# Patient Record
Sex: FEMALE | Race: WHITE | NOT HISPANIC OR LATINO
[De-identification: names, ages, dates, MRNs, and addresses within clinical notes are randomized per-mention and may not be internally consistent; named-entity substitution may affect disease eponyms.]

---

## 2017-01-26 ENCOUNTER — RX RENEWAL (OUTPATIENT)
Age: 82
End: 2017-01-26

## 2017-02-27 ENCOUNTER — RX RENEWAL (OUTPATIENT)
Age: 82
End: 2017-02-27

## 2017-02-28 ENCOUNTER — RX RENEWAL (OUTPATIENT)
Age: 82
End: 2017-02-28

## 2017-03-13 ENCOUNTER — RX RENEWAL (OUTPATIENT)
Age: 82
End: 2017-03-13

## 2017-03-20 ENCOUNTER — APPOINTMENT (OUTPATIENT)
Dept: GERIATRICS | Facility: CLINIC | Age: 82
End: 2017-03-20

## 2017-03-20 VITALS
TEMPERATURE: 97.6 F | OXYGEN SATURATION: 96 % | WEIGHT: 154.25 LBS | HEIGHT: 63 IN | RESPIRATION RATE: 16 BRPM | SYSTOLIC BLOOD PRESSURE: 110 MMHG | HEART RATE: 91 BPM | BODY MASS INDEX: 27.33 KG/M2 | DIASTOLIC BLOOD PRESSURE: 60 MMHG

## 2017-03-20 VITALS
WEIGHT: 154 LBS | TEMPERATURE: 97.6 F | SYSTOLIC BLOOD PRESSURE: 110 MMHG | BODY MASS INDEX: 27.29 KG/M2 | DIASTOLIC BLOOD PRESSURE: 60 MMHG | HEIGHT: 63 IN

## 2017-03-21 ENCOUNTER — RX RENEWAL (OUTPATIENT)
Age: 82
End: 2017-03-21

## 2017-03-21 LAB
ALBUMIN SERPL ELPH-MCNC: 4 G/DL
ALP BLD-CCNC: 68 U/L
ALT SERPL-CCNC: 6 U/L
ANION GAP SERPL CALC-SCNC: 19 MMOL/L
AST SERPL-CCNC: 17 U/L
BASOPHILS # BLD AUTO: 0.04 K/UL
BASOPHILS NFR BLD AUTO: 0.5 %
BILIRUB SERPL-MCNC: 0.5 MG/DL
BUN SERPL-MCNC: 18 MG/DL
CALCIUM SERPL-MCNC: 9.5 MG/DL
CHLORIDE SERPL-SCNC: 100 MMOL/L
CHOLEST SERPL-MCNC: 139 MG/DL
CHOLEST/HDLC SERPL: 3.1 RATIO
CO2 SERPL-SCNC: 24 MMOL/L
CREAT SERPL-MCNC: 0.87 MG/DL
EOSINOPHIL # BLD AUTO: 0.11 K/UL
EOSINOPHIL NFR BLD AUTO: 1.5 %
GLUCOSE SERPL-MCNC: 92 MG/DL
HBA1C MFR BLD HPLC: 5.6 %
HCT VFR BLD CALC: 42.4 %
HDLC SERPL-MCNC: 45 MG/DL
HGB BLD-MCNC: 14.1 G/DL
IMM GRANULOCYTES NFR BLD AUTO: 0.1 %
LDLC SERPL CALC-MCNC: 76 MG/DL
LYMPHOCYTES # BLD AUTO: 2.23 K/UL
LYMPHOCYTES NFR BLD AUTO: 30.4 %
MAN DIFF?: NORMAL
MCHC RBC-ENTMCNC: 30.6 PG
MCHC RBC-ENTMCNC: 33.3 GM/DL
MCV RBC AUTO: 92 FL
MONOCYTES # BLD AUTO: 0.51 K/UL
MONOCYTES NFR BLD AUTO: 7 %
NEUTROPHILS # BLD AUTO: 4.43 K/UL
NEUTROPHILS NFR BLD AUTO: 60.5 %
PLATELET # BLD AUTO: 268 K/UL
POTASSIUM SERPL-SCNC: 4 MMOL/L
PROT SERPL-MCNC: 6.9 G/DL
RBC # BLD: 4.61 M/UL
RBC # FLD: 14.5 %
SODIUM SERPL-SCNC: 143 MMOL/L
TRIGL SERPL-MCNC: 89 MG/DL
TSH SERPL-ACNC: 1.41 UIU/ML
WBC # FLD AUTO: 7.33 K/UL

## 2017-04-18 ENCOUNTER — RX RENEWAL (OUTPATIENT)
Age: 82
End: 2017-04-18

## 2017-05-08 ENCOUNTER — RX RENEWAL (OUTPATIENT)
Age: 82
End: 2017-05-08

## 2017-05-17 ENCOUNTER — RX RENEWAL (OUTPATIENT)
Age: 82
End: 2017-05-17

## 2017-05-30 ENCOUNTER — RX RENEWAL (OUTPATIENT)
Age: 82
End: 2017-05-30

## 2017-06-08 ENCOUNTER — RX RENEWAL (OUTPATIENT)
Age: 82
End: 2017-06-08

## 2017-06-13 ENCOUNTER — APPOINTMENT (OUTPATIENT)
Dept: GERIATRICS | Facility: CLINIC | Age: 82
End: 2017-06-13

## 2017-06-13 VITALS
OXYGEN SATURATION: 95 % | RESPIRATION RATE: 15 BRPM | HEIGHT: 63 IN | BODY MASS INDEX: 26.95 KG/M2 | TEMPERATURE: 97.5 F | HEART RATE: 85 BPM | DIASTOLIC BLOOD PRESSURE: 60 MMHG | SYSTOLIC BLOOD PRESSURE: 110 MMHG | WEIGHT: 152.13 LBS

## 2017-06-13 DIAGNOSIS — J06.9 ACUTE UPPER RESPIRATORY INFECTION, UNSPECIFIED: ICD-10-CM

## 2017-06-13 DIAGNOSIS — B97.89 ACUTE UPPER RESPIRATORY INFECTION, UNSPECIFIED: ICD-10-CM

## 2017-07-10 ENCOUNTER — RX RENEWAL (OUTPATIENT)
Age: 82
End: 2017-07-10

## 2017-07-14 ENCOUNTER — MEDICATION RENEWAL (OUTPATIENT)
Age: 82
End: 2017-07-14

## 2017-07-31 ENCOUNTER — RX RENEWAL (OUTPATIENT)
Age: 82
End: 2017-07-31

## 2017-08-10 ENCOUNTER — RX RENEWAL (OUTPATIENT)
Age: 82
End: 2017-08-10

## 2017-08-10 DIAGNOSIS — M25.511 PAIN IN RIGHT SHOULDER: ICD-10-CM

## 2017-08-15 ENCOUNTER — RX RENEWAL (OUTPATIENT)
Age: 82
End: 2017-08-15

## 2017-09-14 ENCOUNTER — RX RENEWAL (OUTPATIENT)
Age: 82
End: 2017-09-14

## 2017-09-18 ENCOUNTER — RX RENEWAL (OUTPATIENT)
Age: 82
End: 2017-09-18

## 2017-10-03 ENCOUNTER — APPOINTMENT (OUTPATIENT)
Dept: GERIATRICS | Facility: CLINIC | Age: 82
End: 2017-10-03

## 2017-10-04 ENCOUNTER — OTHER (OUTPATIENT)
Age: 82
End: 2017-10-04

## 2017-10-16 ENCOUNTER — MEDICATION RENEWAL (OUTPATIENT)
Age: 82
End: 2017-10-16

## 2017-10-18 ENCOUNTER — RX RENEWAL (OUTPATIENT)
Age: 82
End: 2017-10-18

## 2017-10-19 ENCOUNTER — APPOINTMENT (OUTPATIENT)
Dept: GERIATRICS | Facility: CLINIC | Age: 82
End: 2017-10-19

## 2017-11-13 ENCOUNTER — RX RENEWAL (OUTPATIENT)
Age: 82
End: 2017-11-13

## 2017-11-15 ENCOUNTER — RX RENEWAL (OUTPATIENT)
Age: 82
End: 2017-11-15

## 2017-12-03 ENCOUNTER — RX RENEWAL (OUTPATIENT)
Age: 82
End: 2017-12-03

## 2017-12-08 ENCOUNTER — APPOINTMENT (OUTPATIENT)
Dept: OTOLARYNGOLOGY | Facility: CLINIC | Age: 82
End: 2017-12-08
Payer: MEDICARE

## 2017-12-08 VITALS
HEART RATE: 69 BPM | DIASTOLIC BLOOD PRESSURE: 99 MMHG | SYSTOLIC BLOOD PRESSURE: 153 MMHG | HEIGHT: 63 IN | WEIGHT: 152 LBS | BODY MASS INDEX: 26.93 KG/M2

## 2017-12-08 DIAGNOSIS — J30.9 ALLERGIC RHINITIS, UNSPECIFIED: ICD-10-CM

## 2017-12-08 PROCEDURE — 99214 OFFICE O/P EST MOD 30 MIN: CPT | Mod: 25

## 2017-12-08 PROCEDURE — 69210 REMOVE IMPACTED EAR WAX UNI: CPT

## 2017-12-08 RX ORDER — QUETIAPINE 150 MG/1
150 TABLET, FILM COATED, EXTENDED RELEASE ORAL
Qty: 30 | Refills: 0 | Status: ACTIVE | COMMUNITY
Start: 2017-06-01

## 2017-12-14 ENCOUNTER — APPOINTMENT (OUTPATIENT)
Dept: GERIATRICS | Facility: CLINIC | Age: 82
End: 2017-12-14
Payer: MEDICARE

## 2017-12-14 ENCOUNTER — RX RENEWAL (OUTPATIENT)
Age: 82
End: 2017-12-14

## 2017-12-14 VITALS
HEIGHT: 63 IN | HEART RATE: 96 BPM | DIASTOLIC BLOOD PRESSURE: 90 MMHG | SYSTOLIC BLOOD PRESSURE: 170 MMHG | BODY MASS INDEX: 27.11 KG/M2 | TEMPERATURE: 98.3 F | WEIGHT: 153 LBS | OXYGEN SATURATION: 96 % | RESPIRATION RATE: 15 BRPM

## 2017-12-14 DIAGNOSIS — Z00.00 ENCOUNTER FOR GENERAL ADULT MEDICAL EXAMINATION W/OUT ABNORMAL FINDINGS: ICD-10-CM

## 2017-12-14 PROCEDURE — 99214 OFFICE O/P EST MOD 30 MIN: CPT | Mod: GC

## 2017-12-14 RX ORDER — CIPROFLOXACIN HYDROCHLORIDE 500 MG/1
500 TABLET, FILM COATED ORAL
Qty: 28 | Refills: 0 | Status: DISCONTINUED | COMMUNITY
Start: 2016-09-23 | End: 2017-12-14

## 2017-12-14 RX ORDER — METRONIDAZOLE 500 MG/1
500 TABLET ORAL EVERY 8 HOURS
Qty: 30 | Refills: 0 | Status: DISCONTINUED | COMMUNITY
Start: 2016-09-23 | End: 2017-12-14

## 2017-12-14 RX ORDER — CETIRIZINE HYDROCHLORIDE 10 MG/1
10 TABLET, FILM COATED ORAL
Qty: 1 | Refills: 0 | Status: DISCONTINUED | COMMUNITY
Start: 2017-06-13 | End: 2017-12-14

## 2017-12-14 RX ORDER — DULOXETINE HYDROCHLORIDE 60 MG/1
60 CAPSULE, DELAYED RELEASE PELLETS ORAL
Qty: 30 | Refills: 0 | Status: DISCONTINUED | COMMUNITY
Start: 2017-06-01 | End: 2017-12-14

## 2017-12-18 ENCOUNTER — APPOINTMENT (OUTPATIENT)
Dept: OTOLARYNGOLOGY | Facility: CLINIC | Age: 82
End: 2017-12-18

## 2018-01-09 ENCOUNTER — RX RENEWAL (OUTPATIENT)
Age: 83
End: 2018-01-09

## 2018-01-12 ENCOUNTER — MEDICATION RENEWAL (OUTPATIENT)
Age: 83
End: 2018-01-12

## 2018-01-30 ENCOUNTER — APPOINTMENT (OUTPATIENT)
Dept: UROGYNECOLOGY | Facility: CLINIC | Age: 83
End: 2018-01-30

## 2018-02-13 ENCOUNTER — MEDICATION RENEWAL (OUTPATIENT)
Age: 83
End: 2018-02-13

## 2018-03-14 ENCOUNTER — MEDICATION RENEWAL (OUTPATIENT)
Age: 83
End: 2018-03-14

## 2018-03-28 ENCOUNTER — RX RENEWAL (OUTPATIENT)
Age: 83
End: 2018-03-28

## 2018-03-29 ENCOUNTER — APPOINTMENT (OUTPATIENT)
Dept: GERIATRICS | Facility: CLINIC | Age: 83
End: 2018-03-29
Payer: MEDICARE

## 2018-03-29 VITALS
WEIGHT: 161.38 LBS | SYSTOLIC BLOOD PRESSURE: 110 MMHG | HEART RATE: 90 BPM | RESPIRATION RATE: 16 BRPM | DIASTOLIC BLOOD PRESSURE: 70 MMHG | OXYGEN SATURATION: 74 % | HEIGHT: 63 IN | BODY MASS INDEX: 28.59 KG/M2 | TEMPERATURE: 97.6 F

## 2018-03-29 DIAGNOSIS — S70.10XA CONTUSION OF UNSPECIFIED THIGH, INITIAL ENCOUNTER: ICD-10-CM

## 2018-03-29 LAB
ALBUMIN SERPL ELPH-MCNC: 3.9 G/DL
ALP BLD-CCNC: 69 U/L
ALT SERPL-CCNC: 9 U/L
ANION GAP SERPL CALC-SCNC: 14 MMOL/L
AST SERPL-CCNC: 16 U/L
BASOPHILS # BLD AUTO: 0.03 K/UL
BASOPHILS NFR BLD AUTO: 0.4 %
BILIRUB SERPL-MCNC: 0.3 MG/DL
BUN SERPL-MCNC: 14 MG/DL
CALCIUM SERPL-MCNC: 9.5 MG/DL
CHLORIDE SERPL-SCNC: 105 MMOL/L
CHOLEST SERPL-MCNC: 148 MG/DL
CHOLEST/HDLC SERPL: 2.7 RATIO
CO2 SERPL-SCNC: 25 MMOL/L
CREAT SERPL-MCNC: 0.95 MG/DL
EOSINOPHIL # BLD AUTO: 0.11 K/UL
EOSINOPHIL NFR BLD AUTO: 1.6 %
GLUCOSE SERPL-MCNC: 72 MG/DL
HCT VFR BLD CALC: 36.1 %
HDLC SERPL-MCNC: 54 MG/DL
HGB BLD-MCNC: 11.6 G/DL
IMM GRANULOCYTES NFR BLD AUTO: 0 %
LDLC SERPL CALC-MCNC: 74 MG/DL
LYMPHOCYTES # BLD AUTO: 2.16 K/UL
LYMPHOCYTES NFR BLD AUTO: 32 %
MAN DIFF?: NORMAL
MCHC RBC-ENTMCNC: 29.7 PG
MCHC RBC-ENTMCNC: 32.1 GM/DL
MCV RBC AUTO: 92.6 FL
MONOCYTES # BLD AUTO: 0.59 K/UL
MONOCYTES NFR BLD AUTO: 8.7 %
NEUTROPHILS # BLD AUTO: 3.87 K/UL
NEUTROPHILS NFR BLD AUTO: 57.3 %
PLATELET # BLD AUTO: 271 K/UL
POTASSIUM SERPL-SCNC: 4.6 MMOL/L
PROT SERPL-MCNC: 6.7 G/DL
RBC # BLD: 3.9 M/UL
RBC # FLD: 15.3 %
SODIUM SERPL-SCNC: 144 MMOL/L
TRIGL SERPL-MCNC: 101 MG/DL
WBC # FLD AUTO: 6.76 K/UL

## 2018-03-29 PROCEDURE — 99214 OFFICE O/P EST MOD 30 MIN: CPT

## 2018-04-06 ENCOUNTER — RX RENEWAL (OUTPATIENT)
Age: 83
End: 2018-04-06

## 2018-04-09 ENCOUNTER — OUTPATIENT (OUTPATIENT)
Dept: OUTPATIENT SERVICES | Facility: HOSPITAL | Age: 83
LOS: 1 days | End: 2018-04-09
Payer: MEDICARE

## 2018-04-09 ENCOUNTER — APPOINTMENT (OUTPATIENT)
Dept: RADIOLOGY | Facility: CLINIC | Age: 83
End: 2018-04-09
Payer: MEDICARE

## 2018-04-09 DIAGNOSIS — Z00.8 ENCOUNTER FOR OTHER GENERAL EXAMINATION: ICD-10-CM

## 2018-04-09 PROCEDURE — 73552 X-RAY EXAM OF FEMUR 2/>: CPT

## 2018-04-09 PROCEDURE — 73552 X-RAY EXAM OF FEMUR 2/>: CPT | Mod: 26,RT

## 2018-04-24 ENCOUNTER — RX RENEWAL (OUTPATIENT)
Age: 83
End: 2018-04-24

## 2018-05-31 ENCOUNTER — RX RENEWAL (OUTPATIENT)
Age: 83
End: 2018-05-31

## 2018-06-08 ENCOUNTER — RX RENEWAL (OUTPATIENT)
Age: 83
End: 2018-06-08

## 2018-07-12 ENCOUNTER — APPOINTMENT (OUTPATIENT)
Dept: GERIATRICS | Facility: CLINIC | Age: 83
End: 2018-07-12
Payer: MEDICARE

## 2018-07-12 VITALS
DIASTOLIC BLOOD PRESSURE: 70 MMHG | OXYGEN SATURATION: 99 % | TEMPERATURE: 97.3 F | SYSTOLIC BLOOD PRESSURE: 120 MMHG | RESPIRATION RATE: 16 BRPM | HEART RATE: 78 BPM | BODY MASS INDEX: 26.75 KG/M2 | HEIGHT: 63 IN | WEIGHT: 151 LBS

## 2018-07-12 DIAGNOSIS — Z23 ENCOUNTER FOR IMMUNIZATION: ICD-10-CM

## 2018-07-12 PROCEDURE — 99214 OFFICE O/P EST MOD 30 MIN: CPT

## 2018-07-12 RX ORDER — FLUOXETINE HYDROCHLORIDE 20 MG/1
20 CAPSULE ORAL
Qty: 30 | Refills: 0 | Status: DISCONTINUED | COMMUNITY
Start: 2018-05-11

## 2018-07-12 RX ORDER — FLUOXETINE HYDROCHLORIDE 10 MG/1
10 CAPSULE ORAL
Qty: 30 | Refills: 0 | Status: DISCONTINUED | COMMUNITY
Start: 2018-04-27

## 2018-07-12 RX ORDER — CLONAZEPAM 0.5 MG/1
0.5 TABLET ORAL
Qty: 60 | Refills: 0 | Status: DISCONTINUED | COMMUNITY
Start: 2018-02-23

## 2018-08-01 ENCOUNTER — RX RENEWAL (OUTPATIENT)
Age: 83
End: 2018-08-01

## 2018-08-13 ENCOUNTER — APPOINTMENT (OUTPATIENT)
Dept: OTOLARYNGOLOGY | Facility: CLINIC | Age: 83
End: 2018-08-13

## 2018-08-14 ENCOUNTER — APPOINTMENT (OUTPATIENT)
Dept: OTOLARYNGOLOGY | Facility: CLINIC | Age: 83
End: 2018-08-14
Payer: MEDICARE

## 2018-08-14 VITALS
HEART RATE: 88 BPM | SYSTOLIC BLOOD PRESSURE: 111 MMHG | DIASTOLIC BLOOD PRESSURE: 81 MMHG | WEIGHT: 151 LBS | BODY MASS INDEX: 26.75 KG/M2 | HEIGHT: 63 IN

## 2018-08-14 PROCEDURE — 99214 OFFICE O/P EST MOD 30 MIN: CPT | Mod: 25

## 2018-08-14 PROCEDURE — 69210 REMOVE IMPACTED EAR WAX UNI: CPT

## 2018-09-19 ENCOUNTER — RX RENEWAL (OUTPATIENT)
Age: 83
End: 2018-09-19

## 2018-09-27 ENCOUNTER — RX RENEWAL (OUTPATIENT)
Age: 83
End: 2018-09-27

## 2018-10-02 ENCOUNTER — RX RENEWAL (OUTPATIENT)
Age: 83
End: 2018-10-02

## 2018-10-11 ENCOUNTER — APPOINTMENT (OUTPATIENT)
Dept: GERIATRICS | Facility: CLINIC | Age: 83
End: 2018-10-11
Payer: MEDICARE

## 2018-10-11 VITALS
OXYGEN SATURATION: 98 % | DIASTOLIC BLOOD PRESSURE: 80 MMHG | TEMPERATURE: 97.5 F | SYSTOLIC BLOOD PRESSURE: 140 MMHG | HEART RATE: 94 BPM | BODY MASS INDEX: 26.08 KG/M2 | WEIGHT: 147.2 LBS

## 2018-10-11 PROCEDURE — 90662 IIV NO PRSV INCREASED AG IM: CPT

## 2018-10-11 PROCEDURE — G0008: CPT

## 2018-10-11 PROCEDURE — 99214 OFFICE O/P EST MOD 30 MIN: CPT | Mod: 25

## 2018-10-11 RX ORDER — QUETIAPINE FUMARATE 25 MG/1
25 TABLET ORAL DAILY
Refills: 0 | Status: ACTIVE | COMMUNITY
Start: 2018-10-11

## 2018-11-07 ENCOUNTER — APPOINTMENT (OUTPATIENT)
Dept: OTOLARYNGOLOGY | Facility: CLINIC | Age: 83
End: 2018-11-07

## 2018-11-07 ENCOUNTER — RX RENEWAL (OUTPATIENT)
Age: 83
End: 2018-11-07

## 2018-11-26 ENCOUNTER — RX RENEWAL (OUTPATIENT)
Age: 83
End: 2018-11-26

## 2018-12-13 ENCOUNTER — RX RENEWAL (OUTPATIENT)
Age: 83
End: 2018-12-13

## 2019-01-17 ENCOUNTER — APPOINTMENT (OUTPATIENT)
Dept: UROGYNECOLOGY | Facility: CLINIC | Age: 84
End: 2019-01-17
Payer: MEDICARE

## 2019-01-17 DIAGNOSIS — Z86.59 PERSONAL HISTORY OF OTHER MENTAL AND BEHAVIORAL DISORDERS: ICD-10-CM

## 2019-01-17 DIAGNOSIS — Z82.49 FAMILY HISTORY OF ISCHEMIC HEART DISEASE AND OTHER DISEASES OF THE CIRCULATORY SYSTEM: ICD-10-CM

## 2019-01-17 DIAGNOSIS — N30.00 ACUTE CYSTITIS W/OUT HEMATURIA: ICD-10-CM

## 2019-01-17 LAB
BILIRUB UR QL STRIP: NORMAL
CLARITY UR: NORMAL
COLLECTION METHOD: NORMAL
GLUCOSE UR-MCNC: NORMAL
HCG UR QL: 0.2 EU/DL
HGB UR QL STRIP.AUTO: NORMAL
KETONES UR-MCNC: NORMAL
LEUKOCYTE ESTERASE UR QL STRIP: NORMAL
NITRITE UR QL STRIP: NORMAL
PH UR STRIP: 5.5
PROT UR STRIP-MCNC: NORMAL
SP GR UR STRIP: 1.02

## 2019-01-17 PROCEDURE — 99204 OFFICE O/P NEW MOD 45 MIN: CPT | Mod: 25

## 2019-01-17 PROCEDURE — 51701 INSERT BLADDER CATHETER: CPT

## 2019-01-17 PROCEDURE — 81003 URINALYSIS AUTO W/O SCOPE: CPT | Mod: QW

## 2019-01-17 NOTE — PROCEDURE
[Straight Catheterization] : insertion of a straight catheter [Urinary Tract Infection] : a urinary tract infection [Urinary Frequency] : urinary frequency

## 2019-01-17 NOTE — ASSESSMENT
[FreeTextEntry1] : this is an 83-year-old woman with your severe urinary incontinence and severe atrophy of the [employer]. There is i I have recommended lotrisone, estrogen cream and zinc oxide. The catheter urine is  positive for cystitis I've given her Bactrim twice a day for three days she will using on her own at home and return for self administration teaching  and for her caretaker to also learn on how to apply the lectures on an etching cream . we will work up the incontinence after the above interventions

## 2019-01-17 NOTE — HISTORY OF PRESENT ILLNESS
[Cystocele (Obstetric)] : none [Uterine Prolapse] : none [Vaginal Wall Prolapse] : none [Rectal Prolapse] : none [Stress Incontinence] : none [Urge Incontinence Of Urine] : none [Unable To Restrain Bowel Movement] : frequent [x1] : once nightly [Urinary Stream Starts And Stops] : none [Incomplete Emptying Of Bladder] : none [Urinary Frequency] : none [Feelings Of Urinary Urgency] : none [Pain During Urination (Dysuria)] : none [Urinary Tract Infection] : frequent [Hematuria] : none [Constipation Obstructed Defecation] : none [Incomplete Emptying Of Stool] : none [Stool Visible Blood] : none [Diarrhea] : none [Pelvic Pain] : none [Vaginal Pain] : none [Vulvar Pain] : none [Bladder Pain] : none [Rectal Pain] : none [Back Pain] : frequent [Sexual Dysfunction, NOS] : none [] : none

## 2019-01-17 NOTE — PHYSICAL EXAM
[No Acute Distress] : in no acute distress [Well developed] : well developed [Well Nourished] : ~L well nourished [Good Hygeine] : demonstrates good hygeine [Oriented x3] : oriented to person, place, and time [Normal Memory] : ~T memory was ~L unimpaired [Normal Mood/Affect] : mood and affect are normal [Normal Appearance] : ~T the appearance of the neck was normal [Supple] : ~T the neck demonstrated no ~M decrease in suppleness [Symmetrical] : the neck was ~L symmetrical [Mass] : no breast mass [Tender] : no tenderness [Nipple Discharge] : no nipple discharge [Axillary LAD] : no axillary lymphadenopathy [Mass (___ Cm)] : no ~M [unfilled] abdominal mass was palpated [Tenderness] : ~T no ~M abdominal tenderness observed [H/Smegaly] : no hepatosplenomegaly [Warm and Dry] : was warm and dry to touch [Turgor Normal] : skin turgor ~T was normal [Rash/Lesion] : no rash or lesion was noted [No Joint Swelling] : there was no joint swelling [No Clubbing, Cyanosis] : no clubbing or cyanosis of the fingernails [Vulvitis] : vulvitis [Vulvar Atrophy] : vulvar atrophy [Vulvar Stricture] : a vulvar stricture [Vulvar Scarring] : vulvar scarring [Labia Majora Erythema] : erythema [Labia Majora Ecchymosis] : ecchymosis [Labia Majora Swelling] : swelling [Atrophy] : atrophy [Dry Mucosa] : dry mucosa [Aa ____] : Aa [unfilled] [Ba ____] : Ba [unfilled] [C ____] : C [unfilled] [TVL ____] : TVL  [unfilled] [Ap ____] : Ap [unfilled] [Bp ____] : Bp [unfilled] [D ____] : D [unfilled] [] : 0 [Uterine Adnexae] : were not tender and not enlarged [Exam Deferred] : was deferred [Normal] : was normal [None] : no

## 2019-01-18 ENCOUNTER — RESULT REVIEW (OUTPATIENT)
Age: 84
End: 2019-01-18

## 2019-01-22 ENCOUNTER — APPOINTMENT (OUTPATIENT)
Dept: UROGYNECOLOGY | Facility: CLINIC | Age: 84
End: 2019-01-22
Payer: MEDICARE

## 2019-01-22 ENCOUNTER — RESULT REVIEW (OUTPATIENT)
Age: 84
End: 2019-01-22

## 2019-01-22 DIAGNOSIS — N39.46 MIXED INCONTINENCE: ICD-10-CM

## 2019-01-22 DIAGNOSIS — N39.0 URINARY TRACT INFECTION, SITE NOT SPECIFIED: ICD-10-CM

## 2019-01-22 PROCEDURE — 99214 OFFICE O/P EST MOD 30 MIN: CPT

## 2019-01-23 NOTE — PHYSICAL EXAM
[No Acute Distress] : in no acute distress [Well developed] : well developed [Well Nourished] : ~L well nourished [Good Hygeine] : demonstrates good hygeine [Anxiety] : patient is anxious [Warm and Dry] : was warm and dry to touch [Normal Gait] : gait was normal [Labia Majora Erythema] : erythema [Labia Minora Erythema] : erythema [Atrophy] : atrophy [No Bleeding] : there was no active vaginal bleeding [Oriented x3] : disoriented to person, place, or time [Normal Memory] : ~T memory was ~L impaired [Tenderness] : ~T no ~M abdominal tenderness observed [Distended] : not distended [No Invol. Movements] : there was involuntary movement seen [de-identified] : marked vulvar atrophy [FreeTextEntry4] : very narrow introitus/vaginal vault allowing insertion of only 1 digit with manual exam; Estrace cream applicator with topical lubricant inserted to posterior vault with slight discomfort

## 2019-01-23 NOTE — DISCUSSION/SUMMARY
[FreeTextEntry1] : Severe Atrophic Vaginitis - Instructed, demonstrated and observed pt's HHA, Mary, with insertion of Estrace cream 2g with applicator per vagina and topical application of Lotrisone cream around introitus, Vulvar region without difficulties.\par Reiterated instructions - Estrace cream 2g PV QOD HS and topical Lotrisone cream QHS and frequent daily applications of Zinc oxide ointment as barrier protection during the day with adult diaper use.\par Advised pt to start Macrobid Rx today due to e. coli Resistance to Bactrim on UCS results.\par 2/26/19 f/u appt with Dr. Acevedo or [facility]\par Instructed pt and pt's HHA to call the office if any problems or concerns and she verbalized understanding.\par \par

## 2019-02-07 ENCOUNTER — APPOINTMENT (OUTPATIENT)
Dept: GERIATRICS | Facility: CLINIC | Age: 84
End: 2019-02-07
Payer: MEDICARE

## 2019-02-07 VITALS
BODY MASS INDEX: 1.71 KG/M2 | HEART RATE: 75 BPM | WEIGHT: 9.63 LBS | SYSTOLIC BLOOD PRESSURE: 120 MMHG | RESPIRATION RATE: 16 BRPM | DIASTOLIC BLOOD PRESSURE: 70 MMHG | TEMPERATURE: 98.1 F | OXYGEN SATURATION: 99 % | HEIGHT: 63 IN

## 2019-02-07 PROCEDURE — 99214 OFFICE O/P EST MOD 30 MIN: CPT | Mod: GC

## 2019-02-07 RX ORDER — NITROFURANTOIN (MONOHYDRATE/MACROCRYSTALS) 25; 75 MG/1; MG/1
100 CAPSULE ORAL
Qty: 10 | Refills: 0 | Status: DISCONTINUED | COMMUNITY
Start: 2019-01-22 | End: 2019-02-07

## 2019-02-07 RX ORDER — FLUTICASONE PROPIONATE 50 UG/1
50 SPRAY, METERED NASAL DAILY
Qty: 16 | Refills: 1 | Status: DISCONTINUED | COMMUNITY
Start: 2017-06-13 | End: 2019-02-07

## 2019-02-07 RX ORDER — SULFAMETHOXAZOLE AND TRIMETHOPRIM 800; 160 MG/1; MG/1
800-160 TABLET ORAL
Qty: 6 | Refills: 0 | Status: DISCONTINUED | COMMUNITY
Start: 2019-01-17 | End: 2019-02-07

## 2019-02-07 RX ORDER — TRAMADOL HYDROCHLORIDE 50 MG/1
50 TABLET, COATED ORAL
Refills: 0 | Status: DISCONTINUED | COMMUNITY
Start: 2017-12-14 | End: 2019-02-07

## 2019-02-07 RX ORDER — SULFAMETHOXAZOLE AND TRIMETHOPRIM 800; 160 MG/1; MG/1
800-160 TABLET ORAL TWICE DAILY
Qty: 6 | Refills: 0 | Status: DISCONTINUED | COMMUNITY
Start: 2019-01-17 | End: 2019-02-07

## 2019-02-07 NOTE — HISTORY OF PRESENT ILLNESS
[FreeTextEntry1] : Mrs. Brisa Garner is and 82 yo F patient that comes today for follow up.  She accompanied by her aide Kiersten who assists with history.  \par \par Patient has been visiting  Uro[facility]yn due to atrophic vaginitis,  she says that she feels a lot better and the burning and pain has improve.\par She is also complaining of  being very anxious, she usually sees Dr. Lilly ([facility]) and she is on Fluoxetine 40 mgs. [Moderate] : Stage: Moderate [Stable] : Status: Stable [None] : ~He/She~ has no significant interval events

## 2019-02-07 NOTE — PHYSICAL EXAM
[General Appearance - Alert] : alert [General Appearance - In No Acute Distress] : in no acute distress [Neck Appearance] : the appearance of the neck was normal [Respiration, Rhythm And Depth] : normal respiratory rhythm and effort [Exaggerated Use Of Accessory Muscles For Inspiration] : no accessory muscle use [Auscultation Breath Sounds / Voice Sounds] : lungs were clear to auscultation bilaterally [Apical Impulse] : the apical impulse was normal [Heart Rate And Rhythm] : heart rate was normal and rhythm regular [Heart Sounds] : normal S1 and S2 [Murmurs] : no murmurs [No Focal Deficits] : no focal deficits [Sclera] : the sclera and conjunctiva were normal [PERRL With Normal Accommodation] : pupils were equal in size, round, and reactive to light [Extraocular Movements] : extraocular movements were intact [Outer Ear] : the ears and nose were normal in appearance [Edema] : there was no peripheral edema [Bowel Sounds] : normal bowel sounds [Abdomen Soft] : soft [Abdomen Tenderness] : non-tender [Cervical Lymph Nodes Enlarged Anterior Bilaterally] : anterior cervical [Supraclavicular Lymph Nodes Enlarged Bilaterally] : supraclavicular [Axillary Lymph Nodes Enlarged Bilaterally] : axillary [No CVA Tenderness] : no ~M costovertebral angle tenderness [No Spinal Tenderness] : no spinal tenderness [Involuntary Movements] : no involuntary movements were seen [Musculoskeletal - Swelling] : no joint swelling seen [] : no rash [FreeTextEntry1] : Tremors

## 2019-02-07 NOTE — ADDENDUM
[FreeTextEntry1] : Mrs. Garner was seen with Dr. Abrams, geriatric fellow. Detailed history, exam and plan as per fellow's note. Overall Mrs. Garner is doing quite well. Her condition appears stable. I advised her to continue her current medications and she will follow up with me again in 4 months.

## 2019-02-07 NOTE — REASON FOR VISIT
[Follow-Up] : a follow-up visit [Pre-Visit Preparation] : pre-visit preparation was not done [Intercurrent Specialty/Sub-specialty Visits] : the patient has no intercurrent specialty/sub-specialty visits [FreeTextEntry1] : Followup hypertension

## 2019-02-08 LAB
ALBUMIN SERPL ELPH-MCNC: 4.2 G/DL
ALP BLD-CCNC: 66 U/L
ALT SERPL-CCNC: 9 U/L
ANION GAP SERPL CALC-SCNC: 13 MMOL/L
AST SERPL-CCNC: 17 U/L
BILIRUB SERPL-MCNC: 0.2 MG/DL
BUN SERPL-MCNC: 13 MG/DL
CALCIUM SERPL-MCNC: 9.3 MG/DL
CHLORIDE SERPL-SCNC: 107 MMOL/L
CHOLEST SERPL-MCNC: 137 MG/DL
CHOLEST/HDLC SERPL: 2.2 RATIO
CO2 SERPL-SCNC: 25 MMOL/L
CREAT SERPL-MCNC: 0.85 MG/DL
GLUCOSE SERPL-MCNC: 84 MG/DL
HDLC SERPL-MCNC: 63 MG/DL
LDLC SERPL CALC-MCNC: 56 MG/DL
POTASSIUM SERPL-SCNC: 4.1 MMOL/L
PROT SERPL-MCNC: 6.8 G/DL
SODIUM SERPL-SCNC: 145 MMOL/L
TRIGL SERPL-MCNC: 91 MG/DL

## 2019-03-05 ENCOUNTER — RX RENEWAL (OUTPATIENT)
Age: 84
End: 2019-03-05

## 2019-03-07 ENCOUNTER — APPOINTMENT (OUTPATIENT)
Dept: UROGYNECOLOGY | Facility: CLINIC | Age: 84
End: 2019-03-07
Payer: MEDICARE

## 2019-03-07 DIAGNOSIS — M54.9 DORSALGIA, UNSPECIFIED: ICD-10-CM

## 2019-03-07 DIAGNOSIS — G89.29 DORSALGIA, UNSPECIFIED: ICD-10-CM

## 2019-03-07 DIAGNOSIS — Z87.42 PERSONAL HISTORY OF OTHER DISEASES OF THE FEMALE GENITAL TRACT: ICD-10-CM

## 2019-03-07 LAB
BILIRUB UR QL STRIP: NORMAL
CLARITY UR: CLEAR
COLLECTION METHOD: NORMAL
GLUCOSE UR-MCNC: NORMAL
HCG UR QL: 0.2 EU/DL
HGB UR QL STRIP.AUTO: NORMAL
KETONES UR-MCNC: NORMAL
LEUKOCYTE ESTERASE UR QL STRIP: NORMAL
NITRITE UR QL STRIP: NORMAL
PH UR STRIP: 5.5
PROT UR STRIP-MCNC: NORMAL
SP GR UR STRIP: 1.02

## 2019-03-07 PROCEDURE — 51701 INSERT BLADDER CATHETER: CPT

## 2019-03-07 PROCEDURE — 99214 OFFICE O/P EST MOD 30 MIN: CPT | Mod: 25

## 2019-03-07 PROCEDURE — 81003 URINALYSIS AUTO W/O SCOPE: CPT | Mod: QW

## 2019-03-07 NOTE — DISCUSSION/SUMMARY
[FreeTextEntry1] : Severe Atrophic Vaginitis - Instructed, demonstrated and observed pt's HHA, Mary, with insertion of Estrace cream 2g with applicator per vagina and topical application of Lotrisone cream around introitus, Vulvar region without difficulties.\par Reiterated instructions - Estrace cream 2g PV QOD HS and topical Lotrisone cream QHS and frequent daily applications of Zinc oxide ointment as barrier protection during the day with adult diaper use.\par \par \par Recommend expanding treatment to perirectal region and urethra.  No UTI today - likely atrophy symptoms. \par \par Return 3-5 months\par

## 2019-03-07 NOTE — HISTORY OF PRESENT ILLNESS
[FreeTextEntry1] : Pt with severe vaginal atrophy and urinary incontinence s/p Bactrim DS for UTI presents today accompanied by HHA, Mary, for Estrace and Lotrisone cream teachings.  Pt has completed  Macrobid prescribed  for January positive culture.  Pt's aide reports pt continues to have urinary incontinence and wearing adult diapers.  Denies any abd/pelvic pain, hematuria, F/C/N/V/D/C.  Pt c/o pain in lower back/rectum, but has not scheduled appt with PCP or GI for eval as recommended by Dr. Acevedo.  Pt has been applying topical Zinc oxide/Desitin ointment daily and brings new Rx creams to start today.\par \par The anterior vaginal symptoms are improvedperirectal symptoms remain more of a problem.\par \par Feels has UTI again

## 2019-05-25 ENCOUNTER — RX RENEWAL (OUTPATIENT)
Age: 84
End: 2019-05-25

## 2019-06-03 ENCOUNTER — RX RENEWAL (OUTPATIENT)
Age: 84
End: 2019-06-03

## 2019-06-07 ENCOUNTER — APPOINTMENT (OUTPATIENT)
Dept: UROGYNECOLOGY | Facility: CLINIC | Age: 84
End: 2019-06-07

## 2019-06-13 ENCOUNTER — APPOINTMENT (OUTPATIENT)
Dept: UROGYNECOLOGY | Facility: CLINIC | Age: 84
End: 2019-06-13
Payer: MEDICARE

## 2019-06-13 DIAGNOSIS — R39.13 SPLITTING OF URINARY STREAM: ICD-10-CM

## 2019-06-13 DIAGNOSIS — N95.2 POSTMENOPAUSAL ATROPHIC VAGINITIS: ICD-10-CM

## 2019-06-13 PROCEDURE — 99214 OFFICE O/P EST MOD 30 MIN: CPT

## 2019-06-13 NOTE — HISTORY OF PRESENT ILLNESS
[FreeTextEntry1] : 84-year-old with severe vulvovaginal atrophy did very well in January with her caretaker using the regimen including Lotrisone estrogen and zinc oxide. After one month she was doing well and stopped using the creams and there is subsequent recurrence of symptoms.  she has some dysuria but on examination , I cannot approach the THE URETHRA DUE TO THE SEVERE VULVAR INFLAMMATION. THERE Are NO  SPECIFIC LESIONS - JUST SEVERE ATROPHY\par \par This was a fairly similar exam to that which I found of my initial visit I think if they are compliant to the regimen and continue wit children of this severe of recurrence. Every minute Lotrisone estrogen cream they know how to get over the counter zinc oxide.\par \par I reviewed risks and adverse reactions and instructions for use of all creams

## 2019-08-02 ENCOUNTER — APPOINTMENT (OUTPATIENT)
Dept: UROGYNECOLOGY | Facility: CLINIC | Age: 84
End: 2019-08-02

## 2019-08-09 ENCOUNTER — APPOINTMENT (OUTPATIENT)
Dept: GERIATRICS | Facility: CLINIC | Age: 84
End: 2019-08-09
Payer: MEDICARE

## 2019-08-09 VITALS
WEIGHT: 149.8 LBS | OXYGEN SATURATION: 95 % | HEART RATE: 85 BPM | BODY MASS INDEX: 20.29 KG/M2 | TEMPERATURE: 98.1 F | RESPIRATION RATE: 17 BRPM | HEIGHT: 72 IN | SYSTOLIC BLOOD PRESSURE: 100 MMHG | DIASTOLIC BLOOD PRESSURE: 70 MMHG

## 2019-08-09 PROCEDURE — 69210 REMOVE IMPACTED EAR WAX UNI: CPT

## 2019-08-09 PROCEDURE — 99214 OFFICE O/P EST MOD 30 MIN: CPT | Mod: 25

## 2019-08-09 NOTE — REVIEW OF SYSTEMS
[Fever] : no fever [Chills] : no chills [Feeling Tired] : not feeling tired [Feeling Poorly] : not feeling poorly [Loss Of Hearing] : hearing loss [Nosebleeds] : no nosebleeds [Sore Throat] : no sore throat [Nasal Discharge] : nasal discharge [Hoarseness] : no hoarseness [Heart Rate Is Slow] : the heart rate was not slow [Chest Pain] : no chest pain [Heart Rate Is Fast] : the heart rate was not fast [Palpitations] : no palpitations [Lower Ext Edema] : no lower extremity edema [Wheezing] : no wheezing [Cough] : no cough [SOB on Exertion] : shortness of breath during exertion [Orthopnea] : no orthopnea [PND] : no PND [Constipation] : constipation [Arthralgias] : arthralgias [Limb Swelling] : no limb swelling [Difficulty Walking] : difficulty walking [Suicidal] : not suicidal [Sleep Disturbances] : no sleep disturbances [Depression] : depression [Anxiety] : anxiety [Negative] : Heme/Lymph

## 2019-08-09 NOTE — PHYSICAL EXAM
[General Appearance - Alert] : alert [General Appearance - In No Acute Distress] : in no acute distress [General Appearance - Well Nourished] : well nourished [Sclera] : the sclera and conjunctiva were normal [General Appearance - Well-Appearing] : healthy appearing [PERRL With Normal Accommodation] : pupils were equal in size, round, and reactive to light [Normal Oral Mucosa] : normal oral mucosa [Extraocular Movements] : extraocular movements were intact [No Oral Cyanosis] : no oral cyanosis [No Oral Pallor] : no oral pallor [Outer Ear] : the ears and nose were normal in appearance [Examination Of The Oral Cavity] : the lips and gums were normal [Both Tympanic Membranes Were Examined] : both tympanic membranes were normal [Nasal Cavity] : the nasal mucosa and septum were normal [Jugular Venous Distention Increased] : there was no jugular-venous distention [Neck Cervical Mass (___cm)] : no neck mass was observed [Auscultation Breath Sounds / Voice Sounds] : lungs were clear to auscultation bilaterally [Chest Palpation] : palpation of the chest revealed no abnormalities [FreeTextEntry1] : Ambulated patient over 100 feet. O2 sat 98 with a heart rate 92. Patient did not appear short of breath [Heart Sounds] : normal S1 and S2 [Edema] : there was no peripheral edema [Bowel Sounds] : normal bowel sounds [Abdomen Soft] : soft [] : no hepato-splenomegaly [Abdomen Tenderness] : non-tender [Cervical Lymph Nodes Enlarged Anterior Bilaterally] : anterior cervical [Cervical Lymph Nodes Enlarged Posterior Bilaterally] : posterior cervical [Axillary Lymph Nodes Enlarged Bilaterally] : axillary [Supraclavicular Lymph Nodes Enlarged Bilaterally] : supraclavicular [No CVA Tenderness] : no ~M costovertebral angle tenderness [No Spinal Tenderness] : no spinal tenderness [Involuntary Movements] : no involuntary movements were seen [No Focal Deficits] : no focal deficits

## 2019-08-09 NOTE — HISTORY OF PRESENT ILLNESS
[Moderate] : Stage: Moderate [FreeTextEntry1] : Mrs. Garner presents for followup accompanied by her aide. The patient is extremely anxious. She last saw her psychiatrist Dr. Martinez earlier this week and her medications were renewed. Patient has multiple complaints, including runny nose, tremor, anxiety, shortness of breath when walking, unstable gait. She ambulates with a walker and. She has not experienced any falls. She denies chest pain, palpitations, diaphoresis. She experiences on and off low back pain. [Stable] : Status: Stable

## 2019-08-09 NOTE — REASON FOR VISIT
[Follow-Up] : a follow-up visit [FreeTextEntry1] : Followup hypertension, depression, memory impairment

## 2019-11-21 LAB
APPEARANCE: CLEAR
BACTERIA UR CULT: ABNORMAL
BACTERIA: NEGATIVE
BILIRUBIN URINE: NEGATIVE
BLOOD URINE: NEGATIVE
COLOR: YELLOW
GLUCOSE QUALITATIVE U: NEGATIVE
HYALINE CASTS: 0 /LPF
KETONES URINE: NEGATIVE
LEUKOCYTE ESTERASE URINE: ABNORMAL
MICROSCOPIC-UA: NORMAL
NITRITE URINE: NEGATIVE
PH URINE: 6
PROTEIN URINE: ABNORMAL
RED BLOOD CELLS URINE: 0 /HPF
SPECIFIC GRAVITY URINE: 1.02
SQUAMOUS EPITHELIAL CELLS: 3 /HPF
UROBILINOGEN URINE: NEGATIVE
WHITE BLOOD CELLS URINE: 13 /HPF

## 2019-12-10 ENCOUNTER — RX RENEWAL (OUTPATIENT)
Age: 84
End: 2019-12-10

## 2020-01-13 ENCOUNTER — FORM ENCOUNTER (OUTPATIENT)
Age: 85
End: 2020-01-13

## 2020-01-14 ENCOUNTER — APPOINTMENT (OUTPATIENT)
Dept: RADIOLOGY | Facility: IMAGING CENTER | Age: 85
End: 2020-01-14
Payer: MEDICARE

## 2020-01-14 ENCOUNTER — OUTPATIENT (OUTPATIENT)
Dept: OUTPATIENT SERVICES | Facility: HOSPITAL | Age: 85
LOS: 1 days | End: 2020-01-14
Payer: MEDICARE

## 2020-01-14 ENCOUNTER — APPOINTMENT (OUTPATIENT)
Dept: GERIATRICS | Facility: CLINIC | Age: 85
End: 2020-01-14
Payer: MEDICARE

## 2020-01-14 VITALS
DIASTOLIC BLOOD PRESSURE: 60 MMHG | BODY MASS INDEX: 28.47 KG/M2 | RESPIRATION RATE: 14 BRPM | TEMPERATURE: 97.7 F | WEIGHT: 145 LBS | HEIGHT: 60 IN | SYSTOLIC BLOOD PRESSURE: 118 MMHG | OXYGEN SATURATION: 95 % | HEART RATE: 65 BPM

## 2020-01-14 DIAGNOSIS — M79.18 MYALGIA, OTHER SITE: ICD-10-CM

## 2020-01-14 DIAGNOSIS — D64.9 ANEMIA, UNSPECIFIED: ICD-10-CM

## 2020-01-14 PROCEDURE — 99214 OFFICE O/P EST MOD 30 MIN: CPT

## 2020-01-14 PROCEDURE — 73502 X-RAY EXAM HIP UNI 2-3 VIEWS: CPT

## 2020-01-14 PROCEDURE — 73502 X-RAY EXAM HIP UNI 2-3 VIEWS: CPT | Mod: 26,RT

## 2020-01-14 NOTE — HISTORY OF PRESENT ILLNESS
[FreeTextEntry1] : Mrs. Brisa Garner is an 84-year-old woman with a history of dementia and depression presenting for followup. She is accompanied by her home health aide who provides history. The patient's aide states that in November Mrs. Garner was experiencing cold intolerance and fatigue. She was found to have iron deficiency anemia and underwent upper and lower endoscopy by Dr. Mario Cochran who discovered a bleeding ulcer which required cautery. She was placed on pantoprazole and treated with IV iron and is following with Dr. Lawson, hematology. She last saw Dr. Valdes earlier today and labs were drawn. Office called and records requested.\par The patient had a flow trying to get up yesterday and landed on her buttocks and right side. Today she is complaining of pain in the right buttocks, worse with weightbearing.

## 2020-01-14 NOTE — REVIEW OF SYSTEMS
[Incontinence] : incontinence [As Noted in HPI] : as noted in HPI [Confused] : confusion [Anxiety] : anxiety [Negative] : Integumentary [Abdominal Pain] : no abdominal pain [Vomiting] : no vomiting [Constipation] : no constipation [Heartburn] : no heartburn [Melena] : no melena [Convulsions] : no convulsions [FreeTextEntry8] : urinary frequency, unchanged

## 2020-01-14 NOTE — PHYSICAL EXAM
[General Appearance - Alert] : alert [General Appearance - In No Acute Distress] : in no acute distress [General Appearance - Well-Appearing] : healthy appearing [PERRL With Normal Accommodation] : pupils were equal in size, round, and reactive to light [Sclera] : the sclera and conjunctiva were normal [Extraocular Movements] : extraocular movements were intact [Normal Oral Mucosa] : normal oral mucosa [Neck Appearance] : the appearance of the neck was normal [Outer Ear] : the ears and nose were normal in appearance [Heart Sounds] : normal S1 and S2 [Jugular Venous Distention Increased] : there was no jugular-venous distention [Auscultation Breath Sounds / Voice Sounds] : lungs were clear to auscultation bilaterally [Bowel Sounds] : normal bowel sounds [Edema] : there was no peripheral edema [Abdomen Soft] : soft [Cervical Lymph Nodes Enlarged Posterior Bilaterally] : posterior cervical [Abdomen Tenderness] : non-tender [Cervical Lymph Nodes Enlarged Anterior Bilaterally] : anterior cervical [Supraclavicular Lymph Nodes Enlarged Bilaterally] : supraclavicular [Axillary Lymph Nodes Enlarged Bilaterally] : axillary [No CVA Tenderness] : no ~M costovertebral angle tenderness [No Spinal Tenderness] : no spinal tenderness [Nail Clubbing] : no clubbing  or cyanosis of the fingernails [Motor Tone] : muscle strength and tone were normal [Musculoskeletal - Swelling] : no joint swelling seen [No Focal Deficits] : no focal deficits [] : no rash [FreeTextEntry1] : palpable tenderness mostly right posterior buttocks and mild pain over right greater trochanter. Able to straight leg raise right leg. Able to actively abduct, adduct and rotate her right lower extremity

## 2020-01-20 ENCOUNTER — RX RENEWAL (OUTPATIENT)
Age: 85
End: 2020-01-20

## 2020-02-20 ENCOUNTER — APPOINTMENT (OUTPATIENT)
Dept: UROGYNECOLOGY | Facility: CLINIC | Age: 85
End: 2020-02-20
Payer: MEDICARE

## 2020-02-20 LAB
BILIRUB UR QL STRIP: NORMAL
CLARITY UR: CLEAR
COLLECTION METHOD: NORMAL
GLUCOSE UR-MCNC: NORMAL
HCG UR QL: 0.2 EU/DL
HGB UR QL STRIP.AUTO: NORMAL
KETONES UR-MCNC: NORMAL
LEUKOCYTE ESTERASE UR QL STRIP: NORMAL
NITRITE UR QL STRIP: POSITIVE
PH UR STRIP: 5
PROT UR STRIP-MCNC: NORMAL
SP GR UR STRIP: 1.02

## 2020-02-20 PROCEDURE — 51701 INSERT BLADDER CATHETER: CPT

## 2020-02-20 PROCEDURE — 99213 OFFICE O/P EST LOW 20 MIN: CPT | Mod: 25

## 2020-02-20 RX ORDER — ESTRADIOL 0.1 MG/G
0.1 CREAM VAGINAL
Qty: 1 | Refills: 3 | Status: ACTIVE | COMMUNITY
Start: 2019-01-17 | End: 1900-01-01

## 2020-02-24 LAB
APPEARANCE: ABNORMAL
BACTERIA UR CULT: ABNORMAL
BACTERIA: ABNORMAL
BILIRUBIN URINE: NEGATIVE
BLOOD URINE: NORMAL
COLOR: YELLOW
GLUCOSE QUALITATIVE U: NEGATIVE
HYALINE CASTS: 1 /LPF
KETONES URINE: NEGATIVE
LEUKOCYTE ESTERASE URINE: ABNORMAL
MICROSCOPIC-UA: NORMAL
NITRITE URINE: POSITIVE
PH URINE: 6
PROTEIN URINE: NEGATIVE
RED BLOOD CELLS URINE: 2 /HPF
SPECIFIC GRAVITY URINE: 1.02
SQUAMOUS EPITHELIAL CELLS: 5 /HPF
UROBILINOGEN URINE: NORMAL
WHITE BLOOD CELLS URINE: 11 /HPF

## 2020-02-27 NOTE — PROCEDURE
[Urinary Frequency] : urinary frequency [Other ___] : [unfilled] [Straight Catheterization] : insertion of a straight catheter [Patient] : the patient [___ Fr Straight Tip] : a [unfilled] in [place] straight tip catheter [None] : there were no complications with the catheter insertion [Cloudy] : cloudy [Culture] : culture [Urinalysis] : urinalysis [No Complications] : no complications [Tolerated Well] : the patient tolerated the procedure well [Post procedure instructions and information given] : Post procedure instructions and information were given and reviewed with patient. [0] : 0 [de-identified] : PVR 30cc's Cloudy urine; +Small Blood; +Nitrites; +Trace Leukocytes

## 2020-02-27 NOTE — HISTORY OF PRESENT ILLNESS
[FreeTextEntry1] : Pt w/ hx severe VVA on Estrace cream presents to office accompanied by JOSEAMary, for c/o past week w/ onset of vaginal burning, dysuria, frequency and urgency.  Denies abd/pelvic pains, hematuria, oliguria, F/C/N/V/D/C.  Pt states she had stopped Estrace cream months ago because she did not understand she had to continue long term use w/ cream.

## 2020-02-27 NOTE — PHYSICAL EXAM
[No Acute Distress] : in no acute distress [Well Nourished] : ~L well nourished [Good Hygeine] : demonstrates good hygeine [Well developed] : well developed [Normal Memory] : ~T memory was ~L unimpaired [Oriented x3] : oriented to person, place, and time [Normal Mood/Affect] : mood and affect are normal [Warm and Dry] : was warm and dry to touch [Normal Gait] : gait was normal [Stooped] : stooped [No Invol. Movements] : no involuntary movements were seen [Vulvar Atrophy] : vulvar atrophy [Labia Majora Erythema] : erythema [Labia Minora Erythema] : erythema [Anxiety] : patient is not anxious [Distended] : not distended [Tenderness] : ~T no ~M abdominal tenderness observed [de-identified] : marked atrophic vaginitis

## 2020-02-27 NOTE — DISCUSSION/SUMMARY
[FreeTextEntry1] : Office Cath urine dip: PVR 30cc's Cloudy urine; +Small Blood; +Nitrites; +Trace Leukocytes\par UA/CS SENT; Pt given Empiric Macrobid Rx and Pyridium Rx to start; will call pt when results available if need to change Rx\par Reinforced for VVA and UTI PPx, con't Estrace cream TIW QHS and Rx w/ refills escribed to pharmacy\par Increase daily PO fluids\par Instructed pt to call the office if any problems or concerns and she verbalized understanding.\par \par

## 2020-04-05 ENCOUNTER — RX RENEWAL (OUTPATIENT)
Age: 85
End: 2020-04-05

## 2020-07-14 ENCOUNTER — RX RENEWAL (OUTPATIENT)
Age: 85
End: 2020-07-14

## 2020-09-24 ENCOUNTER — APPOINTMENT (OUTPATIENT)
Dept: GERIATRICS | Facility: CLINIC | Age: 85
End: 2020-09-24
Payer: MEDICARE

## 2020-09-24 VITALS
RESPIRATION RATE: 15 BRPM | HEIGHT: 60 IN | OXYGEN SATURATION: 95 % | SYSTOLIC BLOOD PRESSURE: 132 MMHG | HEART RATE: 68 BPM | BODY MASS INDEX: 30.04 KG/M2 | DIASTOLIC BLOOD PRESSURE: 80 MMHG | WEIGHT: 153 LBS | TEMPERATURE: 98.1 F

## 2020-09-24 DIAGNOSIS — R10.13 EPIGASTRIC PAIN: ICD-10-CM

## 2020-09-24 DIAGNOSIS — H57.89 OTHER SPECIFIED DISORDERS OF EYE AND ADNEXA: ICD-10-CM

## 2020-09-24 DIAGNOSIS — Z23 ENCOUNTER FOR IMMUNIZATION: ICD-10-CM

## 2020-09-24 DIAGNOSIS — R79.89 OTHER SPECIFIED ABNORMAL FINDINGS OF BLOOD CHEMISTRY: ICD-10-CM

## 2020-09-24 DIAGNOSIS — K59.00 CONSTIPATION, UNSPECIFIED: ICD-10-CM

## 2020-09-24 DIAGNOSIS — R41.3 OTHER AMNESIA: ICD-10-CM

## 2020-09-24 PROCEDURE — G0008: CPT

## 2020-09-24 PROCEDURE — 90662 IIV NO PRSV INCREASED AG IM: CPT

## 2020-09-24 PROCEDURE — 99214 OFFICE O/P EST MOD 30 MIN: CPT | Mod: GC,25

## 2020-09-24 RX ORDER — PANTOPRAZOLE 40 MG/1
40 TABLET, DELAYED RELEASE ORAL TWICE DAILY
Refills: 0 | Status: ACTIVE | COMMUNITY
Start: 2020-09-24

## 2020-09-24 RX ORDER — CLONAZEPAM 0.5 MG/1
0.5 TABLET ORAL DAILY
Refills: 0 | Status: ACTIVE | COMMUNITY
Start: 2020-09-24

## 2020-09-24 NOTE — REVIEW OF SYSTEMS
[Discharge From Eyes] : purulent discharge from the eyes [Constipation] : constipation [Negative] : Psychiatric [FreeTextEntry3] : Left eye

## 2020-09-24 NOTE — PHYSICAL EXAM
[General Appearance - Alert] : alert [General Appearance - In No Acute Distress] : in no acute distress [General Appearance - Well Nourished] : well nourished [General Appearance - Well-Appearing] : healthy appearing [Extraocular Movements] : extraocular movements were intact [Neck Appearance] : the appearance of the neck was normal [Respiration, Rhythm And Depth] : normal respiratory rhythm and effort [Exaggerated Use Of Accessory Muscles For Inspiration] : no accessory muscle use [Auscultation Breath Sounds / Voice Sounds] : lungs were clear to auscultation bilaterally [Heart Rate And Rhythm] : heart rate was normal and rhythm regular [Murmurs] : no murmurs [Bowel Sounds] : normal bowel sounds [Abdomen Soft] : soft [Abdomen Tenderness] : non-tender [No CVA Tenderness] : no ~M costovertebral angle tenderness [No Spinal Tenderness] : no spinal tenderness [Affect] : the affect was normal [Mood] : the mood was normal [Normal Oral Mucosa] : normal oral mucosa [Outer Ear] : the ears and nose were normal in appearance [Both Tympanic Membranes Were Examined] : both tympanic membranes were normal [Edema] : there was no peripheral edema [Cervical Lymph Nodes Enlarged Posterior Bilaterally] : posterior cervical [Cervical Lymph Nodes Enlarged Anterior Bilaterally] : anterior cervical [Supraclavicular Lymph Nodes Enlarged Bilaterally] : supraclavicular [Axillary Lymph Nodes Enlarged Bilaterally] : axillary [Nail Clubbing] : no clubbing  or cyanosis of the fingernails [Involuntary Movements] : no involuntary movements were seen [] : no rash [No Focal Deficits] : no focal deficits

## 2020-09-24 NOTE — END OF VISIT
[] : Fellow [FreeTextEntry3] : Mrs. Garner was seen with Dr. Clinton, geriatric fellow. I obtained a detailed interval history and personally examined the patient. I discussed my findings and plan with the patient, her home health aide and Dr Clinton. I reviewed the fellow's note and agree with assessment and plan.\par Overall the patient is doing quite well. Main issues continue to be chronic anxiety and patient follows with psychiatry Dr. Lilly. Exam appears to be at baseline. Advised patient to continue her current medications and will update labs. Flu shot administered. [Time Spent: ___ minutes] : I have spent [unfilled] minutes of time on the encounter.

## 2020-09-24 NOTE — HISTORY OF PRESENT ILLNESS
[Stable] : Status: Stable [FreeTextEntry1] : A 85 yo woman w/ pmhx of Dementia, HTN, HLD, Osteoporosis presents to the Clinic w/ her care giver for follow up and influenza immunization.\par \par Pt states she recently saw her Gastroenterologist Dr Cochran for complaints of dyspepsia and constipation. The Gastroenterologist recommended for the patient to take PPI as well as Benefiber.\par   [Moderate] : Stage: Moderate

## 2020-10-20 ENCOUNTER — RX RENEWAL (OUTPATIENT)
Age: 85
End: 2020-10-20

## 2020-10-20 RX ORDER — CLOTRIMAZOLE AND BETAMETHASONE DIPROPIONATE 10; .5 MG/G; MG/G
1-0.05 CREAM TOPICAL
Qty: 45 | Refills: 3 | Status: ACTIVE | COMMUNITY
Start: 2019-01-17 | End: 1900-01-01

## 2020-10-29 ENCOUNTER — APPOINTMENT (OUTPATIENT)
Dept: OTOLARYNGOLOGY | Facility: CLINIC | Age: 85
End: 2020-10-29
Payer: MEDICARE

## 2020-10-29 VITALS — TEMPERATURE: 98 F | BODY MASS INDEX: 30.04 KG/M2 | WEIGHT: 153 LBS | HEIGHT: 60 IN

## 2020-10-29 PROCEDURE — 69210 REMOVE IMPACTED EAR WAX UNI: CPT

## 2020-10-29 PROCEDURE — 92567 TYMPANOMETRY: CPT

## 2020-10-29 PROCEDURE — 92557 COMPREHENSIVE HEARING TEST: CPT

## 2020-10-29 PROCEDURE — 99213 OFFICE O/P EST LOW 20 MIN: CPT | Mod: 25

## 2020-10-29 NOTE — HISTORY OF PRESENT ILLNESS
[Hearing Loss] : hearing loss [Nasal Congestion] : nasal congestion [No] : patient does not have a  history of radiation therapy [de-identified] : 84 yo female\par Patient with hx of mild cerumen impaction complains of she had  runny nose, does not use nasal spray. Today she is feeling well. Her aids presents with her today states that she has a hearing loss but patient states she hears well. \par Pt has no ear pain, ear drainage, tinnitus, vertigo, nasal congestion, nasal discharge, epistaxis, sinus infections, facial pain, facial pressure, throat pain, dysphagia or fevers\par \par  [Anxiety] : no anxiety [Dizziness] : no dizziness [Headache] : no headache [Recurrent Otitis Media] : no recurrent otitis media [Otitis Media with Effusion] : no otitis media with effusion [Presbycusis] : no presbycusis [Congenital Ear Malformation] : no congenital ear malformation [Meniere Disease] : no Meniere disease [Otosclerosis] : no otosclerosis [Perilymphatic Fistula] : no perilymphatic fistula [Hypertension] : no hypertension [Loud Noise Exposure] : no history of loud noise exposure [Smoking] : no smoking [Early Onset Hearing Loss] : no early onset hearing loss [Stroke] : no stroke [Facial Pain] : no facial pain [Facial Pressure] : no facial pressure [Diplopia] : no diplopia [Ear Fullness] : no ear fullness [Allergic Rhinitis] : no allergic rhinitis [Asthma] : no asthma [Maxillary Tooth Infection] : no maxillary tooth infection [Septal Deviation] : no septal deviation [Environmental Allergies] : no environmental allergies [Seasonal Allergies] : no seasonal allergies [Adenoidectomy] : no adenoidectomy [Nasal FB Removal] : no nasal foreign body removal [Neck Mass] : no neck mass [Neck Pain] : no neck pain [Chills] : no chills [Cold Intolerance] : no cold intolerance [Cough] : no cough [Fatigue] : no fatigue [Heat Intolerance] : no heat intolerance [Hyperthyroidism] : no hyperthyroidism [Sialadenitis] : no sialadenitis [Hodgkin Disease] : no hodgkin disease [Non-Hodgkin Lymphoma] : no non-hodgkin lymphoma [Tobacco Use] : no tobacco use [Alcohol Use] : no alcohol use [Graves Disease] : no graves disease [Thyroid Cancer] : no thyroid cancer

## 2020-10-29 NOTE — REASON FOR VISIT
[Subsequent Evaluation] : a subsequent evaluation for [Hearing Loss] : hearing loss [Formal Caregiver] : formal caregiver

## 2020-10-29 NOTE — PHYSICAL EXAM
[Midline] : trachea located in midline position [de-identified] : b/l wax  [Normal] : salivary glands are normal

## 2020-10-29 NOTE — ASSESSMENT
[FreeTextEntry1] : Wax:\par -Cerumen is removed from the right and left  ear canal with a curette and suction.\par -Rx:Debrox be placed in both ears on a routine basis to keep them free of wax.\par -Routine debridement suggested to keep the ears free of wax.\par \par Bilateral SNHL:\par -cleared for hearing aids\par -Hearing Test performed to evaluate the extent of hearing loss and to explain pt's symptoms\par

## 2020-10-29 NOTE — DATA REVIEWED
[de-identified] : bilateral mild to moderate sensorineural hearing loss\par Hearing Test performed to evaluate the extent of hearing loss and  to explain pt's symptoms\par

## 2020-12-17 ENCOUNTER — RX RENEWAL (OUTPATIENT)
Age: 85
End: 2020-12-17

## 2020-12-21 PROBLEM — N39.0 ACUTE UTI: Status: RESOLVED | Noted: 2019-01-22 | Resolved: 2020-12-21

## 2020-12-21 PROBLEM — Z87.42 HISTORY OF VULVOVAGINITIS: Status: RESOLVED | Noted: 2019-03-07 | Resolved: 2020-12-21

## 2020-12-23 ENCOUNTER — APPOINTMENT (OUTPATIENT)
Dept: UROGYNECOLOGY | Facility: CLINIC | Age: 85
End: 2020-12-23
Payer: MEDICARE

## 2020-12-23 VITALS — SYSTOLIC BLOOD PRESSURE: 140 MMHG | TEMPERATURE: 95 F | DIASTOLIC BLOOD PRESSURE: 80 MMHG

## 2020-12-23 LAB
BILIRUB UR QL STRIP: NORMAL
CLARITY UR: CLEAR
COLLECTION METHOD: NORMAL
GLUCOSE UR-MCNC: NORMAL
HCG UR QL: 0.2 EU/DL
HGB UR QL STRIP.AUTO: NORMAL
KETONES UR-MCNC: NORMAL
LEUKOCYTE ESTERASE UR QL STRIP: NORMAL
NITRITE UR QL STRIP: NORMAL
PH UR STRIP: 5
PROT UR STRIP-MCNC: NORMAL
SP GR UR STRIP: 1.02

## 2020-12-23 PROCEDURE — 99214 OFFICE O/P EST MOD 30 MIN: CPT | Mod: GC

## 2020-12-23 NOTE — PHYSICAL EXAM
[Chaperone Present] : A chaperone was present in the examining room during all aspects of the physical examination [No Acute Distress] : in no acute distress [Well developed] : well developed [Well Nourished] : ~L well nourished [Normal Mood/Affect] : mood and affect are normal [Warm and Dry] : was warm and dry to touch [Vulvar Atrophy] : vulvar atrophy [Labia Majora] : were normal [Labia Minora] : were normal [Mucosal Prolapse] : had a mucosal prolapse [Normal] : was normal [Atrophy] : atrophy [No Bleeding] : there was no active vaginal bleeding [Tenderness] : ~T no ~M abdominal tenderness observed

## 2020-12-23 NOTE — DISCUSSION/SUMMARY
[FreeTextEntry1] : \johnnie Ledezma is an 84yo with vulvovaginal atrophy who presents today with dysuria, frequency, urgency. Udip showed small blood and trace leukocytes. Will send for UA, UC. Will start empiric treatment with nitrofurantoin. She will continue using the estrace and lotrisone.

## 2020-12-23 NOTE — END OF VISIT
[FreeTextEntry3] : pt seen, I have reviewed the above and agree with all pertinent clinical information including history and physical examination and agree with treatment plan.\par  Dupixent Counseling: I discussed with the patient the risks of dupilumab including but not limited to eye infection and irritation, cold sores, injection site reactions, worsening of asthma, allergic reactions and increased risk of parasitic infection.  Live vaccines should be avoided while taking dupilumab. Dupilumab will also interact with certain medications such as warfarin and cyclosporine. The patient understands that monitoring is required and they must alert us or the primary physician if symptoms of infection or other concerning signs are noted.

## 2020-12-28 LAB
APPEARANCE: CLEAR
BACTERIA: NEGATIVE
BILIRUBIN URINE: NEGATIVE
BLOOD URINE: NEGATIVE
COLOR: NORMAL
GLUCOSE QUALITATIVE U: NEGATIVE
HYALINE CASTS: 2 /LPF
KETONES URINE: NEGATIVE
LEUKOCYTE ESTERASE URINE: ABNORMAL
MICROSCOPIC-UA: NORMAL
NITRITE URINE: NEGATIVE
PH URINE: 5.5
PROTEIN URINE: NORMAL
RED BLOOD CELLS URINE: 1 /HPF
SPECIFIC GRAVITY URINE: 1.02
SQUAMOUS EPITHELIAL CELLS: 2 /HPF
UROBILINOGEN URINE: NORMAL
WHITE BLOOD CELLS URINE: 11 /HPF

## 2021-01-09 ENCOUNTER — RX RENEWAL (OUTPATIENT)
Age: 86
End: 2021-01-09

## 2021-02-01 NOTE — HISTORY OF PRESENT ILLNESS
Conjuntivae and eyelids appear normal, Sclerae : White without injection [FreeTextEntry1] : Pt with severe vaginal atrophy and urinary incontinence s/p Bactrim DS for UTI presents today accompanied by HHA, Mary, for Estrace and Lotrisone cream teachings.  Pt has completed Bactrim DS, but UCS report today shows Resistance to Bactrim and Rx Macrobid escribed to [facility]'s [facility] to start now.  Pt's aide reports pt continues to have urinary incontinence and wearing adult diapers.  Denies any abd/pelvic pain, hematuria, F/C/N/V/D/C.  Pt c/o pain in lower back/rectum, but has not scheduled appt with PCP or GI for eval as recommended by Dr. Acevedo.  Pt has been applying topical Zinc oxide/Desitin ointment daily and brings new Rx creams to start today.

## 2021-03-04 ENCOUNTER — RX RENEWAL (OUTPATIENT)
Age: 86
End: 2021-03-04

## 2021-03-17 ENCOUNTER — APPOINTMENT (OUTPATIENT)
Dept: UROGYNECOLOGY | Facility: CLINIC | Age: 86
End: 2021-03-17

## 2021-04-07 ENCOUNTER — INPATIENT (INPATIENT)
Facility: HOSPITAL | Age: 86
LOS: 5 days | Discharge: HOME | DRG: 552 | End: 2021-04-13
Attending: STUDENT IN AN ORGANIZED HEALTH CARE EDUCATION/TRAINING PROGRAM | Admitting: HOSPITALIST
Payer: MEDICARE

## 2021-04-07 VITALS
DIASTOLIC BLOOD PRESSURE: 68 MMHG | TEMPERATURE: 98 F | HEART RATE: 54 BPM | SYSTOLIC BLOOD PRESSURE: 99 MMHG | RESPIRATION RATE: 17 BRPM | WEIGHT: 147.93 LBS | HEIGHT: 64 IN

## 2021-04-07 DIAGNOSIS — Z96.659 PRESENCE OF UNSPECIFIED ARTIFICIAL KNEE JOINT: Chronic | ICD-10-CM

## 2021-04-07 DIAGNOSIS — I10 ESSENTIAL (PRIMARY) HYPERTENSION: ICD-10-CM

## 2021-04-07 DIAGNOSIS — Z29.9 ENCOUNTER FOR PROPHYLACTIC MEASURES, UNSPECIFIED: ICD-10-CM

## 2021-04-07 DIAGNOSIS — K59.00 CONSTIPATION, UNSPECIFIED: ICD-10-CM

## 2021-04-07 DIAGNOSIS — M43.10 SPONDYLOLISTHESIS, SITE UNSPECIFIED: ICD-10-CM

## 2021-04-07 DIAGNOSIS — M54.5 LOW BACK PAIN: ICD-10-CM

## 2021-04-07 DIAGNOSIS — Z02.9 ENCOUNTER FOR ADMINISTRATIVE EXAMINATIONS, UNSPECIFIED: ICD-10-CM

## 2021-04-07 DIAGNOSIS — F03.90 UNSPECIFIED DEMENTIA WITHOUT BEHAVIORAL DISTURBANCE: ICD-10-CM

## 2021-04-07 DIAGNOSIS — J98.4 OTHER DISORDERS OF LUNG: ICD-10-CM

## 2021-04-07 LAB
ALBUMIN SERPL ELPH-MCNC: 3.7 G/DL — SIGNIFICANT CHANGE UP (ref 3.3–5)
ALP SERPL-CCNC: 55 U/L — SIGNIFICANT CHANGE UP (ref 40–120)
ALT FLD-CCNC: 16 U/L — SIGNIFICANT CHANGE UP (ref 10–45)
ANION GAP SERPL CALC-SCNC: 10 MMOL/L — SIGNIFICANT CHANGE UP (ref 5–17)
APPEARANCE UR: CLEAR — SIGNIFICANT CHANGE UP
APTT BLD: 32.1 SEC — SIGNIFICANT CHANGE UP (ref 27.5–35.5)
AST SERPL-CCNC: 26 U/L — SIGNIFICANT CHANGE UP (ref 10–40)
BACTERIA # UR AUTO: NEGATIVE — SIGNIFICANT CHANGE UP
BASOPHILS # BLD AUTO: 0.05 K/UL — SIGNIFICANT CHANGE UP (ref 0–0.2)
BASOPHILS NFR BLD AUTO: 1 % — SIGNIFICANT CHANGE UP (ref 0–2)
BILIRUB SERPL-MCNC: 0.4 MG/DL — SIGNIFICANT CHANGE UP (ref 0.2–1.2)
BILIRUB UR-MCNC: NEGATIVE — SIGNIFICANT CHANGE UP
BUN SERPL-MCNC: 11 MG/DL — SIGNIFICANT CHANGE UP (ref 7–23)
CALCIUM SERPL-MCNC: 9.3 MG/DL — SIGNIFICANT CHANGE UP (ref 8.4–10.5)
CHLORIDE SERPL-SCNC: 106 MMOL/L — SIGNIFICANT CHANGE UP (ref 96–108)
CO2 SERPL-SCNC: 25 MMOL/L — SIGNIFICANT CHANGE UP (ref 22–31)
COLOR SPEC: SIGNIFICANT CHANGE UP
CREAT SERPL-MCNC: 0.93 MG/DL — SIGNIFICANT CHANGE UP (ref 0.5–1.3)
DIFF PNL FLD: NEGATIVE — SIGNIFICANT CHANGE UP
EOSINOPHIL # BLD AUTO: 0.08 K/UL — SIGNIFICANT CHANGE UP (ref 0–0.5)
EOSINOPHIL NFR BLD AUTO: 1.5 % — SIGNIFICANT CHANGE UP (ref 0–6)
EPI CELLS # UR: 1 /HPF — SIGNIFICANT CHANGE UP
GLUCOSE SERPL-MCNC: 82 MG/DL — SIGNIFICANT CHANGE UP (ref 70–99)
GLUCOSE UR QL: NEGATIVE — SIGNIFICANT CHANGE UP
HCT VFR BLD CALC: 38.7 % — SIGNIFICANT CHANGE UP (ref 34.5–45)
HGB BLD-MCNC: 12.5 G/DL — SIGNIFICANT CHANGE UP (ref 11.5–15.5)
HYALINE CASTS # UR AUTO: 0 /LPF — SIGNIFICANT CHANGE UP (ref 0–2)
IMM GRANULOCYTES NFR BLD AUTO: 0.4 % — SIGNIFICANT CHANGE UP (ref 0–1.5)
INR BLD: 1.04 RATIO — SIGNIFICANT CHANGE UP (ref 0.88–1.16)
KETONES UR-MCNC: NEGATIVE — SIGNIFICANT CHANGE UP
LEUKOCYTE ESTERASE UR-ACNC: NEGATIVE — SIGNIFICANT CHANGE UP
LYMPHOCYTES # BLD AUTO: 1.73 K/UL — SIGNIFICANT CHANGE UP (ref 1–3.3)
LYMPHOCYTES # BLD AUTO: 33.3 % — SIGNIFICANT CHANGE UP (ref 13–44)
MCHC RBC-ENTMCNC: 32.3 GM/DL — SIGNIFICANT CHANGE UP (ref 32–36)
MCHC RBC-ENTMCNC: 32.9 PG — SIGNIFICANT CHANGE UP (ref 27–34)
MCV RBC AUTO: 101.8 FL — HIGH (ref 80–100)
MONOCYTES # BLD AUTO: 0.49 K/UL — SIGNIFICANT CHANGE UP (ref 0–0.9)
MONOCYTES NFR BLD AUTO: 9.4 % — SIGNIFICANT CHANGE UP (ref 2–14)
NEUTROPHILS # BLD AUTO: 2.82 K/UL — SIGNIFICANT CHANGE UP (ref 1.8–7.4)
NEUTROPHILS NFR BLD AUTO: 54.4 % — SIGNIFICANT CHANGE UP (ref 43–77)
NITRITE UR-MCNC: NEGATIVE — SIGNIFICANT CHANGE UP
NRBC # BLD: 0 /100 WBCS — SIGNIFICANT CHANGE UP (ref 0–0)
PH UR: 6.5 — SIGNIFICANT CHANGE UP (ref 5–8)
PLATELET # BLD AUTO: 199 K/UL — SIGNIFICANT CHANGE UP (ref 150–400)
POTASSIUM SERPL-MCNC: 4.2 MMOL/L — SIGNIFICANT CHANGE UP (ref 3.5–5.3)
POTASSIUM SERPL-SCNC: 4.2 MMOL/L — SIGNIFICANT CHANGE UP (ref 3.5–5.3)
PROT SERPL-MCNC: 6.8 G/DL — SIGNIFICANT CHANGE UP (ref 6–8.3)
PROT UR-MCNC: NEGATIVE — SIGNIFICANT CHANGE UP
PROTHROM AB SERPL-ACNC: 12.5 SEC — SIGNIFICANT CHANGE UP (ref 10.6–13.6)
RBC # BLD: 3.8 M/UL — SIGNIFICANT CHANGE UP (ref 3.8–5.2)
RBC # FLD: 14.6 % — HIGH (ref 10.3–14.5)
RBC CASTS # UR COMP ASSIST: 6 /HPF — HIGH (ref 0–4)
SARS-COV-2 RNA SPEC QL NAA+PROBE: SIGNIFICANT CHANGE UP
SODIUM SERPL-SCNC: 141 MMOL/L — SIGNIFICANT CHANGE UP (ref 135–145)
SP GR SPEC: 1.01 — SIGNIFICANT CHANGE UP (ref 1.01–1.02)
TROPONIN T, HIGH SENSITIVITY RESULT: 10 NG/L — SIGNIFICANT CHANGE UP (ref 0–51)
UROBILINOGEN FLD QL: NEGATIVE — SIGNIFICANT CHANGE UP
WBC # BLD: 5.19 K/UL — SIGNIFICANT CHANGE UP (ref 3.8–10.5)
WBC # FLD AUTO: 5.19 K/UL — SIGNIFICANT CHANGE UP (ref 3.8–10.5)
WBC UR QL: 1 /HPF — SIGNIFICANT CHANGE UP (ref 0–5)

## 2021-04-07 PROCEDURE — 93010 ELECTROCARDIOGRAM REPORT: CPT | Mod: GC

## 2021-04-07 PROCEDURE — 72131 CT LUMBAR SPINE W/O DYE: CPT | Mod: 26,MA

## 2021-04-07 PROCEDURE — 99285 EMERGENCY DEPT VISIT HI MDM: CPT | Mod: CS,GC

## 2021-04-07 PROCEDURE — 99223 1ST HOSP IP/OBS HIGH 75: CPT | Mod: AI

## 2021-04-07 PROCEDURE — 71045 X-RAY EXAM CHEST 1 VIEW: CPT | Mod: 26

## 2021-04-07 PROCEDURE — 71250 CT THORAX DX C-: CPT | Mod: 26

## 2021-04-07 RX ORDER — CELECOXIB 200 MG/1
100 CAPSULE ORAL
Refills: 0 | Status: COMPLETED | OUTPATIENT
Start: 2021-04-07 | End: 2021-04-12

## 2021-04-07 RX ORDER — LIDOCAINE 4 G/100G
1 CREAM TOPICAL DAILY
Refills: 0 | Status: DISCONTINUED | OUTPATIENT
Start: 2021-04-07 | End: 2021-04-13

## 2021-04-07 RX ORDER — POLYETHYLENE GLYCOL 3350 17 G/17G
17 POWDER, FOR SOLUTION ORAL DAILY
Refills: 0 | Status: DISCONTINUED | OUTPATIENT
Start: 2021-04-07 | End: 2021-04-13

## 2021-04-07 RX ORDER — DONEPEZIL HYDROCHLORIDE 10 MG/1
1 TABLET, FILM COATED ORAL
Qty: 0 | Refills: 0 | DISCHARGE

## 2021-04-07 RX ORDER — ENOXAPARIN SODIUM 100 MG/ML
40 INJECTION SUBCUTANEOUS DAILY
Refills: 0 | Status: DISCONTINUED | OUTPATIENT
Start: 2021-04-07 | End: 2021-04-13

## 2021-04-07 RX ORDER — TRAMADOL HYDROCHLORIDE 50 MG/1
50 TABLET ORAL EVERY 6 HOURS
Refills: 0 | Status: DISCONTINUED | OUTPATIENT
Start: 2021-04-07 | End: 2021-04-13

## 2021-04-07 RX ORDER — FLUOXETINE HCL 10 MG
60 CAPSULE ORAL DAILY
Refills: 0 | Status: DISCONTINUED | OUTPATIENT
Start: 2021-04-07 | End: 2021-04-13

## 2021-04-07 RX ORDER — DONEPEZIL HYDROCHLORIDE 10 MG/1
10 TABLET, FILM COATED ORAL AT BEDTIME
Refills: 0 | Status: DISCONTINUED | OUTPATIENT
Start: 2021-04-07 | End: 2021-04-13

## 2021-04-07 RX ORDER — SENNA PLUS 8.6 MG/1
2 TABLET ORAL AT BEDTIME
Refills: 0 | Status: DISCONTINUED | OUTPATIENT
Start: 2021-04-07 | End: 2021-04-13

## 2021-04-07 RX ORDER — PANTOPRAZOLE SODIUM 20 MG/1
40 TABLET, DELAYED RELEASE ORAL
Refills: 0 | Status: DISCONTINUED | OUTPATIENT
Start: 2021-04-07 | End: 2021-04-13

## 2021-04-07 RX ORDER — FLUOXETINE HCL 10 MG
1 CAPSULE ORAL
Qty: 0 | Refills: 0 | DISCHARGE

## 2021-04-07 RX ORDER — LOSARTAN POTASSIUM 100 MG/1
1 TABLET, FILM COATED ORAL
Qty: 0 | Refills: 0 | DISCHARGE

## 2021-04-07 RX ORDER — ACETAMINOPHEN 500 MG
650 TABLET ORAL EVERY 6 HOURS
Refills: 0 | Status: DISCONTINUED | OUTPATIENT
Start: 2021-04-07 | End: 2021-04-13

## 2021-04-07 RX ORDER — QUETIAPINE FUMARATE 200 MG/1
1 TABLET, FILM COATED ORAL
Qty: 0 | Refills: 0 | DISCHARGE

## 2021-04-07 RX ORDER — QUETIAPINE FUMARATE 200 MG/1
25 TABLET, FILM COATED ORAL THREE TIMES A DAY
Refills: 0 | Status: DISCONTINUED | OUTPATIENT
Start: 2021-04-07 | End: 2021-04-13

## 2021-04-07 RX ORDER — OXYCODONE HYDROCHLORIDE 5 MG/1
5 TABLET ORAL EVERY 6 HOURS
Refills: 0 | Status: DISCONTINUED | OUTPATIENT
Start: 2021-04-07 | End: 2021-04-07

## 2021-04-07 RX ORDER — LOSARTAN POTASSIUM 100 MG/1
50 TABLET, FILM COATED ORAL DAILY
Refills: 0 | Status: DISCONTINUED | OUTPATIENT
Start: 2021-04-07 | End: 2021-04-07

## 2021-04-07 RX ORDER — LOSARTAN POTASSIUM 100 MG/1
50 TABLET, FILM COATED ORAL DAILY
Refills: 0 | Status: DISCONTINUED | OUTPATIENT
Start: 2021-04-07 | End: 2021-04-13

## 2021-04-07 RX ORDER — PANTOPRAZOLE SODIUM 20 MG/1
40 TABLET, DELAYED RELEASE ORAL
Refills: 0 | Status: DISCONTINUED | OUTPATIENT
Start: 2021-04-07 | End: 2021-04-07

## 2021-04-07 RX ADMIN — LOSARTAN POTASSIUM 50 MILLIGRAM(S): 100 TABLET, FILM COATED ORAL at 16:04

## 2021-04-07 RX ADMIN — TRAMADOL HYDROCHLORIDE 50 MILLIGRAM(S): 50 TABLET ORAL at 23:32

## 2021-04-07 RX ADMIN — SENNA PLUS 2 TABLET(S): 8.6 TABLET ORAL at 21:29

## 2021-04-07 RX ADMIN — CELECOXIB 100 MILLIGRAM(S): 200 CAPSULE ORAL at 20:48

## 2021-04-07 RX ADMIN — POLYETHYLENE GLYCOL 3350 17 GRAM(S): 17 POWDER, FOR SOLUTION ORAL at 19:56

## 2021-04-07 RX ADMIN — Medication 60 MILLIGRAM(S): at 16:47

## 2021-04-07 RX ADMIN — DONEPEZIL HYDROCHLORIDE 10 MILLIGRAM(S): 10 TABLET, FILM COATED ORAL at 21:29

## 2021-04-07 RX ADMIN — QUETIAPINE FUMARATE 25 MILLIGRAM(S): 200 TABLET, FILM COATED ORAL at 16:06

## 2021-04-07 RX ADMIN — CELECOXIB 100 MILLIGRAM(S): 200 CAPSULE ORAL at 19:56

## 2021-04-07 RX ADMIN — ENOXAPARIN SODIUM 40 MILLIGRAM(S): 100 INJECTION SUBCUTANEOUS at 18:38

## 2021-04-07 NOTE — H&P ADULT - PROBLEM SELECTOR PLAN 1
-L3-4 and L4-5 suspected impingement of both exiting nerve roots.  -L1-L2 suspected impingement of the exiting right L1 nerve root.  -will give lidocaine patch PRN for now, can increase to more pain medication   -MR lumbrosacral spine to further evaluate, consult spine service   -PT/OT consult

## 2021-04-07 NOTE — H&P ADULT - NSICDXPASTMEDICALHX_GEN_ALL_CORE_FT
PAST MEDICAL HISTORY:  Alcohol abuse     Depression, unspecified depression type     Essential hypertension     Iron deficiency anemia, unspecified iron deficiency anemia type

## 2021-04-07 NOTE — H&P ADULT - NSHPSOCIALHISTORY_GEN_ALL_CORE
lives at home with a home health aid that helps with ADLs   non smoker, drinks in the past   does PT 1-2x per week

## 2021-04-07 NOTE — ED PROVIDER NOTE - CARE PLAN
Principal Discharge DX:	Acute low back pain, unspecified back pain laterality, unspecified whether sciatica present  Secondary Diagnosis:	Gait instability  Secondary Diagnosis:	Dementia  Secondary Diagnosis:	Sinus bradycardia

## 2021-04-07 NOTE — H&P ADULT - NSHPPHYSICALEXAM_GEN_ALL_CORE
T(C): 36.6 (04-07-21 @ 10:35), Max: 36.6 (04-07-21 @ 10:35)  HR: 50 (04-07-21 @ 14:57) (50 - 54)  BP: 130/86 (04-07-21 @ 14:57) (99/68 - 130/86)  RR: 16 (04-07-21 @ 14:57) (16 - 17)  SpO2: 97% (04-07-21 @ 14:57) (95% - 97%)  PHYSICAL EXAM:  GENERAL: NAD, well-developed  HEAD:  Atraumatic, Normocephalic  EYES: EOMI, PERRLA, conjunctiva and sclera clear  NECK: Supple, No JVD  CHEST/LUNG: Clear to auscultation bilaterally; No wheeze  HEART: Regular rate and rhythm; No murmurs, rubs, or gallops  ABDOMEN: Soft, Nontender, Nondistended; Bowel sounds present  EXTREMITIES:, No clubbing, cyanosis, or edema  PSYCH: AAOx3  NEUROLOGY: non-focal  SKIN: No rashes or lesions T(C): 36.6 (04-07-21 @ 10:35), Max: 36.6 (04-07-21 @ 10:35)  HR: 50 (04-07-21 @ 14:57) (50 - 54)  BP: 130/86 (04-07-21 @ 14:57) (99/68 - 130/86)  RR: 16 (04-07-21 @ 14:57) (16 - 17)  SpO2: 97% (04-07-21 @ 14:57) (95% - 97%)  PHYSICAL EXAM:  GENERAL: NAD, well-developed  HEAD:  Atraumatic, Normocephalic  EYES: EOMI, PERRLA, conjunctiva and sclera clear  NECK: Supple, No JVD  CHEST/LUNG: Clear to auscultation bilaterally; No wheeze  HEART: bradycardic, no murmurs   ABDOMEN: soft and non distended but no tenderness to palpation   EXTREMITIES:, No clubbing, cyanosis, or edema  PSYCH: AAOx3  NEUROLOGY: non-focal  MSK: moving all 4 extremities spontaneously, straight leg raise test on right side with reproducible pain in the hamstring   SKIN: No rashes or lesions

## 2021-04-07 NOTE — H&P ADULT - ASSESSMENT
85 year old female here for acute lower back pain found to have possible nerve impingment on CT scan.

## 2021-04-07 NOTE — ED PROVIDER NOTE - OBJECTIVE STATEMENT
84 y/o female with a h/o dementia, 86 y/o female with a h/o dementia, HTH, depression, alcohol abuse, and iron deficiency anemia, presenting with lower back pain, which started 4-5 days ago. Per daughter, patient was at physical therapy when she started having lower back pain and difficulty ambulating. Gradual onset, moderate severity, no exacerbating or alleviating factors. Patient denies any urinary/bowel incontinence, saddle anesthesia, LE numbness/tingling, fever, chills, shortness of breath, or chest pain. Pt states she feels unsteady on her feet but denies any lightheadedness or dizziness.

## 2021-04-07 NOTE — H&P ADULT - PROBLEM SELECTOR PLAN 3
-possible cavitary lesion of lung   -likely hiatal hernia, but will get CT chest to further evaluate

## 2021-04-07 NOTE — H&P ADULT - NSHPLABSRESULTS_GEN_ALL_CORE
Personally reviewed imaging, labs, EKG  LABS:                        12.5   5.19  )-----------( 199      ( 2021 11:33 )             38.7         141  |  106  |  11  ----------------------------<  82  4.2   |  25  |  0.93    Ca    9.3      2021 11:33    TPro  6.8  /  Alb  3.7  /  TBili  0.4  /  DBili  x   /  AST  26  /  ALT  16  /  AlkPhos  55  -07    PT/INR - ( 2021 11:33 )   PT: 12.5 sec;   INR: 1.04 ratio         PTT - ( 2021 11:33 )  PTT:32.1 sec      Urinalysis Basic - ( 2021 14:00 )    Color: Light Yellow / Appearance: Clear / S.013 / pH: x  Gluc: x / Ketone: Negative  / Bili: Negative / Urobili: Negative   Blood: x / Protein: Negative / Nitrite: Negative   Leuk Esterase: Negative / RBC: 6 /hpf / WBC 1 /HPF   Sq Epi: x / Non Sq Epi: 1 /hpf / Bacteria: Negative        RADIOLOGY & ADDITIONAL TESTS:  NTERPRETATION:  CT LUMBAR SPINE    INDICATIONS: lbp eval for fx, No additional history was provided.    TECHNIQUE:  Serial axial images were obtained using multislice helical technique. Sagittal and coronal reformatted images were performed.    COMPARISON EXAMINATION: None available at this time.    FINDINGS:    Osteopenia.    Old appearing anterior wedge compression fracture defect at T12.    Bilateral pars defect at L5-S1 with grade 1 to grade 2 anterolisthesis.    Multilevel degenerative osteoarthritis is present. Findings include marginal osteophytes anteriorly, facet joint hypertrophy and  osteophytes. Multilevel degenerative disc disease is present. Findings include loss of disc space height and endplate sclerosis.    ALIGNMENT: Levoscoliosis. Exaggerated lordotic curvature.    L1-L2:  Severe degenerative changes with severe loss of disc height worse in the posterior and right disc space, vacuum disc phenomenon, sclerosis. There are right-sided nearly bridging osteophytes. No significant canal stenosis. Moderate to severe right-sided neural foraminal stenosis is suspected with small bone spurring in the right neural foramen. Moderate left-sided neural foraminal stenosis.    L2-L3:  Severe degenerative changes with severe loss of disc height worse in the posterior and right disc space with vacuum disc phenomenon. Mild sclerosis. No significant canal stenosis. Moderate bilateral neural foraminal stenosis is suspected.    L3-L4:  Moderate to severe disc space narrowing worse in the posterior and left-sided disc space with vacuum disc phenomenon, sclerosis and mild osteophytosis. Mild canal stenosis. Suspected mild broad-based disc bulge with mild facet hypertrophy. Suspected moderate to severe bilateral neural foraminal stenosis.    L4-L5:  Moderate loss of mostly posterior disc height with grade 1 anterolisthesis and vacuum disc phenomenon. Mild sclerosis. Mild broad-based disc bulge. Mild AP and moderate transverse canal stenosis. Moderate bilateral facet hypertrophy. Moderate to severe bilateral neural foraminal stenosis suspected.    L5-S1:  Bilateral pars defect with grade 1 to grade 2 anterolisthesis of L5 on S1. Vacuum disc phenomenon. Sclerosis and mild osteophytosis. No significant AP canal stenosis. Moderate bilateral facet hypertrophy. Moderate to severe left-sided neural foraminal stenosis with tiny bone spur in the neural foramen. Moderate to severe right-sided neural foraminal stenosis.    MISCELLANEOUS:  Diverticulosis in the sigmoid colon.    IMPRESSION:    Bilateral pars defect L5-S1 with grade 1 to grade 2 anterolisthesis and suspected impingement of both exiting L5 nerve roots.    L3-4 and L4-5 suspected impingement of both exiting nerve roots.    L1-L2 suspected impingement of the exiting right L1 nerve root.    Old appearing anterior wedge compression fracture defect at T12.    Levoscoliosis with exaggerated lordotic curvature and marked degenerative changes throughout the lumbar spine.      XRAY:     IMPRESSION:  No focal infiltrate. Hiatal hernia versus cavitating mass. Consider two views of the chest or CT chest for further evaluation.

## 2021-04-07 NOTE — ED PROVIDER NOTE - ATTENDING CONTRIBUTION TO CARE
lbp  pt somewhat confused  dw daughter  No numbness / tingling / urinary incont or retention / bowel probs / ivdu / fevers.   Cannot walk due to pain  L paraspinal ttp wo midline ttp  5/5 ehl bl, sensory intact bl le  ct for poss fx. Symptomatic treatment. no red flags, will admit for further eval.

## 2021-04-07 NOTE — H&P ADULT - ATTENDING COMMENTS
85F w/ PMHx of HTN, anxiety p/w acute low back pain over the last two weeks w/ radiation to RLE. She also reports increasing constipation over the last month. No bladder incontinence or bowel incontinence. At baseline she is able to ambulate with walker, now wheelchair dependent due to pain.     Exam is notable for severe point tenderness over midline lumbar spine and +straight leg raise. Strength 4/5 bilaterally, symmetric, pain limited.    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 No obv cord compression or cauda equina on CT. D/w spine and will obtain MRI. Formal consult pending MRI.     Standing NSAID for pain  Tylenol PRN  Oxycodone if severe  She is already on SSRI. Defer gabapentin or pregabalin due to limited evidence of effectiveness and high risk of side effect in the elderly.   Dw spine--will obtain MRI before formal consult.  PT  Dispo pending MRI and adequate pain control 85F w/ PMHx of HTN, anxiety p/w acute low back pain over the last two weeks w/ radiation to RLE. She also reports increasing constipation over the last month. No bladder incontinence or bowel incontinence. At baseline she is able to ambulate with walker, now wheelchair dependent due to pain.     Exam is notable for severe point tenderness over midline lumbar spine and +straight leg raise. Strength 4/5 bilaterally, symmetric, pain limited.    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 No obv cord compression or cauda equina on CT. D/w spine and will obtain MRI. Formal consult pending MRI.     Standing NSAID for pain  Tylenol PRN  Tramadol if severe pain  She is already on SSRI. Defer gabapentin or pregabalin due to limited evidence of effectiveness and high risk of side effect in the elderly.   Per spine will obtain MRI before formal consult.  PT  Dispo pending MRI and adequate pain control

## 2021-04-07 NOTE — ED ADULT NURSE NOTE - NSIMPLEMENTINTERV_GEN_ALL_ED
Implemented All Fall Risk Interventions:  [place]rad to call system. Call bell, personal items and telephone within reach. Instruct patient to call for assistance. Room bathroom lighting operational. Non-slip footwear when patient is off stretcher. Physically safe environment: no spills, clutter or unnecessary equipment. Stretcher in lowest position, wheels locked, appropriate side rails in place. Provide visual cue, wrist band, yellow gown, etc. Monitor gait and stability. Monitor for mental status changes and reorient to person, place, and time. Review medications for side effects contributing to fall risk. Reinforce activity limits and safety measures with patient and family.

## 2021-04-07 NOTE — ED PROVIDER NOTE - CLINICAL SUMMARY MEDICAL DECISION MAKING FREE TEXT BOX
Presenting with back pain and difficulty ambulating. Pt in NAD, VSS.   Pt with sinus bradycardia which goes into the 40s with occasional PVCs. Pt hemodynamically stable.   CXR shows possible hiatal hernia vs cavitating lesion, will obtain CT chest.   CT lumbar shows L5 nerve impingement, anterolithesis of L5 on S1.     Admitted to IM.

## 2021-04-07 NOTE — ED ADULT NURSE NOTE - OBJECTIVE STATEMENT
85 year old female pt presented to the ED stating she feels unstable on her feet, pt able to stand but unable to ambulate, pt denies any recent trauma, denies any incontinence , denies any chest pain, no nausea no vomiting no shortness of breath, abd soft non tender non distended, lung field cta, states left upper arm still feel sore s/p covid vaccine x 1 month ago, no redness no swelling to site, pt states she feels she needs to urinate but cant ,bladder scanner shows 137 cc 85 year old female pt presented to the ED stating she feels unstable on her feet, pt able to stand but unable to ambulate, pt denies any recent trauma, denies any incontinence , denies any chest pain, no nausea no vomiting no shortness of breath, abd soft non tender non distended, lung field cta, states left upper arm still feel sore s/p covid vaccine x 1 month ago, no redness no swelling to site, pt states she feels she needs to urinate but cant ,bladder scanner shows 137 cc, pt is being repetitive after being reeducated

## 2021-04-07 NOTE — ED ADULT NURSE REASSESSMENT NOTE - NS ED NURSE REASSESS COMMENT FT1
Primafit applied and hooked up to suction, pt educated on how primafit works, pt reports understanding that she can urinate freely.

## 2021-04-07 NOTE — H&P ADULT - NSHPREVIEWOFSYSTEMS_GEN_ALL_CORE
REVIEW OF SYSTEMS:  CONSTITUTIONAL: No weakness, fevers or chills  EYES/ENT: No visual changes;  No vertigo or throat pain   NECK: No pain or stiffness  RESPIRATORY: No cough, wheezing, hemoptysis; No shortness of breath  CARDIOVASCULAR: No chest pain or palpitations  GASTROINTESTINAL: No abdominal or epigastric pain. No nausea, vomiting, or hematemesis; No diarrhea or constipation. No melena or hematochezia.  GENITOURINARY: No dysuria, frequency or hematuria  NEUROLOGICAL: No numbness or weakness  SKIN: No itching, burning, rashes, or lesions   All other review of systems is negative unless indicated above. REVIEW OF SYSTEMS:  CONSTITUTIONAL: No weakness, fevers or chills  EYES/ENT: No visual changes;  No vertigo or throat pain   NECK: No pain or stiffness  RESPIRATORY: No cough, wheezing, hemoptysis; No shortness of breath  CARDIOVASCULAR: No chest pain or palpitations  GASTROINTESTINAL: No abdominal or epigastric pain. No nausea, vomiting, or hematemesis; No diarrhea or constipation. No melena or hematochezia.  GENITOURINARY: No dysuria, frequency or hematuria  NEUROLOGICAL: No numbness or weakness  SKIN: No itching, burning, rashes, or lesions   MSK: pain in the lower back with gait instability   All other review of systems is negative unless indicated above. REVIEW OF SYSTEMS:  CONSTITUTIONAL: No weakness, fevers or chills  EYES/ENT: No visual changes;  No vertigo or throat pain   NECK: No pain or stiffness  RESPIRATORY: No cough, wheezing, hemoptysis; No shortness of breath  CARDIOVASCULAR: No chest pain or palpitations  GASTROINTESTINAL: No abdominal or epigastric pain. No nausea, vomiting, or hematemesis; No diarrhea or constipation. No melena or hematochezia.  GENITOURINARY: No dysuria, frequency or hematuria, + hesitancy   NEUROLOGICAL: No numbness or weakness  SKIN: No itching, burning, rashes, or lesions   MSK: pain in the lower back with gait instability   All other review of systems is negative unless indicated above.

## 2021-04-07 NOTE — H&P ADULT - HISTORY OF PRESENT ILLNESS
85 year old female with pmhx of hypertension, anxiety/depression, lower back pain complicated by a diagnosis of sciatica in the past who presents to the hospital today after being dropped off by her daughter for acute lower back pain and gait instability. Onset of patient's gait instability and back pain started 10 days ago per patient. The pain is located in the middle of her lower back (lumbar region) and radiates to her rectum. The pain is described as dull and gnawing per patient and is present pretty much throughout the day. The patient has taken tylenol at home with minimal relief. There have not been any traumatic incidents but patient has been unstable and sometimes slips when she is getting out of bed or getting off of the toilet. She gets physical therapy 1-2 times a week but this week she has been unable to participate because of pain in the back. Denies numbness and tingling in the groin area or in the bilateral lower extremities, denies fevers or chills, no associated bladder or bowel incontinence but the patient does express that she is constipated and feels like she needs to move her bowels but nothing is coming out. She is able to move her lower extremities spontaneously.     Her blood pressure remained stable in the ED but there were episodes of sinus bradycardia with heart rates to the 40s-50s.

## 2021-04-07 NOTE — ED PROVIDER NOTE - PMH
Alcohol abuse    Depression, unspecified depression type    Essential hypertension    Iron deficiency anemia, unspecified iron deficiency anemia type

## 2021-04-08 DIAGNOSIS — K44.9 DIAPHRAGMATIC HERNIA WITHOUT OBSTRUCTION OR GANGRENE: ICD-10-CM

## 2021-04-08 LAB
ANION GAP SERPL CALC-SCNC: 12 MMOL/L — SIGNIFICANT CHANGE UP (ref 5–17)
BUN SERPL-MCNC: 11 MG/DL — SIGNIFICANT CHANGE UP (ref 7–23)
CALCIUM SERPL-MCNC: 9.7 MG/DL — SIGNIFICANT CHANGE UP (ref 8.4–10.5)
CHLORIDE SERPL-SCNC: 105 MMOL/L — SIGNIFICANT CHANGE UP (ref 96–108)
CO2 SERPL-SCNC: 25 MMOL/L — SIGNIFICANT CHANGE UP (ref 22–31)
COVID-19 SPIKE DOMAIN AB INTERP: POSITIVE
COVID-19 SPIKE DOMAIN ANTIBODY RESULT: >250 U/ML — HIGH
CREAT SERPL-MCNC: 0.88 MG/DL — SIGNIFICANT CHANGE UP (ref 0.5–1.3)
CULTURE RESULTS: SIGNIFICANT CHANGE UP
GLUCOSE SERPL-MCNC: 75 MG/DL — SIGNIFICANT CHANGE UP (ref 70–99)
HCT VFR BLD CALC: 40.6 % — SIGNIFICANT CHANGE UP (ref 34.5–45)
HGB BLD-MCNC: 13.1 G/DL — SIGNIFICANT CHANGE UP (ref 11.5–15.5)
MAGNESIUM SERPL-MCNC: 1.9 MG/DL — SIGNIFICANT CHANGE UP (ref 1.6–2.6)
MCHC RBC-ENTMCNC: 32.3 GM/DL — SIGNIFICANT CHANGE UP (ref 32–36)
MCHC RBC-ENTMCNC: 32.8 PG — SIGNIFICANT CHANGE UP (ref 27–34)
MCV RBC AUTO: 101.5 FL — HIGH (ref 80–100)
NRBC # BLD: 0 /100 WBCS — SIGNIFICANT CHANGE UP (ref 0–0)
PHOSPHATE SERPL-MCNC: 3.6 MG/DL — SIGNIFICANT CHANGE UP (ref 2.5–4.5)
PLATELET # BLD AUTO: 182 K/UL — SIGNIFICANT CHANGE UP (ref 150–400)
POTASSIUM SERPL-MCNC: 3.9 MMOL/L — SIGNIFICANT CHANGE UP (ref 3.5–5.3)
POTASSIUM SERPL-SCNC: 3.9 MMOL/L — SIGNIFICANT CHANGE UP (ref 3.5–5.3)
RBC # BLD: 4 M/UL — SIGNIFICANT CHANGE UP (ref 3.8–5.2)
RBC # FLD: 14.4 % — SIGNIFICANT CHANGE UP (ref 10.3–14.5)
SARS-COV-2 IGG+IGM SERPL QL IA: >250 U/ML — HIGH
SARS-COV-2 IGG+IGM SERPL QL IA: POSITIVE
SODIUM SERPL-SCNC: 142 MMOL/L — SIGNIFICANT CHANGE UP (ref 135–145)
SPECIMEN SOURCE: SIGNIFICANT CHANGE UP
WBC # BLD: 5.22 K/UL — SIGNIFICANT CHANGE UP (ref 3.8–10.5)
WBC # FLD AUTO: 5.22 K/UL — SIGNIFICANT CHANGE UP (ref 3.8–10.5)

## 2021-04-08 PROCEDURE — 72148 MRI LUMBAR SPINE W/O DYE: CPT | Mod: 26

## 2021-04-08 PROCEDURE — 99232 SBSQ HOSP IP/OBS MODERATE 35: CPT

## 2021-04-08 RX ADMIN — CELECOXIB 100 MILLIGRAM(S): 200 CAPSULE ORAL at 22:00

## 2021-04-08 RX ADMIN — TRAMADOL HYDROCHLORIDE 50 MILLIGRAM(S): 50 TABLET ORAL at 00:29

## 2021-04-08 RX ADMIN — CELECOXIB 100 MILLIGRAM(S): 200 CAPSULE ORAL at 05:04

## 2021-04-08 RX ADMIN — TRAMADOL HYDROCHLORIDE 50 MILLIGRAM(S): 50 TABLET ORAL at 12:12

## 2021-04-08 RX ADMIN — QUETIAPINE FUMARATE 25 MILLIGRAM(S): 200 TABLET, FILM COATED ORAL at 13:53

## 2021-04-08 RX ADMIN — Medication 10 MILLIGRAM(S): at 21:32

## 2021-04-08 RX ADMIN — CELECOXIB 100 MILLIGRAM(S): 200 CAPSULE ORAL at 05:45

## 2021-04-08 RX ADMIN — SENNA PLUS 2 TABLET(S): 8.6 TABLET ORAL at 21:30

## 2021-04-08 RX ADMIN — QUETIAPINE FUMARATE 25 MILLIGRAM(S): 200 TABLET, FILM COATED ORAL at 05:03

## 2021-04-08 RX ADMIN — PANTOPRAZOLE SODIUM 40 MILLIGRAM(S): 20 TABLET, DELAYED RELEASE ORAL at 06:07

## 2021-04-08 RX ADMIN — DONEPEZIL HYDROCHLORIDE 10 MILLIGRAM(S): 10 TABLET, FILM COATED ORAL at 21:30

## 2021-04-08 RX ADMIN — CELECOXIB 100 MILLIGRAM(S): 200 CAPSULE ORAL at 21:31

## 2021-04-08 RX ADMIN — ENOXAPARIN SODIUM 40 MILLIGRAM(S): 100 INJECTION SUBCUTANEOUS at 12:09

## 2021-04-08 RX ADMIN — POLYETHYLENE GLYCOL 3350 17 GRAM(S): 17 POWDER, FOR SOLUTION ORAL at 12:09

## 2021-04-08 RX ADMIN — LIDOCAINE 1 PATCH: 4 CREAM TOPICAL at 12:09

## 2021-04-08 RX ADMIN — TRAMADOL HYDROCHLORIDE 50 MILLIGRAM(S): 50 TABLET ORAL at 21:30

## 2021-04-08 RX ADMIN — QUETIAPINE FUMARATE 25 MILLIGRAM(S): 200 TABLET, FILM COATED ORAL at 21:31

## 2021-04-08 RX ADMIN — TRAMADOL HYDROCHLORIDE 50 MILLIGRAM(S): 50 TABLET ORAL at 12:45

## 2021-04-08 RX ADMIN — TRAMADOL HYDROCHLORIDE 50 MILLIGRAM(S): 50 TABLET ORAL at 22:00

## 2021-04-08 RX ADMIN — LOSARTAN POTASSIUM 50 MILLIGRAM(S): 100 TABLET, FILM COATED ORAL at 05:03

## 2021-04-08 RX ADMIN — Medication 60 MILLIGRAM(S): at 12:09

## 2021-04-08 NOTE — PHYSICAL THERAPY INITIAL EVALUATION ADULT - ADDITIONAL COMMENTS
Pt lives with 24/7 HHA in an apartment, +elevator access. Previously req assist with ADLs and ambulated with rollator. Pt lives alone in an apartment, +elevator access. Previously req assist with ADLs and ambulated with rollator. Has HHA from 9-5PM / 4x a week. DME: rollator

## 2021-04-08 NOTE — PROGRESS NOTE ADULT - PROBLEM SELECTOR PLAN 1
-L3-4 and L4-5 suspected impingement of both exiting nerve roots.  -L1-L2 suspected impingement of the exiting right L1 nerve root.  -lidocaine patch, celebrex, tramadol for 7-10 pain  -MR lumbrosacral spine to further evaluate, consult spine service on schedule for 4/9 at 5pm

## 2021-04-08 NOTE — OCCUPATIONAL THERAPY INITIAL EVALUATION ADULT - PERTINENT HX OF CURRENT PROBLEM, REHAB EVAL
85 year old female here for acute lower back pain found to have possible nerve impingment on CT scan causing radiculopathy. Problem: Acute low back pain, unspecified back pain laterality, unspecified whether sciatica present.  -L3-4 and L4-5 suspected impingement of both exiting nerve roots. -L1-L2 suspected impingement of the exiting right L1 nerve root.

## 2021-04-08 NOTE — PHYSICAL THERAPY INITIAL EVALUATION ADULT - PRECAUTIONS/LIMITATIONS, REHAB EVAL
There have not been any traumatic incidents but patient has been unstable and sometimes slips when she is getting out of bed or getting off of the toilet. She gets physical therapy 1-2 times a week but this week she has been unable to participate because of pain in the back. Denies numbness and tingling in the groin area or in the bilateral lower extremities, denies fevers or chills, no associated bladder or bowel incontinence but the patient does express that she is constipated and feels like she needs to move her bowels but nothing is coming out. She is able to move her lower extremities spontaneously. CXR 4/7: No focal infiltrate. Hiatal hernia versus cavitating mass. Consider two views of the chest or CT chest for further evaluation. CT Lumbar Spine 4/7: Bilateral pars defect L5-S1 with grade 1 to grade 2 anterolisthesis and suspected impingement of both exiting L5 nerve roots. L3-4 and L4-5 suspected impingement of both exiting nerve roots. L1-L2 suspected impingement of the exiting right L1 nerve root. Old appearing anterior wedge compression fracture defect at T12. Levoscoliosis with exaggerated lordotic curvature and marked degenerative changes throughout the lumbar spine. CT Chest 4/7: Large hiatal hernia. There have not been any traumatic incidents but patient has been unstable and sometimes slips when she is getting out of bed or getting off of the toilet. She gets physical therapy 1-2 times a week but this week she has been unable to participate because of pain in the back. Denies numbness and tingling in the groin area or in the bilateral lower extremities, denies fevers or chills, no associated bladder or bowel incontinence but the patient does express that she is constipated and feels like she needs to move her bowels but nothing is coming out. She is able to move her lower extremities spontaneously. CXR 4/7: No focal infiltrate. Hiatal hernia versus cavitating mass. Consider two views of the chest or CT chest for further evaluation. CT Lumbar Spine 4/7: Bilateral pars defect L5-S1 with grade 1 to grade 2 anterolisthesis and suspected impingement of both exiting L5 nerve roots. L3-4 and L4-5 suspected impingement of both exiting nerve roots. L1-L2 suspected impingement of the exiting right L1 nerve root. Old appearing anterior wedge compression fracture defect at T12. Levoscoliosis with exaggerated lordotic curvature and marked degenerative changes throughout the lumbar spine. CT Chest 4/7: Large hiatal hernia./fall precautions

## 2021-04-08 NOTE — OCCUPATIONAL THERAPY INITIAL EVALUATION ADULT - LIVES WITH, PROFILE
pt lives alone, with HHA 9-5, assist for ADLs but pt reports she will toilet herself independently when HHA is not present, reports ambulating independently with rollator.

## 2021-04-08 NOTE — OCCUPATIONAL THERAPY INITIAL EVALUATION ADULT - DIAGNOSIS, OT EVAL
Pt presents with impaired cognition, fear of falling, decreased mobility, impaired balance, impaired coordination and pain.

## 2021-04-08 NOTE — PROGRESS NOTE ADULT - ATTENDING COMMENTS
85F w/ PMHx of HTN, anxiety p/w acute low back pain over the last two weeks w/ radiation to RLE. She also reports increasing constipation over the last month. No bladder incontinence or bowel incontinence. At baseline she is able to ambulate with walker, now wheelchair dependent due to pain.     Exam is notable for severe point tenderness over midline lumbar spine and +straight leg raise. Strength 4/5 bilaterally, symmetric, pain limited.    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 No obv cord compression or cauda equina on CT. D/w spine and will obtain MRI. Formal consult pending MRI.     Standing NSAID for pain  Tylenol PRN  Tramadol if severe pain  She is already on SSRI. Defer gabapentin or pregabalin due to limited evidence of effectiveness and high risk of side effect in the elderly.   Per spine will obtain MRI before formal consult.  PT  Dispo pending MRI and adequate pain control. 85F w/ PMHx of HTN, anxiety p/w acute low back pain over the last two weeks w/ radiation to RLE. She also reports increasing constipation over the last month. No bladder incontinence or bowel incontinence. At baseline she is able to ambulate with walker, now wheelchair dependent due to pain.     Exam is notable for severe point tenderness over midline lumbar spine and +straight leg raise. Strength 4/5 bilaterally, symmetric, pain limited.    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 No obv cord compression or cauda equina on CT. D/w spine and will obtain MRI. Formal consult pending MRI.     Standing NSAID for pain, celcoxib given hiatal hernia  Tylenol-will make standing  Tramadol if severe pain  She is already on SSRI. Defer gabapentin or pregabalin due to limited evidence of effectiveness and high risk of side effect in the elderly.   Spine consult.  PT  Dispo pending spine eval and adequate pain control.

## 2021-04-08 NOTE — OCCUPATIONAL THERAPY INITIAL EVALUATION ADULT - MANUAL MUSCLE TESTING RESULTS, REHAB EVAL
at least 3+/5 sriram, pt cognition impairs her ability to formally assess MMT/grossly assessed due to

## 2021-04-08 NOTE — PHYSICAL THERAPY INITIAL EVALUATION ADULT - PERTINENT HX OF CURRENT PROBLEM, REHAB EVAL
85F w/ PMHx of hypertension, anxiety/depression, LBP complicated by a diagnosis of sciatica in the past p/w acute LBP and gait instability. Onset of pt's gait instability and back pain started 10 days ago per pt. The pain is located in the middle of her lower back (lumbar region) and radiates to her rectum. The pain is described as dull and gnawing per patient and is present pretty much throughout the day. The patient has taken tylenol at home with minimal relief. CONT'D BELOW:

## 2021-04-08 NOTE — PHYSICAL THERAPY INITIAL EVALUATION ADULT - DISCHARGE DISPOSITION, PT EVAL
Subacute Rehab. IF pt goes home, pt will require assist with ALL ADLs and functional mobility and resume Home PT services. DME: RW and 3:1 commode./rehabilitation facility

## 2021-04-08 NOTE — PROGRESS NOTE ADULT - SUBJECTIVE AND OBJECTIVE BOX
Patient is a 85y old  Female who presents with a chief complaint of Acute Lower Back Pain (2021 15:07)      SUBJECTIVE / OVERNIGHT EVENTS:  patient admitted to floors overnight. was able to have a bowel movement and says her pain is bearable. required tramadol overnight. denies any incontinence. denies nausea or vomiting, does say she has some mild abdominal discomfort in mid epigastrium.     MEDICATIONS  (STANDING):  bisacodyl Suppository 10 milliGRAM(s) Rectal daily  celecoxib 100 milliGRAM(s) Oral two times a day  donepezil 10 milliGRAM(s) Oral at bedtime  enoxaparin Injectable 40 milliGRAM(s) SubCutaneous daily  FLUoxetine 60 milliGRAM(s) Oral daily  losartan 50 milliGRAM(s) Oral daily  pantoprazole    Tablet 40 milliGRAM(s) Oral before breakfast  polyethylene glycol 3350 17 Gram(s) Oral daily  QUEtiapine 25 milliGRAM(s) Oral three times a day  senna 2 Tablet(s) Oral at bedtime    MEDICATIONS  (PRN):  acetaminophen   Tablet .. 650 milliGRAM(s) Oral every 6 hours PRN Mild Pain (1 - 3), Moderate Pain (4 - 6)  lidocaine   Patch 1 Patch Transdermal daily PRN back pain  traMADol 50 milliGRAM(s) Oral every 6 hours PRN Severe Pain (7 - 10)      CAPILLARY BLOOD GLUCOSE        I&O's Summary    2021 07:01  -  2021 07:00  --------------------------------------------------------  IN: 290 mL / OUT: 0 mL / NET: 290 mL        PHYSICAL EXAM:  Vital Signs Last 24 Hrs  T(C): 36.3 (21 @ 05:03)  T(F): 97.3 (21 @ 05:03), Max: 98.5 (21 @ 17:56)  HR: 52 (21 @ 05:03) (50 - 55)  BP: 154/83 (21 @ 05:03)  BP(mean): 97 (21 @ 16:11) (96 - 97)  RR: 18 (21 @ 05:03) (16 - 18)  SpO2: 99% (21 @ 05:03) (95% - 99%)  Wt(kg): --     @ 07:01  -   @ 07:00  --------------------------------------------------------  IN: 290 mL / OUT: 0 mL / NET: 290 mL      PHYSICAL EXAM:  GENERAL: NAD, well-developed  HEAD:  Atraumatic, Normocephalic  EYES: EOMI, PERRLA, conjunctiva and sclera clear  NECK: Supple, No JVD  CHEST/LUNG: Clear to auscultation bilaterally; No wheeze  HEART: bradycardic, no murmurs   ABDOMEN: soft and non distended but no tenderness to palpation   EXTREMITIES:, No clubbing, cyanosis, or edema  PSYCH: AAOx3  NEUROLOGY: non-focal  MSK: moving all 4 extremities spontaneously, straight leg raise test on right side with reproducible pain in the hamstring   SKIN: No rashes or lesions    Personally reviewed imaging, labs, EKG  LABS:                        13.1   5.22  )-----------( 182      ( 2021 07:17 )             40.6     04-08    142  |  105  |  11  ----------------------------<  75  3.9   |  25  |  0.88    Ca    9.7      2021 07:13  Phos  3.6     04-08  Mg     1.9     04-08    TPro  6.8  /  Alb  3.7  /  TBili  0.4  /  DBili  x   /  AST  26  /  ALT  16  /  AlkPhos  55  04-07    PT/INR - ( 2021 11:33 )   PT: 12.5 sec;   INR: 1.04 ratio         PTT - ( 2021 11:33 )  PTT:32.1 sec      Urinalysis Basic - ( 2021 14:00 )    Color: Light Yellow / Appearance: Clear / S.013 / pH: x  Gluc: x / Ketone: Negative  / Bili: Negative / Urobili: Negative   Blood: x / Protein: Negative / Nitrite: Negative   Leuk Esterase: Negative / RBC: 6 /hpf / WBC 1 /HPF   Sq Epi: x / Non Sq Epi: 1 /hpf / Bacteria: Negative

## 2021-04-08 NOTE — PROGRESS NOTE ADULT - PROBLEM SELECTOR PLAN 4
-large hiatal hernia seen on CT   -continue with pantoprazole, tolerating PO, patient likely needs to follow with GI as outpatient

## 2021-04-09 ENCOUNTER — TRANSCRIPTION ENCOUNTER (OUTPATIENT)
Age: 86
End: 2021-04-09

## 2021-04-09 PROCEDURE — 99232 SBSQ HOSP IP/OBS MODERATE 35: CPT

## 2021-04-09 RX ORDER — ONDANSETRON 8 MG/1
4 TABLET, FILM COATED ORAL ONCE
Refills: 0 | Status: COMPLETED | OUTPATIENT
Start: 2021-04-09 | End: 2021-04-09

## 2021-04-09 RX ADMIN — SENNA PLUS 2 TABLET(S): 8.6 TABLET ORAL at 21:51

## 2021-04-09 RX ADMIN — QUETIAPINE FUMARATE 25 MILLIGRAM(S): 200 TABLET, FILM COATED ORAL at 21:51

## 2021-04-09 RX ADMIN — QUETIAPINE FUMARATE 25 MILLIGRAM(S): 200 TABLET, FILM COATED ORAL at 14:38

## 2021-04-09 RX ADMIN — TRAMADOL HYDROCHLORIDE 50 MILLIGRAM(S): 50 TABLET ORAL at 12:38

## 2021-04-09 RX ADMIN — TRAMADOL HYDROCHLORIDE 50 MILLIGRAM(S): 50 TABLET ORAL at 11:25

## 2021-04-09 RX ADMIN — QUETIAPINE FUMARATE 25 MILLIGRAM(S): 200 TABLET, FILM COATED ORAL at 05:16

## 2021-04-09 RX ADMIN — CELECOXIB 100 MILLIGRAM(S): 200 CAPSULE ORAL at 06:00

## 2021-04-09 RX ADMIN — LOSARTAN POTASSIUM 50 MILLIGRAM(S): 100 TABLET, FILM COATED ORAL at 05:15

## 2021-04-09 RX ADMIN — DONEPEZIL HYDROCHLORIDE 10 MILLIGRAM(S): 10 TABLET, FILM COATED ORAL at 21:52

## 2021-04-09 RX ADMIN — Medication 650 MILLIGRAM(S): at 14:38

## 2021-04-09 RX ADMIN — LIDOCAINE 1 PATCH: 4 CREAM TOPICAL at 20:52

## 2021-04-09 RX ADMIN — Medication 650 MILLIGRAM(S): at 15:30

## 2021-04-09 RX ADMIN — PANTOPRAZOLE SODIUM 40 MILLIGRAM(S): 20 TABLET, DELAYED RELEASE ORAL at 05:16

## 2021-04-09 RX ADMIN — POLYETHYLENE GLYCOL 3350 17 GRAM(S): 17 POWDER, FOR SOLUTION ORAL at 11:25

## 2021-04-09 RX ADMIN — CELECOXIB 100 MILLIGRAM(S): 200 CAPSULE ORAL at 17:54

## 2021-04-09 RX ADMIN — Medication 60 MILLIGRAM(S): at 11:25

## 2021-04-09 RX ADMIN — Medication 10 MILLIGRAM(S): at 11:26

## 2021-04-09 RX ADMIN — LIDOCAINE 1 PATCH: 4 CREAM TOPICAL at 11:25

## 2021-04-09 RX ADMIN — CELECOXIB 100 MILLIGRAM(S): 200 CAPSULE ORAL at 05:15

## 2021-04-09 RX ADMIN — LIDOCAINE 1 PATCH: 4 CREAM TOPICAL at 01:00

## 2021-04-09 RX ADMIN — ENOXAPARIN SODIUM 40 MILLIGRAM(S): 100 INJECTION SUBCUTANEOUS at 11:25

## 2021-04-09 RX ADMIN — ONDANSETRON 4 MILLIGRAM(S): 8 TABLET, FILM COATED ORAL at 11:25

## 2021-04-09 NOTE — CONSULT NOTE ADULT - ASSESSMENT
LENNOX JENNINGS    86YO F PMHx HTN, anx/depression w/ chronic hx of LBP brought in by daughter for gait instability 2/2 worsening LBP w/ BLE radic. (walks at home with walker and has nurse aid). MRI L spine shows multilevel spondylosis/arthritic degen, b/l pars defects at L5/S1 w/ anterolistheisis, chronic compression fx at T12. Exam: AOx2-3, non focal MAGALLANES ag strong.   - no acute neurosurgical intervention, agreed with daughter risks of spinal surgery outweigh any potential benefits   - may benefit from an LSO brace at all times when OOB given likely microinstability at L5/S1   - Fu outpatient w/ private pain mgmt as planned  - consider gabapentin TID and flexeril PRN for muscle spasm and neuropathic pain  - Can fu w/ Dr. Ann as needed, no planned surgical intervention.     - Please obtain an LSO brace. Please contact piyush pdailla for a fitted brace: [phone]

## 2021-04-09 NOTE — PROGRESS NOTE ADULT - PROBLEM SELECTOR PLAN 4
-large hiatal hernia seen on CT   -continue with pantoprazole, tolerating PO, patient likely needs to follow with GI as outpatient -large hiatal hernia seen on CT   -continue with pantoprazole, tolerating PO, patient likely needs to follow with GI as outpatient  -zofran PRN for nausea

## 2021-04-09 NOTE — PROGRESS NOTE ADULT - PROBLEM SELECTOR PLAN 1
-L3-4 and L4-5 suspected impingement of both exiting nerve roots.  -L1-L2 suspected impingement of the exiting right L1 nerve root.  -lidocaine patch, celebrex, tramadol for 7-10 pain  -MR lumbrosacral spine to further evaluate, consult spine service on schedule for 4/9 at 5pm -L3-4 and L4-5 suspected impingement of both exiting nerve roots.  -L1-L2 suspected impingement of the exiting right L1 nerve root.  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 spine service consulted today 4/9/21

## 2021-04-09 NOTE — DISCHARGE NOTE PROVIDER - HOSPITAL COURSE
86YO F PMHx HTN, anx/depression w/ chronic hx of LBP brought in by daughter for gait instability 2/2 worsening LBP w/ BLE radic. (walks at home with walker and has nurse aid). MRI L spine shows multilevel spondylosis/arthritic degen, b/l pars defects at L5/S1 w/ anterolistheisis, chronic compression fx at T12. Patient was also found to have large hiatal hernia on CT scan, although this was not causing any GI issues. Patient given celebrex and tramadol for pain control, along with lidocaine patch. She was seen and evaluated by physical therapy who recommended a rehab facility. She was evaluated by Neurosurgery who recommended a LSO brace, no acute surgery. She was fitted for a brace. She remained hemodynamically stable throughout hospital admission. She is medically stable for discharge. 86YO F PMHx HTN, anx/depression w/ chronic hx of LBP brought in by daughter for gait instability 2/2 worsening LBP w/ BLE radic. (walks at home with walker and has nurse aid). MRI L spine shows multilevel spondylosis/arthritic degen, b/l pars defects at L5/S1 w/ anterolistheisis, chronic compression fx at T12. Patient was also found to have large hiatal hernia on CT scan, although this was not causing any GI issues. Patient given celebrex and tramadol for pain control, along with lidocaine patch. She was seen and evaluated by physical therapy who recommended a rehab facility. She was evaluated by Neurosurgery who recommended a LSO brace, no acute surgery. She was fitted for a brace. She remained hemodynamically stable throughout hospital admission. She is medically stable for discharge with a short course of anti-inflammatory medication and pain medication. 86YO F PMHx HTN, anx/depression w/ chronic hx of LBP brought in by daughter for gait instability 2/2 worsening LBP w/ BLE radic. (walks at home with walker and has nurse aid). MRI L spine shows multilevel spondylosis/arthritic degen, b/l pars defects at L5/S1 w/ anterolistheisis, chronic compression fx at T12. Patient was also found to have large hiatal hernia on CT scan, although this was not causing any GI issues. Patient given celebrex and tramadol for pain control, along with lidocaine patch. She was seen and evaluated by physical therapy who recommended a rehab facility. She was evaluated by Neurosurgery who recommended a LSO brace, no acute surgery. She was fitted for a brace. She had an episode of elevated heart rate with concern for atrial fibrillation on a day she was supposed to be discharged, was given a dose of metoprolol and which subsequently resolved. Patient did not have further episodes of atrial fibrillation. She is medically stable for discharge with a short course of anti-inflammatory medication and pain medication. 84YO F PMHx HTN, anx/depression w/ chronic hx of LBP brought in by daughter for gait instability 2/2 worsening LBP w/ BLE radic. (walks at home with walker and has nurse aid). MRI L spine shows multilevel spondylosis/arthritic degen, b/l pars defects at L5/S1 w/ anterolistheisis, chronic compression fx at T12. Patient was also found to have large hiatal hernia on CT scan, although this was not causing any GI issues. Patient given celebrex and tramadol for pain control, along with lidocaine patch. She was seen and evaluated by physical therapy who recommended a rehab facility. She was evaluated by Neurosurgery who recommended a LSO brace, no acute surgery. She was fitted for a brace. She had an episode of elevated heart rate with concern for atrial fibrillation with ectopic beats including premature aberrantly conducted complexes and ectopic beats on a day she was supposed to be discharged, was given a dose of metoprolol and which subsequently resolved. Patient did not have further episodes of atrial fibrillation. She is medically stable for discharge with a short course of anti-inflammatory medication and pain medication. 84YO F PMHx HTN, anx/depression w/ chronic hx of LBP brought in by daughter for gait instability 2/2 worsening LBP w/ BLE radic. (walks at home with walker and has nurse aid). MRI L spine shows multilevel spondylosis/arthritic degen, b/l pars defects at L5/S1 w/ anterolistheisis, chronic compression fx at T12. Patient was also found to have large hiatal hernia on CT scan, although this was not causing any GI issues. Patient given celebrex and tramadol for pain control, along with lidocaine patch. She was seen and evaluated by physical therapy who recommended a rehab facility. She was evaluated by Neurosurgery who recommended a LSO brace, no acute surgery. She was fitted for a brace. She had an episode of elevated heart rate with concern for atrial fibrillation with ectopic beats vs sinus with  very frequent pacs/pvcs including premature aberrantly conducted complexes and ectopic beats on a day she was supposed to be discharged, was given a dose of metoprolol and which subsequently resolved. Patient did not have further episodes of atrial fibrillation. She is medically stable for discharge with a short course of anti-inflammatory medication and pain medication. She will require close cardiology follow up for further workup and treatment.

## 2021-04-09 NOTE — CONSULT NOTE ADULT - SUBJECTIVE AND OBJECTIVE BOX
p ([id number])     HPI:  85 year old female with pmhx of hypertension, anxiety/depression, lower back pain complicated by a diagnosis of sciatica in the past who presents to the hospital today after being dropped off by her daughter for acute lower back pain and gait instability. Onset of patient's gait instability and back pain started 10 days ago per patient. The pain is located in the middle of her lower back (lumbar region) and radiates to her rectum. The pain is described as dull and gnawing per patient and is present pretty much throughout the day. The patient has taken tylenol at home with minimal relief. There have not been any traumatic incidents but patient has been unstable and sometimes slips when she is getting out of bed or getting off of the toilet. She gets physical therapy 1-2 times a week but this week she has been unable to participate because of pain in the back. Denies numbness and tingling in the groin area or in the bilateral lower extremities, denies fevers or chills, no associated bladder or bowel incontinence but the patient does express that she is constipated and feels like she needs to move her bowels but nothing is coming out. She is able to move her lower extremities spontaneously.     Imaging:  Multilevel spondylosis with varying levels of spinal canal stenosis and neural foraminal stenosis.  Bilateral pars defects at L6-S1 with grade 1-2 anterolisthesis.  Chronic compression fracture suspected at the T12.  Exam: AOx2-3, non focal MAGALLANES ag strong.     --Anticoagulation:  enoxaparin Injectable 40 milliGRAM(s) SubCutaneous daily    =====================  PAST MEDICAL HISTORY   Essential hypertension    Alcohol abuse    Depression, unspecified depression type    Iron deficiency anemia, unspecified iron deficiency anemia type      PAST SURGICAL HISTORY   History of knee replacement, unspecified laterality          MEDICATIONS:  Antibiotics:    Neuro:  acetaminophen   Tablet .. 650 milliGRAM(s) Oral every 6 hours PRN  celecoxib 100 milliGRAM(s) Oral two times a day  donepezil 10 milliGRAM(s) Oral at bedtime  FLUoxetine 60 milliGRAM(s) Oral daily  QUEtiapine 25 milliGRAM(s) Oral three times a day  traMADol 50 milliGRAM(s) Oral every 6 hours PRN    Other:  bisacodyl Suppository 10 milliGRAM(s) Rectal daily  losartan 50 milliGRAM(s) Oral daily  pantoprazole    Tablet 40 milliGRAM(s) Oral before breakfast  polyethylene glycol 3350 17 Gram(s) Oral daily  senna 2 Tablet(s) Oral at bedtime      SOCIAL HISTORY:   Occupation:   Marital Status:     FAMILY HISTORY:      ROS: Negative except per HPI    LABS:                          13.1   5.22  )-----------( 182      ( 08 Apr 2021 07:17 )             40.6     04-08    142  |  105  |  11  ----------------------------<  75  3.9   |  25  |  0.88    Ca    9.7      08 Apr 2021 07:13  Phos  3.6     04-08  Mg     1.9     04-08

## 2021-04-09 NOTE — DISCHARGE NOTE PROVIDER - NSDCMRMEDTOKEN_GEN_ALL_CORE_FT
donepezil 10 mg oral tablet: 1 tab(s) orally once a day (at bedtime)  FLUoxetine 60 mg oral tablet: 1 tab(s) orally once a day (in the morning)  losartan 50 mg oral tablet: 1 tab(s) orally once a day  LSO Brace:   pantoprazole 40 mg oral delayed release tablet: 1 tab(s) orally once a day  QUEtiapine 25 mg oral tablet: 1 tab(s) orally 3 times a day   3:1 commode:   donepezil 10 mg oral tablet: 1 tab(s) orally once a day (at bedtime)  FLUoxetine 60 mg oral tablet: 1 tab(s) orally once a day (in the morning)  losartan 50 mg oral tablet: 1 tab(s) orally once a day  LSO Brace:   pantoprazole 40 mg oral delayed release tablet: 1 tab(s) orally once a day  QUEtiapine 25 mg oral tablet: 1 tab(s) orally 3 times a day  Rolling Walker: ICD 10 Code: S22.080A  Transport Wheelchair: ICD 10: S22.080A   3:1 commode:   celecoxib 100 mg oral capsule: 1 cap(s) orally 2 times a day for 5 days  donepezil 10 mg oral tablet: 1 tab(s) orally once a day (at bedtime)  FLUoxetine 60 mg oral tablet: 1 tab(s) orally once a day (in the morning)  losartan 50 mg oral tablet: 1 tab(s) orally once a day  LSO Brace:   pantoprazole 40 mg oral delayed release tablet: 1 tab(s) orally once a day (before a meal)  QUEtiapine 25 mg oral tablet: 1 tab(s) orally 3 times a day  Rolling Walker: ICD 10 Code: S22.080A  traMADol 50 mg oral tablet: 1 tab(s) orally every 6 hours, As needed, Severe Pain (7 - 10) for 5 days MDD:4 tabs  Transport Wheelchair: ICD 10: S22.080A

## 2021-04-09 NOTE — PROGRESS NOTE ADULT - ATTENDING COMMENTS
85F w/ PMHx of HTN, anxiety p/w acute low back pain over the last two weeks w/ radiation to RLE. She also reports increasing constipation over the last month. No bladder incontinence or bowel incontinence. At baseline she is able to ambulate with walker, now wheelchair dependent due to pain.     Exam is notable for severe point tenderness over midline lumbar spine and +straight leg raise. Strength 4/5 bilaterally, symmetric, pain limited.    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 No obv cord compression or cauda equina on CT. D/w spine and will obtain MRI. Formal consult pending MRI.     Standing NSAID for pain, celcoxib given hiatal hernia  Tylenol-will make standing  Tramadol if severe pain  She is already on SSRI. Defer gabapentin or pregabalin due to limited evidence of effectiveness and high risk of side effect in the elderly.   Spine consult.  PT  Dispo pending spine eval and adequate pain control.

## 2021-04-09 NOTE — DISCHARGE NOTE PROVIDER - CARE PROVIDER_API CALL
Stephan Boss)  Geriatric Medicine; Hospice\[facility]\""liative Medicine; Internal Medicine  [address], [address]  [place] [address]  Phone: [phone]  Fax: [phone]  Established Patient  Follow Up Time: 1 week    Yuki Ann)  Neurosurgery  [address], [address] [place] [address]  Phone: [phone]  Fax: [phone]  Follow Up Time: Routine

## 2021-04-09 NOTE — DISCHARGE NOTE PROVIDER - NSFOLLOWUPCLINICS_GEN_ALL_ED_FT
A Cardiologist  Cardiology  .  [place]   Phone:   Fax:   Follow Up Time: Routine     A Cardiologist  Cardiology  .  [place]   Phone:   Fax:   Follow Up Time: Routine    [facility]  Cardiology  [address] [place] [address]  Phone: [phone]  Fax:

## 2021-04-09 NOTE — DISCHARGE NOTE PROVIDER - CARE PROVIDERS DIRECT ADDRESSES
[email or web address]

## 2021-04-09 NOTE — DISCHARGE NOTE PROVIDER - NSDCCPCAREPLAN_GEN_ALL_CORE_FT
PRINCIPAL DISCHARGE DIAGNOSIS  Diagnosis: Acute low back pain, unspecified back pain laterality, unspecified whether sciatica present  Assessment and Plan of Treatment: You came to the hospital with back pain. 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 This is the cause of your back pain. You were given medication and a brace for the back pain. Continue to take your medication and use your brace as prescribed. It will be important to do physical therapy at your rehab facility. Return to the hospital if you experience any sensation of numbness or tingling in your back or legs and the inability to walk, as well as bladder or bowel incontinence. Follow up with your primary physician and appointment for pain management doctor.      SECONDARY DISCHARGE DIAGNOSES  Diagnosis: Gait instability  Assessment and Plan of Treatment: As above.     PRINCIPAL DISCHARGE DIAGNOSIS  Diagnosis: Acute low back pain, unspecified back pain laterality, unspecified whether sciatica present  Assessment and Plan of Treatment: You came to the hospital with back pain. 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 This is the cause of your back pain. You were given medication and a brace for the back pain. Continue to take your medication and use your brace as prescribed. It will be important to do physical therapy at your rehab facility. Return to the hospital if you experience any sensation of numbness or tingling in your back or legs and the inability to walk, as well as bladder or bowel incontinence. Follow up with your primary physician and appointment for pain management doctor.      SECONDARY DISCHARGE DIAGNOSES  Diagnosis: Gait instability  Assessment and Plan of Treatment: As above.    Diagnosis: Paroxysmal atrial fibrillation  Assessment and Plan of Treatment: You had an episode of atrial fibrillation while in the hospital. This delayed your discharge. It resolved after you were given a medication. It will be important to follow up with your primary care doctor and a Cardiologist as an outpatient for follow up for this abdonrmal heart rhthym.     PRINCIPAL DISCHARGE DIAGNOSIS  Diagnosis: Acute low back pain, unspecified back pain laterality, unspecified whether sciatica present  Assessment and Plan of Treatment: You came to the hospital with back pain. 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 This is the cause of your back pain. You were given medication and a brace for the back pain. Continue to take your medication and use your brace as prescribed. It will be important to do physical therapy at your rehab facility. Return to the hospital if you experience any sensation of numbness or tingling in your back or legs and the inability to walk, as well as bladder or bowel incontinence. Follow up with your primary physician and appointment for pain management doctor.      SECONDARY DISCHARGE DIAGNOSES  Diagnosis: Gait instability  Assessment and Plan of Treatment: As above.    Diagnosis: Paroxysmal atrial fibrillation  Assessment and Plan of Treatment: You had an episode of atrial fibrillation vs extra beats called premature atrial contractions while in the hospital. This delayed your discharge. It resolved after you were given a dose of a  medication called metoprolol, at which point your heart rate normalized. While generally benign, it will be important to follow up with your primary care doctor and a Cardiologist as an outpatient for follow up for this abdonrmal heart rhthym for further testing and treatment

## 2021-04-09 NOTE — DISCHARGE NOTE PROVIDER - NSDCFUADDAPPT_GEN_ALL_CORE_FT
Go to your Primary Care doctor to get a referral to a Cardiologist to follow up for an episode of Atrial Fibrillation that you had in the hospital.  Go to your Primary Care doctor to get a referral to a Cardiologist to follow up for an episode of Atrial Fibrillation and premature ectopic beats that you had in the hospital.

## 2021-04-09 NOTE — DISCHARGE NOTE PROVIDER - NSDCFUADDINST_GEN_ALL_CORE_FT
follow up with your PCP within a week of discharge.     schedule an appointment with the neurosurgeon as needed.  follow up with your PCP within a week of discharge for further pain management options.      schedule an appointment with the neurosurgeon as needed.

## 2021-04-09 NOTE — DISCHARGE NOTE PROVIDER - PROVIDER TOKENS
PROVIDER:[TOKEN:[[id number]],FOLLOWUP:[1 week],ESTABLISHEDPATIENT:[T]],PROVIDER:[TOKEN:[[id number]],FOLLOWUP:[Routine]]

## 2021-04-09 NOTE — PROGRESS NOTE ADULT - SUBJECTIVE AND OBJECTIVE BOX
Patient is a 85y old  Female who presents with a chief complaint of Acute Lower Back Pain (2021 08:32)      SUBJECTIVE / OVERNIGHT EVENTS:  pt requiring tramadol overnight   mri was done overnight  stil;l has back pain but is moving extrmeities     MEDICATIONS  (STANDING):  bisacodyl Suppository 10 milliGRAM(s) Rectal daily  celecoxib 100 milliGRAM(s) Oral two times a day  donepezil 10 milliGRAM(s) Oral at bedtime  enoxaparin Injectable 40 milliGRAM(s) SubCutaneous daily  FLUoxetine 60 milliGRAM(s) Oral daily  losartan 50 milliGRAM(s) Oral daily  pantoprazole    Tablet 40 milliGRAM(s) Oral before breakfast  polyethylene glycol 3350 17 Gram(s) Oral daily  QUEtiapine 25 milliGRAM(s) Oral three times a day  senna 2 Tablet(s) Oral at bedtime    MEDICATIONS  (PRN):  acetaminophen   Tablet .. 650 milliGRAM(s) Oral every 6 hours PRN Mild Pain (1 - 3), Moderate Pain (4 - 6)  lidocaine   Patch 1 Patch Transdermal daily PRN back pain  traMADol 50 milliGRAM(s) Oral every 6 hours PRN Severe Pain (7 - 10)      CAPILLARY BLOOD GLUCOSE        I&O's Summary    2021 07:01  -  2021 07:00  --------------------------------------------------------  IN: 580 mL / OUT: 1000 mL / NET: -420 mL        PHYSICAL EXAM:  Vital Signs Last 24 Hrs  T(C): 36.7 (21 @ 04:30)  T(F): 98 (21 @ 04:30), Max: 98.2 (21 @ 12:17)  HR: 58 (21 @ 04:30) (49 - 83)  BP: 122/77 (21 @ 04:30)  BP(mean): --  RR: 18 (21 @ 04:30) (18 - 18)  SpO2: 98% (21 @ 04:30) (94% - 98%)  Wt(kg): --     @ 07:01  -   @ 07:00  --------------------------------------------------------  IN: 580 mL / OUT: 1000 mL / NET: -420 mL      Constitutional: NAD, awake and alert  EYES: EOMI  ENT:  Normal Hearing, no tonsillar exudates   Neck: Soft and supple , no thyromegaly   Respiratory: Breath sounds are clear bilaterally, No wheezing, rales or rhonchi  Cardiovascular: S1 and S2, regular rate and rhythm, no Murmurs, gallops or rubs, no JVD,    Gastrointestinal: Bowel Sounds present, soft, nontender, nondistended, no guarding, no rebound  Extremities: No cyanosis or clubbing; warm to touch  Vascular: 2+ peripheral pulses lower ex  Neurological: No focal deficits, CN II-XII intact bilaterally, sensation to light touch intact in all extremities, gait intact. Pupils are equally reactive to light and symmetrical in size.   Musculoskeletal: 5/5 strength b/l upper and lower extremities; no joint swelling.  Skin: No rashes  Psych: no depression or anhedonia, AAOx3  HEME: no bruises, no nose bleeds      Personally reviewed imaging, labs, EKG  LABS:                        13.1   5.  )-----------( 182      ( 2021 07:17 )             40.6     04-08    142  |  105  |  11  ----------------------------<  75  3.9   |  25  |  0.88    Ca    9.7      2021 07:13  Phos  3.6     04-08  Mg     1.9     04-08    TPro  6.8  /  Alb  3.7  /  TBili  0.4  /  DBili  x   /  AST  26  /  ALT  16  /  AlkPhos  55  04-07    PT/INR - ( 2021 11:33 )   PT: 12.5 sec;   INR: 1.04 ratio         PTT - ( 2021 11:33 )  PTT:32.1 sec      Urinalysis Basic - ( 2021 14:00 )    Color: Light Yellow / Appearance: Clear / S.013 / pH: x  Gluc: x / Ketone: Negative  / Bili: Negative / Urobili: Negative   Blood: x / Protein: Negative / Nitrite: Negative   Leuk Esterase: Negative / RBC: 6 /hpf / WBC 1 /HPF   Sq Epi: x / Non Sq Epi: 1 /hpf / Bacteria: Negative        RADIOLOGY & ADDITIONAL TESTS:    Imaging Personally Reviewed:    Consultant(s) Notes Reviewed:      Care Discussed with Consultants/Other Providers:

## 2021-04-10 PROCEDURE — 99233 SBSQ HOSP IP/OBS HIGH 50: CPT

## 2021-04-10 RX ADMIN — SENNA PLUS 2 TABLET(S): 8.6 TABLET ORAL at 21:34

## 2021-04-10 RX ADMIN — ENOXAPARIN SODIUM 40 MILLIGRAM(S): 100 INJECTION SUBCUTANEOUS at 11:37

## 2021-04-10 RX ADMIN — LIDOCAINE 1 PATCH: 4 CREAM TOPICAL at 21:36

## 2021-04-10 RX ADMIN — PANTOPRAZOLE SODIUM 40 MILLIGRAM(S): 20 TABLET, DELAYED RELEASE ORAL at 06:18

## 2021-04-10 RX ADMIN — POLYETHYLENE GLYCOL 3350 17 GRAM(S): 17 POWDER, FOR SOLUTION ORAL at 09:52

## 2021-04-10 RX ADMIN — CELECOXIB 100 MILLIGRAM(S): 200 CAPSULE ORAL at 18:56

## 2021-04-10 RX ADMIN — DONEPEZIL HYDROCHLORIDE 10 MILLIGRAM(S): 10 TABLET, FILM COATED ORAL at 21:35

## 2021-04-10 RX ADMIN — LIDOCAINE 1 PATCH: 4 CREAM TOPICAL at 19:10

## 2021-04-10 RX ADMIN — CELECOXIB 100 MILLIGRAM(S): 200 CAPSULE ORAL at 17:13

## 2021-04-10 RX ADMIN — QUETIAPINE FUMARATE 25 MILLIGRAM(S): 200 TABLET, FILM COATED ORAL at 06:18

## 2021-04-10 RX ADMIN — LIDOCAINE 1 PATCH: 4 CREAM TOPICAL at 09:57

## 2021-04-10 RX ADMIN — CELECOXIB 100 MILLIGRAM(S): 200 CAPSULE ORAL at 06:18

## 2021-04-10 RX ADMIN — QUETIAPINE FUMARATE 25 MILLIGRAM(S): 200 TABLET, FILM COATED ORAL at 16:06

## 2021-04-10 RX ADMIN — Medication 10 MILLIGRAM(S): at 09:52

## 2021-04-10 RX ADMIN — Medication 60 MILLIGRAM(S): at 11:37

## 2021-04-10 RX ADMIN — LOSARTAN POTASSIUM 50 MILLIGRAM(S): 100 TABLET, FILM COATED ORAL at 06:19

## 2021-04-10 RX ADMIN — QUETIAPINE FUMARATE 25 MILLIGRAM(S): 200 TABLET, FILM COATED ORAL at 21:35

## 2021-04-10 RX ADMIN — Medication 650 MILLIGRAM(S): at 11:37

## 2021-04-10 NOTE — PROGRESS NOTE ADULT - SUBJECTIVE AND OBJECTIVE BOX
PROGRESS NOTE:   Authoted by Dr. Long Coffey MD  Pager [phone] [facility], [id number] [facility]     Patient is a 85y old  Female who presents with a chief complaint of Acute Lower Back Pain (09 Apr 2021 17:24)      SUBJECTIVE / OVERNIGHT EVENTS: The patient was seen and examined at bedside.     REVIEW OF SYSTEMS:    CONSTITUTIONAL: No weakness, fevers or chills  EYES/ENT: No visual changes;  No vertigo or throat pain   NECK: No pain or stiffness  RESPIRATORY: No cough, wheezing, hemoptysis; No shortness of breath  CARDIOVASCULAR: No chest pain or palpitations  GASTROINTESTINAL: No abdominal or epigastric pain. No nausea, vomiting, or hematemesis; No diarrhea or constipation. No melena or hematochezia.  GENITOURINARY: No dysuria, frequency or hematuria  NEUROLOGICAL: No numbness or weakness  SKIN: No itching, rashes    MEDICATIONS  (STANDING):  bisacodyl Suppository 10 milliGRAM(s) Rectal daily  celecoxib 100 milliGRAM(s) Oral two times a day  donepezil 10 milliGRAM(s) Oral at bedtime  enoxaparin Injectable 40 milliGRAM(s) SubCutaneous daily  FLUoxetine 60 milliGRAM(s) Oral daily  losartan 50 milliGRAM(s) Oral daily  pantoprazole    Tablet 40 milliGRAM(s) Oral before breakfast  polyethylene glycol 3350 17 Gram(s) Oral daily  QUEtiapine 25 milliGRAM(s) Oral three times a day  senna 2 Tablet(s) Oral at bedtime    MEDICATIONS  (PRN):  acetaminophen   Tablet .. 650 milliGRAM(s) Oral every 6 hours PRN Mild Pain (1 - 3), Moderate Pain (4 - 6)  lidocaine   Patch 1 Patch Transdermal daily PRN back pain  traMADol 50 milliGRAM(s) Oral every 6 hours PRN Severe Pain (7 - 10)      CAPILLARY BLOOD GLUCOSE        I&O's Summary    09 Apr 2021 07:01  -  10 Apr 2021 07:00  --------------------------------------------------------  IN: 600 mL / OUT: 1625 mL / NET: -1025 mL        PHYSICAL EXAM:  Vital Signs Last 24 Hrs  T(C): 36.5 (10 Apr 2021 04:53), Max: 36.9 (09 Apr 2021 12:19)  T(F): 97.7 (10 Apr 2021 04:53), Max: 98.5 (09 Apr 2021 12:19)  HR: 53 (10 Apr 2021 04:53) (42 - 53)  BP: 139/78 (10 Apr 2021 04:53) (139/78 - 157/84)  BP(mean): --  RR: 18 (10 Apr 2021 04:53) (18 - 18)  SpO2: 98% (10 Apr 2021 04:53) (96% - 98%)    CONSTITUTIONAL: NAD, well-developed  RESPIRATORY: Normal respiratory effort; lungs are clear to auscultation bilaterally  CARDIOVASCULAR: Regular rate and rhythm, normal S1 and S2, no murmur/rub/gallop; No lower extremity edema; Peripheral pulses are 2+ bilaterally  ABDOMEN: Nontender to palpation, normoactive bowel sounds, no rebound/guarding; No hepatosplenomegaly  MUSCLOSKELETAL: no clubbing or cyanosis of digits; no joint swelling or tenderness to palpation  PSYCH: A+O to person, place, and time; affect appropriate    LABS:                    Culture - Urine (collected 07 Apr 2021 17:14)  Source: .Urine Clean Catch (Midstream)  Final Report (08 Apr 2021 16:21):    <10,000 CFU/mL Normal Urogenital Bobbi        RADIOLOGY & ADDITIONAL TESTS:  Results Reviewed:   Imaging Personally Reviewed:  Electrocardiogram Personally Reviewed:    COORDINATION OF CARE:  Care Discussed with Consultants/Other Providers [Y/N]:  Prior or Outpatient Records Reviewed [Y/N]:   PROGRESS NOTE:   Authoted by Dr. Long Coffey MD  Pager [phone] [facility], [id number] [facility]     Patient is a 85y old  Female who presents with a chief complaint of Acute Lower Back Pain (09 Apr 2021 17:24)      SUBJECTIVE / OVERNIGHT EVENTS: The patient was seen and examined at bedside. No acute events overnight. the patient says she feels like she has to have a bowel movement but has been unable to. She feels like she has to strain. She would like ot try a suppository She does not want an enema at this time.     REVIEW OF SYSTEMS:    CONSTITUTIONAL: No weakness, fevers or chills  EYES/ENT: No visual changes;  No vertigo or throat pain   NECK: No pain or stiffness  RESPIRATORY: No cough, wheezing, hemoptysis; No shortness of breath  CARDIOVASCULAR: No chest pain or palpitations  GASTROINTESTINAL: No abdominal or epigastric pain. No nausea, vomiting, or hematemesis; + constipation. No melena or hematochezia.  GENITOURINARY: No dysuria, frequency or hematuria  NEUROLOGICAL: No numbness or weakness  SKIN: No itching, rashes    MEDICATIONS  (STANDING):  bisacodyl Suppository 10 milliGRAM(s) Rectal daily  celecoxib 100 milliGRAM(s) Oral two times a day  donepezil 10 milliGRAM(s) Oral at bedtime  enoxaparin Injectable 40 milliGRAM(s) SubCutaneous daily  FLUoxetine 60 milliGRAM(s) Oral daily  losartan 50 milliGRAM(s) Oral daily  pantoprazole    Tablet 40 milliGRAM(s) Oral before breakfast  polyethylene glycol 3350 17 Gram(s) Oral daily  QUEtiapine 25 milliGRAM(s) Oral three times a day  senna 2 Tablet(s) Oral at bedtime    MEDICATIONS  (PRN):  acetaminophen   Tablet .. 650 milliGRAM(s) Oral every 6 hours PRN Mild Pain (1 - 3), Moderate Pain (4 - 6)  lidocaine   Patch 1 Patch Transdermal daily PRN back pain  traMADol 50 milliGRAM(s) Oral every 6 hours PRN Severe Pain (7 - 10)      CAPILLARY BLOOD GLUCOSE        I&O's Summary    09 Apr 2021 07:01  -  10 Apr 2021 07:00  --------------------------------------------------------  IN: 600 mL / OUT: 1625 mL / NET: -1025 mL        PHYSICAL EXAM:  Vital Signs Last 24 Hrs  T(C): 36.5 (10 Apr 2021 04:53), Max: 36.9 (09 Apr 2021 12:19)  T(F): 97.7 (10 Apr 2021 04:53), Max: 98.5 (09 Apr 2021 12:19)  HR: 53 (10 Apr 2021 04:53) (42 - 53)  BP: 139/78 (10 Apr 2021 04:53) (139/78 - 157/84)  BP(mean): --  RR: 18 (10 Apr 2021 04:53) (18 - 18)  SpO2: 98% (10 Apr 2021 04:53) (96% - 98%)    CONSTITUTIONAL: NAD, well-developed  RESPIRATORY: Normal respiratory effort; lungs are clear to auscultation bilaterally  CARDIOVASCULAR: Regular rate and rhythm, normal S1 and S2, no murmur/rub/gallop; No lower extremity edema; Peripheral pulses are 2+ bilaterally  ABDOMEN: Nontender to palpation, normoactive bowel sounds, no rebound/guarding; No hepatosplenomegaly  MUSCLOSKELETAL: no clubbing or cyanosis of digits; no joint swelling or tenderness to palpation  PSYCH: A+O to person, place, and time; affect appropriate    LABS:    Culture - Urine (collected 07 Apr 2021 17:14)  Source: .Urine Clean Catch (Midstream)  Final Report (08 Apr 2021 16:21):    <10,000 CFU/mL Normal Urogenital Bobbi        RADIOLOGY & ADDITIONAL TESTS:  Results Reviewed:   Imaging Personally Reviewed:  Electrocardiogram Personally Reviewed:    COORDINATION OF CARE:  Care Discussed with Consultants/Other Providers [Y/N]: Care discussed with case management.   Prior or Outpatient Records Reviewed [Y/N]:

## 2021-04-10 NOTE — PROGRESS NOTE ADULT - PROBLEM SELECTOR PLAN 1
-L3-4 and L4-5 suspected impingement of both exiting nerve roots.  -L1-L2 suspected impingement of the exiting right L1 nerve root.  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 spine service consulted today 4/9/21

## 2021-04-10 NOTE — PROGRESS NOTE ADULT - PROBLEM SELECTOR PLAN 4
-large hiatal hernia seen on CT   -continue with pantoprazole, tolerating PO, patient likely needs to follow with GI as outpatient  -zofran PRN for nausea

## 2021-04-10 NOTE — CHART NOTE - NSCHARTNOTEFT_GEN_A_CORE
Patient Brisa Garner was dispensed a LSO with rigid anterior posterior and lateral panels. Brisa was educated on the care use and function of the orthosis. I contacted the patients daughter at the patients request to explain proper donning, doffing , care use and function of the device. All went without incident  [facility]  [phone]
Due to patient's deconditioning and generalized weakness, secondary to Chronic T12 compression fracture and acute on chronic lower back pain, Patient will require transport chair. Patient is unable to self propel in a standard wheelchair. This is necessary to achieve daily tasks and therapies which cannot be achieved with use of a cane or rolling walker. Patient and family are in agreement with transport chair use at home and assistance will be provided if needed.

## 2021-04-11 PROCEDURE — 99233 SBSQ HOSP IP/OBS HIGH 50: CPT

## 2021-04-11 RX ADMIN — QUETIAPINE FUMARATE 25 MILLIGRAM(S): 200 TABLET, FILM COATED ORAL at 21:39

## 2021-04-11 RX ADMIN — POLYETHYLENE GLYCOL 3350 17 GRAM(S): 17 POWDER, FOR SOLUTION ORAL at 08:11

## 2021-04-11 RX ADMIN — PANTOPRAZOLE SODIUM 40 MILLIGRAM(S): 20 TABLET, DELAYED RELEASE ORAL at 06:03

## 2021-04-11 RX ADMIN — Medication 10 MILLIGRAM(S): at 08:12

## 2021-04-11 RX ADMIN — LIDOCAINE 1 PATCH: 4 CREAM TOPICAL at 22:46

## 2021-04-11 RX ADMIN — CELECOXIB 100 MILLIGRAM(S): 200 CAPSULE ORAL at 17:15

## 2021-04-11 RX ADMIN — LOSARTAN POTASSIUM 50 MILLIGRAM(S): 100 TABLET, FILM COATED ORAL at 05:52

## 2021-04-11 RX ADMIN — CELECOXIB 100 MILLIGRAM(S): 200 CAPSULE ORAL at 05:53

## 2021-04-11 RX ADMIN — Medication 60 MILLIGRAM(S): at 11:14

## 2021-04-11 RX ADMIN — CELECOXIB 100 MILLIGRAM(S): 200 CAPSULE ORAL at 18:26

## 2021-04-11 RX ADMIN — Medication 650 MILLIGRAM(S): at 12:35

## 2021-04-11 RX ADMIN — Medication 650 MILLIGRAM(S): at 11:14

## 2021-04-11 RX ADMIN — DONEPEZIL HYDROCHLORIDE 10 MILLIGRAM(S): 10 TABLET, FILM COATED ORAL at 21:39

## 2021-04-11 RX ADMIN — QUETIAPINE FUMARATE 25 MILLIGRAM(S): 200 TABLET, FILM COATED ORAL at 14:18

## 2021-04-11 RX ADMIN — TRAMADOL HYDROCHLORIDE 50 MILLIGRAM(S): 50 TABLET ORAL at 08:57

## 2021-04-11 RX ADMIN — LIDOCAINE 1 PATCH: 4 CREAM TOPICAL at 11:14

## 2021-04-11 RX ADMIN — QUETIAPINE FUMARATE 25 MILLIGRAM(S): 200 TABLET, FILM COATED ORAL at 05:52

## 2021-04-11 RX ADMIN — LIDOCAINE 1 PATCH: 4 CREAM TOPICAL at 19:30

## 2021-04-11 RX ADMIN — TRAMADOL HYDROCHLORIDE 50 MILLIGRAM(S): 50 TABLET ORAL at 10:03

## 2021-04-11 RX ADMIN — SENNA PLUS 2 TABLET(S): 8.6 TABLET ORAL at 21:38

## 2021-04-11 RX ADMIN — ENOXAPARIN SODIUM 40 MILLIGRAM(S): 100 INJECTION SUBCUTANEOUS at 11:15

## 2021-04-11 NOTE — PROGRESS NOTE ADULT - SUBJECTIVE AND OBJECTIVE BOX
Patient is a 85y old  Female who presents with a chief complaint of Acute Lower Back Pain (10 Apr 2021 07:32)      SUBJECTIVE / OVERNIGHT EVENTS:    MEDICATIONS  (STANDING):  bisacodyl Suppository 10 milliGRAM(s) Rectal daily  celecoxib 100 milliGRAM(s) Oral two times a day  donepezil 10 milliGRAM(s) Oral at bedtime  enoxaparin Injectable 40 milliGRAM(s) SubCutaneous daily  FLUoxetine 60 milliGRAM(s) Oral daily  losartan 50 milliGRAM(s) Oral daily  pantoprazole    Tablet 40 milliGRAM(s) Oral before breakfast  polyethylene glycol 3350 17 Gram(s) Oral daily  QUEtiapine 25 milliGRAM(s) Oral three times a day  senna 2 Tablet(s) Oral at bedtime    MEDICATIONS  (PRN):  acetaminophen   Tablet .. 650 milliGRAM(s) Oral every 6 hours PRN Mild Pain (1 - 3), Moderate Pain (4 - 6)  lidocaine   Patch 1 Patch Transdermal daily PRN back pain  traMADol 50 milliGRAM(s) Oral every 6 hours PRN Severe Pain (7 - 10)      CAPILLARY BLOOD GLUCOSE        I&O's Summary    10 Apr 2021 07:01  -  11 Apr 2021 07:00  --------------------------------------------------------  IN: 180 mL / OUT: 575 mL / NET: -395 mL        PHYSICAL EXAM:  Vital Signs Last 24 Hrs  T(C): 36.8 (04-11-21 @ 04:11)  T(F): 98.3 (04-11-21 @ 04:11), Max: 98.3 (04-11-21 @ 04:11)  HR: 50 (04-11-21 @ 04:11) (47 - 56)  BP: 162/91 (04-11-21 @ 04:11)  BP(mean): --  RR: 18 (04-11-21 @ 04:11) (17 - 18)  SpO2: 99% (04-11-21 @ 04:11) (94% - 99%)  Wt(kg): --    04-10 @ 07:01  -  04-11 @ 07:00  --------------------------------------------------------  IN: 180 mL / OUT: 575 mL / NET: -395 mL      Constitutional: NAD, awake and alert  EYES: EOMI  ENT:  Normal Hearing, no tonsillar exudates   Neck: Soft and supple , no thyromegaly   Respiratory: Breath sounds are clear bilaterally, No wheezing, rales or rhonchi  Cardiovascular: S1 and S2, regular rate and rhythm, no Murmurs, gallops or rubs, no JVD,    Gastrointestinal: Bowel Sounds present, soft, nontender, nondistended, no guarding, no rebound  Extremities: No cyanosis or clubbing; warm to touch  Vascular: 2+ peripheral pulses lower ex  Neurological: No focal deficits, CN II-XII intact bilaterally, sensation to light touch intact in all extremities, gait intact. Pupils are equally reactive to light and symmetrical in size.   Musculoskeletal: 5/5 strength b/l upper and lower extremities; no joint swelling.  Skin: No rashes  Psych: no depression or anhedonia, AAOx3  HEME: no bruises, no nose bleeds      Personally reviewed imaging, labs, EKG  LABS:                    RADIOLOGY & ADDITIONAL TESTS:    Imaging Personally Reviewed:    Consultant(s) Notes Reviewed:      Care Discussed with Consultants/Other Providers:   Patient is a 85y old  Female who presents with a chief complaint of Acute Lower Back Pain (10 Apr 2021 07:32)      SUBJECTIVE / OVERNIGHT EVENTS:  no acute events overnight   this morning patient is anxious and complaining that she wants to go home   says she still has gnawing back pain; has not received dose of tramadol   had a BM yesterday after suppository   denies chest pain, shortness of breath, fevers or chills     MEDICATIONS  (STANDING):  bisacodyl Suppository 10 milliGRAM(s) Rectal daily  celecoxib 100 milliGRAM(s) Oral two times a day  donepezil 10 milliGRAM(s) Oral at bedtime  enoxaparin Injectable 40 milliGRAM(s) SubCutaneous daily  FLUoxetine 60 milliGRAM(s) Oral daily  losartan 50 milliGRAM(s) Oral daily  pantoprazole    Tablet 40 milliGRAM(s) Oral before breakfast  polyethylene glycol 3350 17 Gram(s) Oral daily  QUEtiapine 25 milliGRAM(s) Oral three times a day  senna 2 Tablet(s) Oral at bedtime    MEDICATIONS  (PRN):  acetaminophen   Tablet .. 650 milliGRAM(s) Oral every 6 hours PRN Mild Pain (1 - 3), Moderate Pain (4 - 6)  lidocaine   Patch 1 Patch Transdermal daily PRN back pain  traMADol 50 milliGRAM(s) Oral every 6 hours PRN Severe Pain (7 - 10)      CAPILLARY BLOOD GLUCOSE        I&O's Summary    10 Apr 2021 07:01  -  11 Apr 2021 07:00  --------------------------------------------------------  IN: 180 mL / OUT: 575 mL / NET: -395 mL        PHYSICAL EXAM:  Vital Signs Last 24 Hrs  T(C): 36.8 (04-11-21 @ 04:11)  T(F): 98.3 (04-11-21 @ 04:11), Max: 98.3 (04-11-21 @ 04:11)  HR: 50 (04-11-21 @ 04:11) (47 - 56)  BP: 162/91 (04-11-21 @ 04:11)  BP(mean): --  RR: 18 (04-11-21 @ 04:11) (17 - 18)  SpO2: 99% (04-11-21 @ 04:11) (94% - 99%)  Wt(kg): --    04-10 @ 07:01  -  04-11 @ 07:00  --------------------------------------------------------  IN: 180 mL / OUT: 575 mL / NET: -395 mL      Constitutional: NAD, awake and alert  EYES: EOMI  ENT:  Normal Hearing, no tonsillar exudates   Neck: Soft and supple , no thyromegaly   Respiratory: Breath sounds are clear bilaterally, No wheezing, rales or rhonchi  Cardiovascular: S1 and S2, regular rate and rhythm, no Murmurs, gallops or rubs, no JVD,    Gastrointestinal: Bowel Sounds present, soft, nontender, nondistended, no guarding, no rebound, obese abdomen   Extremities: No cyanosis or clubbing; warm to touch  Vascular: 2+ peripheral pulses lower ex  Neurological: No focal deficits,   Musculoskeletal: moving all 4 extremities spontaneously   Skin: No rashes  Psych: anxious   HEME: no bruises, no nose bleeds

## 2021-04-11 NOTE — PROGRESS NOTE ADULT - PROBLEM SELECTOR PLAN 1
-L3-4 and L4-5 suspected impingement of both exiting nerve roots.  -L1-L2 suspected impingement of the exiting right L1 nerve root.  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 and anterolisthesis with nerve impingement   -patient fitted for LSO brace   -pending D/C, rehab

## 2021-04-12 ENCOUNTER — TRANSCRIPTION ENCOUNTER (OUTPATIENT)
Age: 86
End: 2021-04-12

## 2021-04-12 PROCEDURE — 99232 SBSQ HOSP IP/OBS MODERATE 35: CPT | Mod: GC

## 2021-04-12 PROCEDURE — 93010 ELECTROCARDIOGRAM REPORT: CPT | Mod: 76,77

## 2021-04-12 PROCEDURE — 93010 ELECTROCARDIOGRAM REPORT: CPT

## 2021-04-12 RX ORDER — PANTOPRAZOLE SODIUM 20 MG/1
1 TABLET, DELAYED RELEASE ORAL
Qty: 0 | Refills: 0 | DISCHARGE

## 2021-04-12 RX ORDER — ALPRAZOLAM 0.25 MG
0.25 TABLET ORAL ONCE
Refills: 0 | Status: DISCONTINUED | OUTPATIENT
Start: 2021-04-12 | End: 2021-04-12

## 2021-04-12 RX ORDER — PANTOPRAZOLE SODIUM 20 MG/1
1 TABLET, DELAYED RELEASE ORAL
Qty: 0 | Refills: 0 | DISCHARGE
Start: 2021-04-12

## 2021-04-12 RX ORDER — CELECOXIB 200 MG/1
1 CAPSULE ORAL
Qty: 10 | Refills: 0
Start: 2021-04-12 | End: 2021-04-16

## 2021-04-12 RX ORDER — TRAMADOL HYDROCHLORIDE 50 MG/1
1 TABLET ORAL
Qty: 20 | Refills: 0
Start: 2021-04-12 | End: 2021-04-16

## 2021-04-12 RX ORDER — METOPROLOL TARTRATE 50 MG
12.5 TABLET ORAL EVERY 6 HOURS
Refills: 0 | Status: DISCONTINUED | OUTPATIENT
Start: 2021-04-12 | End: 2021-04-13

## 2021-04-12 RX ADMIN — LOSARTAN POTASSIUM 50 MILLIGRAM(S): 100 TABLET, FILM COATED ORAL at 05:43

## 2021-04-12 RX ADMIN — TRAMADOL HYDROCHLORIDE 50 MILLIGRAM(S): 50 TABLET ORAL at 13:05

## 2021-04-12 RX ADMIN — Medication 12.5 MILLIGRAM(S): at 14:07

## 2021-04-12 RX ADMIN — CELECOXIB 100 MILLIGRAM(S): 200 CAPSULE ORAL at 07:03

## 2021-04-12 RX ADMIN — POLYETHYLENE GLYCOL 3350 17 GRAM(S): 17 POWDER, FOR SOLUTION ORAL at 12:27

## 2021-04-12 RX ADMIN — QUETIAPINE FUMARATE 25 MILLIGRAM(S): 200 TABLET, FILM COATED ORAL at 14:04

## 2021-04-12 RX ADMIN — QUETIAPINE FUMARATE 25 MILLIGRAM(S): 200 TABLET, FILM COATED ORAL at 05:42

## 2021-04-12 RX ADMIN — CELECOXIB 100 MILLIGRAM(S): 200 CAPSULE ORAL at 17:30

## 2021-04-12 RX ADMIN — CELECOXIB 100 MILLIGRAM(S): 200 CAPSULE ORAL at 05:43

## 2021-04-12 RX ADMIN — DONEPEZIL HYDROCHLORIDE 10 MILLIGRAM(S): 10 TABLET, FILM COATED ORAL at 23:49

## 2021-04-12 RX ADMIN — ENOXAPARIN SODIUM 40 MILLIGRAM(S): 100 INJECTION SUBCUTANEOUS at 12:27

## 2021-04-12 RX ADMIN — QUETIAPINE FUMARATE 25 MILLIGRAM(S): 200 TABLET, FILM COATED ORAL at 23:48

## 2021-04-12 RX ADMIN — CELECOXIB 100 MILLIGRAM(S): 200 CAPSULE ORAL at 17:06

## 2021-04-12 RX ADMIN — SENNA PLUS 2 TABLET(S): 8.6 TABLET ORAL at 23:49

## 2021-04-12 RX ADMIN — PANTOPRAZOLE SODIUM 40 MILLIGRAM(S): 20 TABLET, DELAYED RELEASE ORAL at 06:10

## 2021-04-12 RX ADMIN — Medication 60 MILLIGRAM(S): at 12:27

## 2021-04-12 RX ADMIN — Medication 0.25 MILLIGRAM(S): at 12:26

## 2021-04-12 RX ADMIN — TRAMADOL HYDROCHLORIDE 50 MILLIGRAM(S): 50 TABLET ORAL at 12:36

## 2021-04-12 NOTE — DISCHARGE NOTE NURSING/CASE MANAGEMENT/SOCIAL WORK - NSDCPNINST_GEN_ALL_CORE
call md for any discomforts felt--moisture associated dermatitis on bilateral abdominal pannus ( barrier cream applied)-- incontinent associated dermatitis in perineal area and bilateral buttocks (barrier cream applied)--pressure ulcer at sacral area ( barrier cream applied-- turn and change position frequently while lying down and sitting in chair) pressure ulcer on bilateral heels ( cavilon applied)--keep skin clean and dry always-- emotional support and reassurance encouraged

## 2021-04-12 NOTE — PROGRESS NOTE ADULT - ATTENDING COMMENTS
85F w/ PMHx of HTN, anxiety p/w acute low back pain over the last two weeks w/ radiation to RLE. She also reports increasing constipation over the last month. No bladder incontinence or bowel incontinence.    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 Improving w/ NSAID and tramadol.    Was planned for dc today but noted to feel jittery and w/ markedly elevated HR. ECG w/ Afib vs sinus tachy w/ frequent PACs (baseline sinus aspen@50). Will start low dose metop for now (she may tolerated poorly due to underlying bradycardia), consider cards eval, monitor overnight on tele.    Standing NSAID for pain, celecoxib given hiatal hernia  Tylenol-will make standing  Tramadol if severe pain  She is already on SSRI. Defer gabapentin or pregabalin due to limited evidence of effectiveness and high risk of side effect in the elderly.   Spine consult-no intervention planned  PT  Monitor on tele, follow ecgs, consider ac pending final read of ecg  Dispo pending resolution of cardiac issues 85F w/ PMHx of HTN, anxiety p/w acute low back pain over the last two weeks w/ radiation to RLE. She also reports increasing constipation over the last month. No bladder incontinence or bowel incontinence.    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 Improving w/ NSAID and tramadol.    Was planned for dc today but noted to feel jittery and w/ markedly elevated HR. ECG w/ Afib vs sinus tachy w/ frequent PACs (baseline sinus aspen@50). Will start low dose metop for now (she may tolerated poorly due to underlying bradycardia), consider cards eval, monitor overnight on tele. Anxiety may be playing a role in driving HR. Will give low dose xanax.    Standing NSAID for pain, celecoxib given hiatal hernia  Tylenol-will make standing  Tramadol if severe pain  She is already on SSRI. Defer gabapentin or pregabalin due to limited evidence of effectiveness and high risk of side effect in the elderly.   Spine consult-no intervention planned  PT  Monitor on tele, follow ecgs, consider ac pending final read of ecg  Dispo pending resolution of cardiac issues 85F w/ PMHx of HTN, anxiety p/w acute low back pain over the last two weeks w/ radiation to RLE. She also reports increasing constipation over the last month. No bladder incontinence or bowel incontinence.    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 Improving w/ NSAID and tramadol.    Was planned for dc today but noted to feel jittery and w/ markedly elevated HR. ECG w/ Afib vs sinus tachy w/ frequent PACs (baseline sinus aspen@50). Obtain repeat ecg once HR slower; if clearly fib will need to monitor further on tele. Otherwise likely tachy due to pain/anxiety and okay for dc home.    Standing NSAID for pain, celecoxib given hiatal hernia  Tylenol-will make standing  Tramadol if severe pain  She is already on SSRI. Defer gabapentin or pregabalin due to limited evidence of effectiveness and high risk of side effect in the elderly.   Spine consult-no intervention planned  PT  Repeat ecg to confirm if any afib.

## 2021-04-12 NOTE — DISCHARGE NOTE NURSING/CASE MANAGEMENT/SOCIAL WORK - PATIENT PORTAL LINK FT
You can access the FollowMyHealth Patient Portal offered by [facility] by registering at the following website: [email or web address]. By joining [facility]’s FollowMyHealth portal, you will also be able to view your health information using other applications (apps) compatible with our system.

## 2021-04-12 NOTE — PROVIDER CONTACT NOTE (OTHER) - SITUATION
85 year old female pt with a bp of 145/90 and a heart rate of 56 - pt has metoprolol ordered, but is below the HR parameter

## 2021-04-12 NOTE — DISCHARGE NOTE NURSING/CASE MANAGEMENT/SOCIAL WORK - NSDCFUADDAPPT_GEN_ALL_CORE_FT
Go to your Primary Care doctor to get a referral to a Cardiologist to follow up for an episode of Atrial Fibrillation and premature ectopic beats that you had in the hospital.

## 2021-04-12 NOTE — PROGRESS NOTE ADULT - SUBJECTIVE AND OBJECTIVE BOX
Patient is a 85y old  Female who presents with a chief complaint of Acute Lower Back Pain (11 Apr 2021 07:03)      SUBJECTIVE / OVERNIGHT EVENTS:  no acute events overnight   patient this am anxious to go home, still has 7/10 back pain but no bladder/bowel incontinence or trouble with moving legs. has not gotten her tramadol     MEDICATIONS  (STANDING):  bisacodyl Suppository 10 milliGRAM(s) Rectal daily  celecoxib 100 milliGRAM(s) Oral two times a day  donepezil 10 milliGRAM(s) Oral at bedtime  enoxaparin Injectable 40 milliGRAM(s) SubCutaneous daily  FLUoxetine 60 milliGRAM(s) Oral daily  losartan 50 milliGRAM(s) Oral daily  pantoprazole    Tablet 40 milliGRAM(s) Oral before breakfast  polyethylene glycol 3350 17 Gram(s) Oral daily  QUEtiapine 25 milliGRAM(s) Oral three times a day  senna 2 Tablet(s) Oral at bedtime    MEDICATIONS  (PRN):  acetaminophen   Tablet .. 650 milliGRAM(s) Oral every 6 hours PRN Mild Pain (1 - 3), Moderate Pain (4 - 6)  lidocaine   Patch 1 Patch Transdermal daily PRN back pain  traMADol 50 milliGRAM(s) Oral every 6 hours PRN Severe Pain (7 - 10)      CAPILLARY BLOOD GLUCOSE        I&O's Summary    11 Apr 2021 07:01  -  12 Apr 2021 07:00  --------------------------------------------------------  IN: 120 mL / OUT: 300 mL / NET: -180 mL        PHYSICAL EXAM:  Vital Signs Last 24 Hrs  T(C): 36.8 (04-12-21 @ 04:59)  T(F): 98.2 (04-12-21 @ 04:59), Max: 98.3 (04-11-21 @ 20:30)  HR: 51 (04-12-21 @ 04:59) (50 - 52)  BP: 167/74 (04-12-21 @ 04:59)  BP(mean): --  RR: 18 (04-12-21 @ 04:59) (17 - 18)  SpO2: 98% (04-12-21 @ 04:59) (97% - 98%)  Wt(kg): --    04-11 @ 07:01  -  04-12 @ 07:00  --------------------------------------------------------  IN: 120 mL / OUT: 300 mL / NET: -180 mL      Constitutional: NAD, awake and alert  EYES: EOMI  ENT:  Normal Hearing, no tonsillar exudates   Neck: Soft and supple , no thyromegaly   Respiratory: Breath sounds are clear bilaterally, No wheezing, rales or rhonchi  Cardiovascular: S1 and S2, regular rate and rhythm, no Murmurs, gallops or rubs, no JVD,    Gastrointestinal: Bowel Sounds present, soft, nontender, nondistended, no guarding, no rebound  Extremities: No cyanosis or clubbing; warm to touch  Vascular: 2+ peripheral pulses lower ex  Neurological: No focal deficits, CN II-XII intact bilaterally, sensation to light touch intact in all extremities, gait intact. Pupils are equally reactive to light and symmetrical in size.   Musculoskeletal: 5/5 strength b/l upper and lower extremities; no joint swelling.  Skin: No rashes  Psych: no depression or anhedonia, AAOx3  HEME: no bruises, no nose bleeds      Personally reviewed imaging, labs, EKG  LABS:                    RADIOLOGY & ADDITIONAL TESTS:    Imaging Personally Reviewed:    Consultant(s) Notes Reviewed:      Care Discussed with Consultants/Other Providers:

## 2021-04-12 NOTE — PROVIDER CONTACT NOTE (OTHER) - ACTION/TREATMENT ORDERED:
As per provider, continue to monitor pt, continue with safety precautions, continue with providers orders - push medication back one hour and recheck pt heart rate and BP

## 2021-04-13 ENCOUNTER — FORM ENCOUNTER (OUTPATIENT)
Age: 86
End: 2021-04-13

## 2021-04-13 VITALS — SYSTOLIC BLOOD PRESSURE: 136 MMHG | HEART RATE: 50 BPM | DIASTOLIC BLOOD PRESSURE: 69 MMHG

## 2021-04-13 LAB
ANION GAP SERPL CALC-SCNC: 11 MMOL/L — SIGNIFICANT CHANGE UP (ref 5–17)
BUN SERPL-MCNC: 21 MG/DL — SIGNIFICANT CHANGE UP (ref 7–23)
CALCIUM SERPL-MCNC: 9.4 MG/DL — SIGNIFICANT CHANGE UP (ref 8.4–10.5)
CHLORIDE SERPL-SCNC: 104 MMOL/L — SIGNIFICANT CHANGE UP (ref 96–108)
CO2 SERPL-SCNC: 25 MMOL/L — SIGNIFICANT CHANGE UP (ref 22–31)
CREAT SERPL-MCNC: 0.89 MG/DL — SIGNIFICANT CHANGE UP (ref 0.5–1.3)
GLUCOSE SERPL-MCNC: 81 MG/DL — SIGNIFICANT CHANGE UP (ref 70–99)
HCT VFR BLD CALC: 41.3 % — SIGNIFICANT CHANGE UP (ref 34.5–45)
HGB BLD-MCNC: 13.4 G/DL — SIGNIFICANT CHANGE UP (ref 11.5–15.5)
MAGNESIUM SERPL-MCNC: 1.9 MG/DL — SIGNIFICANT CHANGE UP (ref 1.6–2.6)
MCHC RBC-ENTMCNC: 32.4 GM/DL — SIGNIFICANT CHANGE UP (ref 32–36)
MCHC RBC-ENTMCNC: 32.6 PG — SIGNIFICANT CHANGE UP (ref 27–34)
MCV RBC AUTO: 100.5 FL — HIGH (ref 80–100)
NRBC # BLD: 0 /100 WBCS — SIGNIFICANT CHANGE UP (ref 0–0)
PHOSPHATE SERPL-MCNC: 3.4 MG/DL — SIGNIFICANT CHANGE UP (ref 2.5–4.5)
PLATELET # BLD AUTO: 206 K/UL — SIGNIFICANT CHANGE UP (ref 150–400)
POTASSIUM SERPL-MCNC: 3.6 MMOL/L — SIGNIFICANT CHANGE UP (ref 3.5–5.3)
POTASSIUM SERPL-SCNC: 3.6 MMOL/L — SIGNIFICANT CHANGE UP (ref 3.5–5.3)
RBC # BLD: 4.11 M/UL — SIGNIFICANT CHANGE UP (ref 3.8–5.2)
RBC # FLD: 14.3 % — SIGNIFICANT CHANGE UP (ref 10.3–14.5)
SODIUM SERPL-SCNC: 140 MMOL/L — SIGNIFICANT CHANGE UP (ref 135–145)
TSH SERPL-MCNC: 3.5 UIU/ML — SIGNIFICANT CHANGE UP (ref 0.27–4.2)
WBC # BLD: 6.46 K/UL — SIGNIFICANT CHANGE UP (ref 3.8–10.5)
WBC # FLD AUTO: 6.46 K/UL — SIGNIFICANT CHANGE UP (ref 3.8–10.5)

## 2021-04-13 PROCEDURE — 84484 ASSAY OF TROPONIN QUANT: CPT

## 2021-04-13 PROCEDURE — 85730 THROMBOPLASTIN TIME PARTIAL: CPT

## 2021-04-13 PROCEDURE — 72148 MRI LUMBAR SPINE W/O DYE: CPT

## 2021-04-13 PROCEDURE — 97116 GAIT TRAINING THERAPY: CPT

## 2021-04-13 PROCEDURE — 97166 OT EVAL MOD COMPLEX 45 MIN: CPT

## 2021-04-13 PROCEDURE — 81001 URINALYSIS AUTO W/SCOPE: CPT

## 2021-04-13 PROCEDURE — 71045 X-RAY EXAM CHEST 1 VIEW: CPT

## 2021-04-13 PROCEDURE — U0005: CPT

## 2021-04-13 PROCEDURE — 71250 CT THORAX DX C-: CPT

## 2021-04-13 PROCEDURE — 93005 ELECTROCARDIOGRAM TRACING: CPT

## 2021-04-13 PROCEDURE — 97161 PT EVAL LOW COMPLEX 20 MIN: CPT

## 2021-04-13 PROCEDURE — 99239 HOSP IP/OBS DSCHRG MGMT >30: CPT

## 2021-04-13 PROCEDURE — 85025 COMPLETE CBC W/AUTO DIFF WBC: CPT

## 2021-04-13 PROCEDURE — 72131 CT LUMBAR SPINE W/O DYE: CPT

## 2021-04-13 PROCEDURE — 87086 URINE CULTURE/COLONY COUNT: CPT

## 2021-04-13 PROCEDURE — 86769 SARS-COV-2 COVID-19 ANTIBODY: CPT

## 2021-04-13 PROCEDURE — 97112 NEUROMUSCULAR REEDUCATION: CPT

## 2021-04-13 PROCEDURE — U0003: CPT

## 2021-04-13 PROCEDURE — 99285 EMERGENCY DEPT VISIT HI MDM: CPT | Mod: 25

## 2021-04-13 PROCEDURE — 84443 ASSAY THYROID STIM HORMONE: CPT

## 2021-04-13 PROCEDURE — 97530 THERAPEUTIC ACTIVITIES: CPT

## 2021-04-13 PROCEDURE — 80053 COMPREHEN METABOLIC PANEL: CPT

## 2021-04-13 PROCEDURE — 85610 PROTHROMBIN TIME: CPT

## 2021-04-13 PROCEDURE — 80048 BASIC METABOLIC PNL TOTAL CA: CPT

## 2021-04-13 PROCEDURE — 84100 ASSAY OF PHOSPHORUS: CPT

## 2021-04-13 PROCEDURE — 83735 ASSAY OF MAGNESIUM: CPT

## 2021-04-13 PROCEDURE — 85027 COMPLETE CBC AUTOMATED: CPT

## 2021-04-13 RX ORDER — ONDANSETRON 8 MG/1
4 TABLET, FILM COATED ORAL ONCE
Refills: 0 | Status: COMPLETED | OUTPATIENT
Start: 2021-04-13 | End: 2021-04-13

## 2021-04-13 RX ADMIN — PANTOPRAZOLE SODIUM 40 MILLIGRAM(S): 20 TABLET, DELAYED RELEASE ORAL at 05:43

## 2021-04-13 RX ADMIN — Medication 60 MILLIGRAM(S): at 10:53

## 2021-04-13 RX ADMIN — QUETIAPINE FUMARATE 25 MILLIGRAM(S): 200 TABLET, FILM COATED ORAL at 05:43

## 2021-04-13 RX ADMIN — Medication 12.5 MILLIGRAM(S): at 03:03

## 2021-04-13 RX ADMIN — LOSARTAN POTASSIUM 50 MILLIGRAM(S): 100 TABLET, FILM COATED ORAL at 05:42

## 2021-04-13 RX ADMIN — ONDANSETRON 4 MILLIGRAM(S): 8 TABLET, FILM COATED ORAL at 10:53

## 2021-04-13 NOTE — PROGRESS NOTE ADULT - SUBJECTIVE AND OBJECTIVE BOX
Patient is a 85y old  Female who presents with a chief complaint of Acute Lower Back Pain (12 Apr 2021 08:52)      SUBJECTIVE / OVERNIGHT EVENTS:    MEDICATIONS  (STANDING):  bisacodyl Suppository 10 milliGRAM(s) Rectal daily  donepezil 10 milliGRAM(s) Oral at bedtime  enoxaparin Injectable 40 milliGRAM(s) SubCutaneous daily  FLUoxetine 60 milliGRAM(s) Oral daily  losartan 50 milliGRAM(s) Oral daily  metoprolol tartrate 12.5 milliGRAM(s) Oral every 6 hours  pantoprazole    Tablet 40 milliGRAM(s) Oral before breakfast  polyethylene glycol 3350 17 Gram(s) Oral daily  QUEtiapine 25 milliGRAM(s) Oral three times a day  senna 2 Tablet(s) Oral at bedtime    MEDICATIONS  (PRN):  acetaminophen   Tablet .. 650 milliGRAM(s) Oral every 6 hours PRN Mild Pain (1 - 3), Moderate Pain (4 - 6)  lidocaine   Patch 1 Patch Transdermal daily PRN back pain  traMADol 50 milliGRAM(s) Oral every 6 hours PRN Severe Pain (7 - 10)      CAPILLARY BLOOD GLUCOSE        I&O's Summary    12 Apr 2021 07:01  -  13 Apr 2021 07:00  --------------------------------------------------------  IN: 480 mL / OUT: 650 mL / NET: -170 mL        PHYSICAL EXAM:  Vital Signs Last 24 Hrs  T(C): 36.8 (04-13-21 @ 04:45)  T(F): 98.2 (04-13-21 @ 04:45), Max: 98.2 (04-13-21 @ 04:45)  HR: 50 (04-13-21 @ 07:50) (50 - 78)  BP: 136/69 (04-13-21 @ 07:50)  BP(mean): --  RR: 18 (04-13-21 @ 04:45) (18 - 18)  SpO2: 98% (04-13-21 @ 04:45) (95% - 99%)  Wt(kg): --    04-12 @ 07:01  -  04-13 @ 07:00  --------------------------------------------------------  IN: 480 mL / OUT: 650 mL / NET: -170 mL      Constitutional: NAD, awake and alert  EYES: EOMI  ENT:  Normal Hearing, no tonsillar exudates   Neck: Soft and supple , no thyromegaly   Respiratory: Breath sounds are clear bilaterally, No wheezing, rales or rhonchi  Cardiovascular: S1 and S2, regular rate and rhythm, no Murmurs, gallops or rubs, no JVD,    Gastrointestinal: Bowel Sounds present, soft, nontender, nondistended, no guarding, no rebound  Extremities: No cyanosis or clubbing; warm to touch  Vascular: 2+ peripheral pulses lower ex  Neurological: No focal deficits, CN II-XII intact bilaterally, sensation to light touch intact in all extremities, gait intact. Pupils are equally reactive to light and symmetrical in size.   Musculoskeletal: 5/5 strength b/l upper and lower extremities; no joint swelling.  Skin: No rashes  Psych: no depression or anhedonia, AAOx3  HEME: no bruises, no nose bleeds      Personally reviewed imaging, labs, EKG  LABS:                        13.4   6.46  )-----------( 206      ( 13 Apr 2021 08:16 )             41.3     04-13    140  |  104  |  21  ----------------------------<  81  3.6   |  25  |  0.89    Ca    9.4      13 Apr 2021 08:16  Phos  3.4     04-13  Mg     1.9     04-13                RADIOLOGY & ADDITIONAL TESTS:    Imaging Personally Reviewed:    Consultant(s) Notes Reviewed:      Care Discussed with Consultants/Other Providers:

## 2021-04-13 NOTE — PROGRESS NOTE ADULT - ATTENDING COMMENTS
85F w/ PMHx of HTN, anxiety p/w acute low back pain over the last two weeks w/ radiation to RLE. She also reports increasing constipation over the last month. No bladder incontinence or bowel incontinence.    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 Improving w/ NSAID and tramadol.    HR today back to baseline sinus aspen. ECGs reviewed by myself appear most c/w sinus interspersed w/ frequent PACs and PVCs, possibly short runs of afib. She is asymptomatic and now back in sinus aspen due to fall risk would not start AC until evaluated formally by a cardiologist and TTE obtained. This can be done as outpt and we have provided her with numbers for [facility] to follow up.    Standing NSAID for pain, celecoxib given hiatal hernia, limit to 10 days and c/w PPI  Tramadol if severe pain  She is already on SSRI. Defer gabapentin or pregabalin due to limited evidence of effectiveness and high risk of side effect in the elderly. QTc stable  Spine consult-no intervention planned  PT-family refusing VIJAY    Medically stable for discharge today. 35 mins spent coordinating care and planning for discharge. 85F w/ PMHx of HTN, anxiety p/w acute low back pain over the last two weeks w/ radiation to RLE. She also reports increasing constipation over the last month. No bladder incontinence or bowel incontinence.    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 Improving w/ NSAID and tramadol.    HR today back to baseline sinus aspen. ECGs reviewed by myself appear most c/w sinus interspersed w/ frequent PACs and PVCs, possibly short runs of afib. She is asymptomatic and now back in sinus aspen due to fall risk. As I am not fully convinced this is paroxysmal afib, would not start AC until evaluated formally by a cardiologist and TTE obtained. This can be done as outpt.    Standing NSAID for pain, celecoxib given hiatal hernia, limit to 10 days and c/w PPI  Tramadol if severe pain  She is already on SSRI. Defer gabapentin or pregabalin due to limited evidence of effectiveness and high risk of side effect in the elderly. QTc stable  Spine consult-no intervention planned  PT-family refusing VIJAY    Medically stable for discharge today. 35 mins spent coordinating care and planning for discharge.

## 2021-04-13 NOTE — PROGRESS NOTE ADULT - PROBLEM SELECTOR PROBLEM 1
Acute low back pain, unspecified back pain laterality, unspecified whether sciatica present

## 2021-04-13 NOTE — PROGRESS NOTE ADULT - REASON FOR ADMISSION
Acute Lower Back Pain

## 2021-04-13 NOTE — PROGRESS NOTE ADULT - PROBLEM SELECTOR PLAN 5
-will give miralax and senna   -dulcolax prn  -bowel movement recorded

## 2021-04-13 NOTE — PROGRESS NOTE ADULT - ASSESSMENT
85 year old female here for acute lower back pain found to have nerve impingement and anterolisthesis on imaging, fitted for brace, pending D/C home. 
85 year old female here for acute lower back pain found to have possible nerve impingment on CT scan causing radiculopathy. 
85 year old female here for acute lower back pain found to have nerve impingement and anterolisthesis on imaging, fitted for brace, pending D/C home. 
85 year old female here for acute lower back pain found to have possible nerve impingment on CT scan causing radiculopathy. 
85 year old female here for acute lower back pain found to have nerve impingement and anterolisthesis on imaging, fitted for brace, pending D/C home. 
85 year old female here for acute lower back pain found to have possible nerve impingment on CT scan causing radiculopathy.

## 2021-04-13 NOTE — PROGRESS NOTE ADULT - PROBLEM SELECTOR PLAN 6
-continue with losartan

## 2021-04-13 NOTE — PROGRESS NOTE ADULT - PROBLEM SELECTOR PLAN 3
complicated with anxiety   -continue with seroquel, prozac, and aricept

## 2021-04-13 NOTE — PROGRESS NOTE ADULT - PROBLEM SELECTOR PLAN 8
-pending PT eval, spine consult
-d/c home today with private health hire
-pending reinstatement of home health aide
-pending PT eval, spine consult
-d/c home today with private health hire
-pending PT eval, spine consult

## 2021-06-01 ENCOUNTER — APPOINTMENT (OUTPATIENT)
Dept: GERIATRICS | Facility: CLINIC | Age: 86
End: 2021-06-01

## 2021-07-07 ENCOUNTER — RX RENEWAL (OUTPATIENT)
Age: 86
End: 2021-07-07

## 2021-07-22 ENCOUNTER — APPOINTMENT (OUTPATIENT)
Dept: UROGYNECOLOGY | Facility: CLINIC | Age: 86
End: 2021-07-22
Payer: MEDICARE

## 2021-07-22 DIAGNOSIS — R35.0 FREQUENCY OF MICTURITION: ICD-10-CM

## 2021-07-22 PROBLEM — F10.10 ALCOHOL ABUSE, UNCOMPLICATED: Chronic | Status: ACTIVE | Noted: 2021-04-07

## 2021-07-22 PROBLEM — I10 ESSENTIAL (PRIMARY) HYPERTENSION: Chronic | Status: ACTIVE | Noted: 2021-04-07

## 2021-07-22 PROBLEM — D50.9 IRON DEFICIENCY ANEMIA, UNSPECIFIED: Chronic | Status: ACTIVE | Noted: 2021-04-07

## 2021-07-22 PROBLEM — F32.9 MAJOR DEPRESSIVE DISORDER, SINGLE EPISODE, UNSPECIFIED: Chronic | Status: ACTIVE | Noted: 2021-04-07

## 2021-07-22 PROCEDURE — 99213 OFFICE O/P EST LOW 20 MIN: CPT

## 2021-07-22 PROCEDURE — 81003 URINALYSIS AUTO W/O SCOPE: CPT | Mod: QW

## 2021-07-22 NOTE — PROCEDURE
[Straight Catheterization] : insertion of a straight catheter [Urinary Frequency] : urinary frequency [Other ___] : [unfilled] [Patient] : the patient [Other: ___] : [unfilled] [___ Fr Straight Tip] : a [unfilled] in [place] straight tip catheter [Clear] : clear [Culture] : culture [Urinalysis] : urinalysis [No Complications] : no complications [Tolerated Well] : the patient tolerated the procedure well [de-identified] : PVR 20mL [FreeTextEntry1] : Urethra cleared, catheter passed.  No CVAT.

## 2021-07-22 NOTE — DISCUSSION/SUMMARY
[FreeTextEntry1] : Urinary frequency/dysuria:\par -Trace blood.  Formal UA and CS sent.\par -eRX Phenazopyridine sent.\par -PT will use A&D ointment or Aquaphor generously to the vulva, labia, pad daily.\par \par Continue on Estradiol cream.\par \par Follow up in 3 months or sooner.  IF pt have any problems/concern to call office.  HHA verbalizes understanding and agrees.

## 2021-07-22 NOTE — HISTORY OF PRESENT ILLNESS
[FreeTextEntry1] : Brisa 87y/o presents to the office with c/o dysuria and urinary frequency.  Pt have this issues intermittently.  She is also using Estradiol cream and zinc oxide.  Denies any fever, chills, back pain, or blood in urine.\par Pt wears a pad daily.

## 2021-07-22 NOTE — PHYSICAL EXAM
[No Acute Distress] : in no acute distress [Well developed] : well developed [Well Nourished] : ~L well nourished [Good Hygeine] : demonstrates good hygeine [Oriented x3] : oriented to person, place, and time [Normal Memory] : ~T memory was ~L unimpaired [Normal Mood/Affect] : mood and affect are normal [Soft, Nontender] : the abdomen was soft and nontender [Warm and Dry] : was warm and dry to touch [Vulvar Atrophy] : vulvar atrophy [Labia Majora Erythema Right] : erythema [de-identified] : In Wheelchair, needs assist to stand and sit [de-identified] : mild erythema along the vulva especially on the right labia from chafing from wearing pads daily.

## 2021-07-24 ENCOUNTER — NON-APPOINTMENT (OUTPATIENT)
Age: 86
End: 2021-07-24

## 2021-07-26 LAB
APPEARANCE: CLEAR
BACTERIA UR CULT: NORMAL
BACTERIA: NEGATIVE
BILIRUBIN URINE: NEGATIVE
BLOOD URINE: NEGATIVE
COLOR: NORMAL
GLUCOSE QUALITATIVE U: NEGATIVE
HYALINE CASTS: 0 /LPF
KETONES URINE: NEGATIVE
LEUKOCYTE ESTERASE URINE: NEGATIVE
MICROSCOPIC-UA: NORMAL
NITRITE URINE: NEGATIVE
PH URINE: 6
PROTEIN URINE: NORMAL
RED BLOOD CELLS URINE: 4 /HPF
SPECIFIC GRAVITY URINE: 1.02
SQUAMOUS EPITHELIAL CELLS: 0 /HPF
UROBILINOGEN URINE: NORMAL
WHITE BLOOD CELLS URINE: 1 /HPF

## 2021-10-12 ENCOUNTER — APPOINTMENT (OUTPATIENT)
Dept: UROGYNECOLOGY | Facility: CLINIC | Age: 86
End: 2021-10-12
Payer: MEDICARE

## 2021-10-12 ENCOUNTER — APPOINTMENT (OUTPATIENT)
Dept: UROGYNECOLOGY | Facility: CLINIC | Age: 86
End: 2021-10-12

## 2021-10-12 VITALS — BODY MASS INDEX: 30.43 KG/M2 | HEIGHT: 60 IN | WEIGHT: 155 LBS

## 2021-10-12 VITALS — OXYGEN SATURATION: 97 % | TEMPERATURE: 96.9 F | HEIGHT: 60 IN

## 2021-10-12 DIAGNOSIS — N95.2 POSTMENOPAUSAL ATROPHIC VAGINITIS: ICD-10-CM

## 2021-10-12 DIAGNOSIS — R30.0 DYSURIA: ICD-10-CM

## 2021-10-12 LAB
BILIRUB UR QL STRIP: NEGATIVE
CLARITY UR: CLEAR
COLLECTION METHOD: NORMAL
GLUCOSE UR-MCNC: NEGATIVE
HCG UR QL: 0.2 EU/DL
HGB UR QL STRIP.AUTO: NORMAL
KETONES UR-MCNC: NEGATIVE
LEUKOCYTE ESTERASE UR QL STRIP: NEGATIVE
NITRITE UR QL STRIP: NEGATIVE
PH UR STRIP: 6
PROT UR STRIP-MCNC: NEGATIVE
SP GR UR STRIP: 1.01

## 2021-10-12 PROCEDURE — 99214 OFFICE O/P EST MOD 30 MIN: CPT

## 2021-10-12 PROCEDURE — 81003 URINALYSIS AUTO W/O SCOPE: CPT | Mod: QW

## 2021-10-13 LAB
APPEARANCE: CLEAR
BACTERIA: NEGATIVE
BILIRUBIN URINE: NEGATIVE
BLOOD URINE: NEGATIVE
COLOR: NORMAL
GLUCOSE QUALITATIVE U: NEGATIVE
HYALINE CASTS: 0 /LPF
KETONES URINE: NEGATIVE
LEUKOCYTE ESTERASE URINE: NEGATIVE
MICROSCOPIC-UA: NORMAL
NITRITE URINE: NEGATIVE
PH URINE: 6
PROTEIN URINE: NEGATIVE
RED BLOOD CELLS URINE: 1 /HPF
SPECIFIC GRAVITY URINE: 1.01
SQUAMOUS EPITHELIAL CELLS: 1 /HPF
UROBILINOGEN URINE: NORMAL
WHITE BLOOD CELLS URINE: 0 /HPF

## 2021-12-07 ENCOUNTER — RX RENEWAL (OUTPATIENT)
Age: 86
End: 2021-12-07

## 2021-12-08 ENCOUNTER — RX RENEWAL (OUTPATIENT)
Age: 86
End: 2021-12-08

## 2021-12-21 ENCOUNTER — APPOINTMENT (OUTPATIENT)
Dept: GERIATRICS | Facility: CLINIC | Age: 86
End: 2021-12-21

## 2021-12-30 ENCOUNTER — APPOINTMENT (OUTPATIENT)
Dept: OTOLARYNGOLOGY | Facility: CLINIC | Age: 86
End: 2021-12-30

## 2022-01-13 ENCOUNTER — APPOINTMENT (OUTPATIENT)
Dept: GERIATRICS | Facility: CLINIC | Age: 87
End: 2022-01-13
Payer: MEDICARE

## 2022-01-13 ENCOUNTER — LABORATORY RESULT (OUTPATIENT)
Age: 87
End: 2022-01-13

## 2022-01-13 VITALS
OXYGEN SATURATION: 97 % | RESPIRATION RATE: 16 BRPM | HEIGHT: 60 IN | DIASTOLIC BLOOD PRESSURE: 80 MMHG | WEIGHT: 152 LBS | HEART RATE: 68 BPM | BODY MASS INDEX: 29.84 KG/M2 | SYSTOLIC BLOOD PRESSURE: 124 MMHG

## 2022-01-13 DIAGNOSIS — H90.3 SENSORINEURAL HEARING LOSS, BILATERAL: ICD-10-CM

## 2022-01-13 DIAGNOSIS — M81.0 AGE-RELATED OSTEOPOROSIS W/OUT CURRENT PATHOLOGICAL FRACTURE: ICD-10-CM

## 2022-01-13 DIAGNOSIS — R06.00 DYSPNEA, UNSPECIFIED: ICD-10-CM

## 2022-01-13 DIAGNOSIS — H61.23 IMPACTED CERUMEN, BILATERAL: ICD-10-CM

## 2022-01-13 PROCEDURE — G0439: CPT

## 2022-01-13 PROCEDURE — 99497 ADVNCD CARE PLAN 30 MIN: CPT

## 2022-01-13 RX ORDER — CETIRIZINE HYDROCHLORIDE 10 MG/1
10 TABLET, COATED ORAL
Qty: 30 | Refills: 2 | Status: DISCONTINUED | COMMUNITY
Start: 2017-07-31 | End: 2022-01-13

## 2022-01-13 RX ORDER — PHENAZOPYRIDINE HYDROCHLORIDE 200 MG/1
200 TABLET ORAL
Qty: 12 | Refills: 0 | Status: DISCONTINUED | COMMUNITY
Start: 2020-02-20 | End: 2022-01-13

## 2022-01-13 RX ORDER — PHENAZOPYRIDINE 200 MG/1
200 TABLET, FILM COATED ORAL
Qty: 9 | Refills: 0 | Status: DISCONTINUED | COMMUNITY
Start: 2020-12-23 | End: 2022-01-13

## 2022-01-13 RX ORDER — PHENAZOPYRIDINE HYDROCHLORIDE 200 MG/1
200 TABLET ORAL 3 TIMES DAILY
Qty: 12 | Refills: 1 | Status: DISCONTINUED | COMMUNITY
Start: 2021-07-22 | End: 2022-01-13

## 2022-01-13 RX ORDER — NITROFURANTOIN MACROCRYSTALS 100 MG/1
100 CAPSULE ORAL
Qty: 10 | Refills: 0 | Status: DISCONTINUED | COMMUNITY
Start: 2020-02-20 | End: 2022-01-13

## 2022-01-13 RX ORDER — NITROFURANTOIN (MONOHYDRATE/MACROCRYSTALS) 25; 75 MG/1; MG/1
100 CAPSULE ORAL
Qty: 10 | Refills: 0 | Status: DISCONTINUED | COMMUNITY
Start: 2020-12-23 | End: 2022-01-13

## 2022-01-13 RX ORDER — CLOTRIMAZOLE AND BETAMETHASONE DIPROPIONATE 10; .5 MG/G; MG/G
1-0.05 CREAM TOPICAL
Qty: 1 | Refills: 4 | Status: DISCONTINUED | COMMUNITY
Start: 2019-01-17 | End: 2022-01-13

## 2022-01-13 NOTE — HISTORY OF PRESENT ILLNESS
[PMH Reviewed and Updated] : past medical history reviewed and updated [PSH Reviewed and Updated] : past surgical history reviewed and updated [Family History Reviewed and Updated] : family history reviewed and updated [Medication and Allergies Reconciled] : medication and allergies reconciled [Over the Past 2 Weeks, Have You Felt Down, Depressed, or Hopeless?] : 1.) Over the past 2 weeks, have you felt down, depressed, or hopeless? No [Over the Past 2 Weeks, Have You Felt Little Interest or Pleasure Doing Things?] : 2.) Over the past 2 weeks, have you felt little interest or pleasure doing things? No [Retired] : retired from work [None] : The patient has no concerns about alcohol abuse [Never] : has never used illicit drugs [Compliant with medications] : compliant with medications [Adequate] : adequate [Needs total help with ADLs] : need total help with ADLs [Does not drive] : does not drive [Seatbelts] : seatbelts [Smoke Detectors] : smoke detectors [Carbon Monoxide Detector] : carbon monoxide detector [Hot Water Temp <120F] : hot water temp <120F [Advanced Directives Discussed] : discussed at today's visit [de-identified] : 24 Hr HHA [de-identified] : AD's discussed with dtr PAMELA King, TYRONE completed 1/13/22 [FreeTextEntry1] : Mrs. Garner is an 86-year-old woman with a history of dementia, hypertension and hyperlipidemia presenting for an annual visit. She is accompanied by her daughter Christine and her home health aide. The patient has around-the-clock home health aides. The past year has been uneventful. She had a minor right wrist office surgery by Dr. Anastacio Louise. She has regular telemetry health visits with her geriatric psychiatrist Dr. Lilly. Overall behaviors have been well controlled with infrequent episodes of agitation. The patient has not experienced any falls. She is a minimal ambulator with a walker. She gets in-home physical therapy. [No falls in past year] : Patient reported no falls in the past year [Completely Dependent] : Completely dependent. [0] : 2) Feeling down, depressed, or hopeless: Not at all (0) [PHQ-2 Negative - No further assessment needed] : PHQ-2 Negative - No further assessment needed [[id number]] : 0

## 2022-01-13 NOTE — PHYSICAL EXAM
[Edema] : edema was not present [Normal] : normal skin color and pigmentation [No Focal Deficits] : no focal deficits [Normal Affect] : the affect was normal [de-identified] : heavy wax bilaterally. Able to remove wax from right ear only.

## 2022-01-13 NOTE — REVIEW OF SYSTEMS
[Loss Of Hearing] : hearing loss [SOB on Exertion] : shortness of breath during exertion [Incontinence] : incontinence [Confused] : confusion [Suicidal] : not suicidal [Anxiety] : anxiety [Negative] : Heme/Lymph

## 2022-01-14 LAB
ALBUMIN SERPL ELPH-MCNC: 4 G/DL
ALP BLD-CCNC: 84 U/L
ALT SERPL-CCNC: 16 U/L
ANION GAP SERPL CALC-SCNC: 14 MMOL/L
AST SERPL-CCNC: 23 U/L
BASOPHILS # BLD AUTO: 0.06 K/UL
BASOPHILS NFR BLD AUTO: 1 %
BILIRUB SERPL-MCNC: 0.3 MG/DL
BUN SERPL-MCNC: 10 MG/DL
CALCIUM SERPL-MCNC: 9.2 MG/DL
CHLORIDE SERPL-SCNC: 106 MMOL/L
CHOLEST SERPL-MCNC: 141 MG/DL
CO2 SERPL-SCNC: 25 MMOL/L
CREAT SERPL-MCNC: 0.77 MG/DL
EOSINOPHIL # BLD AUTO: 0.11 K/UL
EOSINOPHIL NFR BLD AUTO: 1.8 %
FOLATE SERPL-MCNC: >20 NG/ML
GLUCOSE SERPL-MCNC: 76 MG/DL
HCT VFR BLD CALC: 41.4 %
HDLC SERPL-MCNC: 50 MG/DL
HGB BLD-MCNC: 13 G/DL
IMM GRANULOCYTES NFR BLD AUTO: 0.3 %
IRON SATN MFR SERPL: 16 %
IRON SERPL-MCNC: 33 UG/DL
LDLC SERPL CALC-MCNC: 65 MG/DL
LYMPHOCYTES # BLD AUTO: 2.09 K/UL
LYMPHOCYTES NFR BLD AUTO: 34.7 %
MAN DIFF?: NORMAL
MCHC RBC-ENTMCNC: 31.4 GM/DL
MCHC RBC-ENTMCNC: 33.2 PG
MCV RBC AUTO: 105.6 FL
MONOCYTES # BLD AUTO: 0.57 K/UL
MONOCYTES NFR BLD AUTO: 9.5 %
NEUTROPHILS # BLD AUTO: 3.17 K/UL
NEUTROPHILS NFR BLD AUTO: 52.7 %
NONHDLC SERPL-MCNC: 91 MG/DL
PLATELET # BLD AUTO: 255 K/UL
POTASSIUM SERPL-SCNC: 3.8 MMOL/L
PROT SERPL-MCNC: 6.4 G/DL
RBC # BLD: 3.92 M/UL
RBC # FLD: 14.2 %
SODIUM SERPL-SCNC: 144 MMOL/L
TIBC SERPL-MCNC: 205 UG/DL
TRIGL SERPL-MCNC: 131 MG/DL
TSH SERPL-ACNC: 2.65 UIU/ML
UIBC SERPL-MCNC: 172 UG/DL
VIT B12 SERPL-MCNC: >2000 PG/ML
WBC # FLD AUTO: 6.02 K/UL

## 2022-03-05 ENCOUNTER — RX RENEWAL (OUTPATIENT)
Age: 87
End: 2022-03-05

## 2022-03-08 ENCOUNTER — NON-APPOINTMENT (OUTPATIENT)
Age: 87
End: 2022-03-08

## 2022-04-14 ENCOUNTER — APPOINTMENT (OUTPATIENT)
Dept: GERIATRICS | Facility: CLINIC | Age: 87
End: 2022-04-14
Payer: MEDICARE

## 2022-04-14 VITALS
SYSTOLIC BLOOD PRESSURE: 110 MMHG | BODY MASS INDEX: 29.72 KG/M2 | HEIGHT: 60 IN | WEIGHT: 151.38 LBS | OXYGEN SATURATION: 97 % | HEART RATE: 62 BPM | DIASTOLIC BLOOD PRESSURE: 60 MMHG | TEMPERATURE: 98 F | RESPIRATION RATE: 16 BRPM

## 2022-04-14 DIAGNOSIS — F41.9 ANXIETY DISORDER, UNSPECIFIED: ICD-10-CM

## 2022-04-14 DIAGNOSIS — R29.6 REPEATED FALLS: ICD-10-CM

## 2022-04-14 PROCEDURE — 99214 OFFICE O/P EST MOD 30 MIN: CPT | Mod: 25

## 2022-04-14 NOTE — PHYSICAL EXAM
[Alert] : alert [Well Nourished] : well nourished [No Acute Distress] : in no acute distress [Sclera] : the sclera and conjunctiva were normal [Normal Outer Ear/Nose] : the ears and nose were normal in appearance [Normal Hearing] : hearing was normal [Right Tympanic Membrane Was Examined] : was normal [] : was obscured [Partially Obscured] : partially [Cerumen in Canal] : by cerumen [Normal Appearance] : the appearance of the neck was normal [No Respiratory Distress] : no respiratory distress [No Acc Muscle Use] : no accessory muscle use [Respiration, Rhythm And Depth] : normal respiratory rhythm and effort [Auscultation Breath Sounds / Voice Sounds] : lungs were clear to auscultation bilaterally [Normal S1, S2] : normal S1 and S2 [Heart Rate And Rhythm] : heart rate was normal and rhythm regular [Edema] : edema was not present [Bowel Sounds] : normal bowel sounds [Abdomen Tenderness] : non-tender [Abdomen Soft] : soft [No Clubbing, Cyanosis] : no clubbing or cyanosis of the fingernails [Involuntary Movements] : no involuntary movements were seen [Motor Tone] : muscle strength and tone were normal [Normal Color / Pigmentation] : normal skin color and pigmentation [No Focal Deficits] : no focal deficits [Normal Affect] : the affect was normal [Normal Mood] : the mood was normal

## 2022-04-14 NOTE — REASON FOR VISIT
[Follow-Up] : a follow-up visit [Formal Caregiver] : formal caregiver [Family Member] : family member [FreeTextEntry3] : daughter and HHA

## 2022-04-14 NOTE — REVIEW OF SYSTEMS
[Loss Of Hearing] : hearing loss [Constipation] : constipation [Negative] : Heme/Lymph [Fever] : no fever [Chills] : no chills [Feeling Poorly] : not feeling poorly [Earache] : no earache [Heart Rate Is Fast] : the heart rate was not fast [Chest Pain] : no chest pain [Palpitations] : no palpitations [Lower Ext Edema] : no lower extremity edema [Shortness Of Breath] : no shortness of breath [Wheezing] : no wheezing [SOB on Exertion] : no shortness of breath during exertion [Abdominal Pain] : no abdominal pain [Vomiting] : no vomiting [Diarrhea] : no diarrhea [Melena] : no melena [Dysuria] : no dysuria [Joint Swelling] : no joint swelling [Sleep Disturbances] : no sleep disturbances

## 2022-04-14 NOTE — HISTORY OF PRESENT ILLNESS
[No falls in past year] : Patient reported no falls in the past year [Full assistance needed] : Assistance needed managing medications [Walker] : walker [Wheelchair] : wheelchair [Memory Lapses Or Loss] : stable memory impairment [Patient Observed To Be Agitated] : denies agitation [Hostility Toward Caregivers] : denies aggression [Sleep Disturbances] : denies sleep disturbances [] : denies wandering [Fixed Beliefs Contradicted By Reality (Delusions)] : denies delusions [Difficulty Finding Desired Words] : denies difficulty finding desired words [FreeTextEntry1] : 87yo female presents for follow-up. PMHX of HTN, HLD, Dementia, anxiety/depression. \par \par TODAY, patient has no acute complaints today is doing well overall. No recent falls, no recent hospitalizations. \par \par HTN: on losartan daily, tolerating well. Well controlled. no dizziness. \par Anxiety/depression: well controlled, in good spirits. Has extremely supportive family and home environment. \par Dementia: dependent in all ADLs and IADLs. has HHA 24-7 and requires enhanced supervision for safety as patient remains at high risk for fall. Is on namenda and donepezil, HR normal.  \par HLD: On statin, normal labs last labs. \par

## 2022-04-14 NOTE — END OF VISIT
[] : Fellow [FreeTextEntry3] : Mrs. Garner was seen with Dr. Johnston, geriatric fellow. I obtained a detailed interval history and personally examined the patient. I discussed my findings and plan with the patient, her daughter and the fellow, reviewed the fellow's note and agree with assessment and plan. Overall patient is doing well. She is benefiting from 24-hour supervision.

## 2022-05-19 NOTE — PATIENT PROFILE ADULT - NSPROPOAPRESSUREINJURYCT_GEN_A_NUR
Office Visit    Assessment AND Plan    IBS with intermittent abdominal cramping and constipation.  Has not been able to tolerate dicyclomine which was prescribed recently.  She is scheduled undergo upper endoscopy with gastroenterologist at [facility] next week.  Further evaluation and management by gastroenterologist.    Head concussion since January of this year continues to have chronic daily headaches /chronic posttraumatic headache not intractable . Currently on gabapentin as well as tramadol and Ambien at night.  Recommend that we started on amitriptyline however she declined as she was concerned about the side effects.  Agreed to try taking that tramadol every single night along with the Ambien to help improve the headaches and to continue with the current regimen of gabapentin.   Low-grade B-cell lymphoma, oncologist Dr. Velez for is recommending no intervention, no chemotherapy/observation only .  Patient is agreeable  Return in a month   CHIEF COMPLAINT    Follow-up        History of present illness    She is here today for follow-up       I have reviewed the past medical, family and social history sections including the medications and allergies listed in the above medical record as well as the nursing notes.       Physical Exam    Vital Signs:  Blood pressure 110/64, pulse 60, resp. rate 16, weight 66.7 kg (147 lb 0.8 oz).  Constitutional:  No acute distress.  Heart:  Normal S1-S2  Lungs:  Clear.      The patient indicates understanding of these issues and agrees with the plan.        3

## 2022-06-03 ENCOUNTER — RX RENEWAL (OUTPATIENT)
Age: 87
End: 2022-06-03

## 2022-06-30 ENCOUNTER — RX RENEWAL (OUTPATIENT)
Age: 87
End: 2022-06-30

## 2022-10-13 NOTE — HISTORY OF PRESENT ILLNESS
[FreeTextEntry1] : \johnnie Ledezma is an 84yo with severe vulvovaginal atrophy, previously using estrace cream. She was last seen on 2/20/20 and was found to have an E.Coli UTI. She was treated with macrobid and her symptoms resolved. She presents today with her HHA, Mary, because of increased frequency, urgency, dysuria. As per Mary, she has been a little more confused lately. Denies hematuria, vaginal bleeding. She has been using the estrace cream and alternating with lotrisone/zinc oxide. 
160

## 2023-06-15 ENCOUNTER — APPOINTMENT (OUTPATIENT)
Dept: GERIATRICS | Facility: CLINIC | Age: 88
End: 2023-06-15
Payer: MEDICARE

## 2023-06-15 VITALS
TEMPERATURE: 98.1 F | BODY MASS INDEX: 29.49 KG/M2 | RESPIRATION RATE: 14 BRPM | OXYGEN SATURATION: 97 % | HEART RATE: 62 BPM | WEIGHT: 151 LBS | SYSTOLIC BLOOD PRESSURE: 122 MMHG | DIASTOLIC BLOOD PRESSURE: 75 MMHG

## 2023-06-15 DIAGNOSIS — E78.5 HYPERLIPIDEMIA, UNSPECIFIED: ICD-10-CM

## 2023-06-15 DIAGNOSIS — H61.22 IMPACTED CERUMEN, LEFT EAR: ICD-10-CM

## 2023-06-15 PROCEDURE — 69210 REMOVE IMPACTED EAR WAX UNI: CPT

## 2023-06-15 PROCEDURE — 99214 OFFICE O/P EST MOD 30 MIN: CPT | Mod: 25

## 2023-06-15 RX ORDER — ZOSTER VACCINE RECOMBINANT, ADJUVANTED 50 MCG/0.5
50 KIT INTRAMUSCULAR
Qty: 2 | Refills: 0 | Status: DISCONTINUED | COMMUNITY
Start: 2018-07-12 | End: 2023-06-15

## 2023-06-15 RX ORDER — MEMANTINE HYDROCHLORIDE 5 MG/1
5 TABLET ORAL DAILY
Refills: 0 | Status: DISCONTINUED | COMMUNITY
Start: 2020-09-24 | End: 2023-06-15

## 2023-06-15 RX ORDER — MEMANTINE HYDROCHLORIDE 14 MG/1
14 CAPSULE, EXTENDED RELEASE ORAL
Qty: 90 | Refills: 3 | Status: ACTIVE | COMMUNITY
Start: 2023-06-15

## 2023-06-15 RX ORDER — ESTRADIOL 0.1 MG/G
0.1 CREAM VAGINAL
Qty: 1 | Refills: 0 | Status: DISCONTINUED | COMMUNITY
Start: 2019-01-17 | End: 2023-06-15

## 2023-06-15 RX ORDER — FLUOXETINE HYDROCHLORIDE 20 MG/1
20 CAPSULE ORAL DAILY
Qty: 60 | Refills: 0 | Status: ACTIVE | COMMUNITY
Start: 2018-06-07

## 2023-06-15 NOTE — REVIEW OF SYSTEMS
[Loss Of Hearing] : hearing loss [Difficulty Walking] : difficulty walking [As Noted in HPI] : as noted in HPI [Negative] : Heme/Lymph

## 2023-06-15 NOTE — PHYSICAL EXAM
[Edema] : edema was not present [Normal] : normal skin color and pigmentation [No Focal Deficits] : no focal deficits [de-identified] : Bilateral complete wax impaction.  Removed large amount of wax from both ears.  Tympanic membranes appear normal.  Canals normal [de-identified] : 2/6 systolic ejection murmur aortic area [de-identified] : Oriented to person

## 2023-06-15 NOTE — HISTORY OF PRESENT ILLNESS
[FreeTextEntry1] : Mrs. Garner presents for follow-up accompanied by her daughter Christine and her home health aide.  The patient was hospitalized in March with agitation.  Initially daughter felt that agitation was due to a possible UTI and started antibiotics on her own.  The patient was monitored in the ED and discharged the next day but agitation worsened and patient was brought back to the hospital and had a 5-day stay.  It turns out there was confusion with medication patient was not getting her Seroquel.  All studies were negative.  It took a while for patient to return to her baseline but she seems to be doing well at this point.  Her only complaint is diminished hearing and fullness in her ears.  Mood has been quite good.  Agitation has resolved. [No falls in past year] : Patient reported no falls in the past year [Completely Dependent] : Completely dependent. [0] : 2) Feeling down, depressed, or hopeless: Not at all (0) [PHQ-2 Negative - No further assessment needed] : PHQ-2 Negative - No further assessment needed [ASF1Bglca] : 0

## 2023-06-16 LAB
ALBUMIN SERPL ELPH-MCNC: 4.2 G/DL
ALP BLD-CCNC: 81 U/L
ALT SERPL-CCNC: 13 U/L
ANION GAP SERPL CALC-SCNC: 16 MMOL/L
AST SERPL-CCNC: 23 U/L
BILIRUB SERPL-MCNC: 0.3 MG/DL
BUN SERPL-MCNC: 14 MG/DL
CALCIUM SERPL-MCNC: 9.7 MG/DL
CHLORIDE SERPL-SCNC: 107 MMOL/L
CHOLEST SERPL-MCNC: 170 MG/DL
CO2 SERPL-SCNC: 24 MMOL/L
CREAT SERPL-MCNC: 0.97 MG/DL
EGFR: 57 ML/MIN/1.73M2
GLUCOSE SERPL-MCNC: 77 MG/DL
HDLC SERPL-MCNC: 72 MG/DL
LDLC SERPL CALC-MCNC: 81 MG/DL
NONHDLC SERPL-MCNC: 98 MG/DL
POTASSIUM SERPL-SCNC: 4.2 MMOL/L
PROT SERPL-MCNC: 6.8 G/DL
SODIUM SERPL-SCNC: 146 MMOL/L
TRIGL SERPL-MCNC: 86 MG/DL

## 2023-09-03 ENCOUNTER — NON-APPOINTMENT (OUTPATIENT)
Age: 88
End: 2023-09-03

## 2023-09-04 RX ORDER — LOSARTAN POTASSIUM 50 MG/1
50 TABLET, FILM COATED ORAL
Qty: 90 | Refills: 3 | Status: ACTIVE | COMMUNITY
Start: 2023-09-04 | End: 1900-01-01

## 2023-09-20 ENCOUNTER — RX RENEWAL (OUTPATIENT)
Age: 88
End: 2023-09-20

## 2023-11-14 ENCOUNTER — INPATIENT (INPATIENT)
Facility: HOSPITAL | Age: 88
LOS: 6 days | Discharge: HOME HEALTH | DRG: 481 | End: 2023-11-21
Attending: ORTHOPAEDIC SURGERY | Admitting: ORTHOPAEDIC SURGERY
Payer: MEDICARE

## 2023-11-14 ENCOUNTER — TRANSCRIPTION ENCOUNTER (OUTPATIENT)
Age: 88
End: 2023-11-14

## 2023-11-14 VITALS
SYSTOLIC BLOOD PRESSURE: 111 MMHG | OXYGEN SATURATION: 98 % | TEMPERATURE: 98 F | HEART RATE: 90 BPM | RESPIRATION RATE: 14 BRPM | DIASTOLIC BLOOD PRESSURE: 73 MMHG

## 2023-11-14 DIAGNOSIS — Z96.659 PRESENCE OF UNSPECIFIED ARTIFICIAL KNEE JOINT: Chronic | ICD-10-CM

## 2023-11-14 DIAGNOSIS — F03.90 UNSPECIFIED DEMENTIA WITHOUT BEHAVIORAL DISTURBANCE: ICD-10-CM

## 2023-11-14 DIAGNOSIS — S72.91XA UNSPECIFIED FRACTURE OF RIGHT FEMUR, INITIAL ENCOUNTER FOR CLOSED FRACTURE: ICD-10-CM

## 2023-11-14 DIAGNOSIS — I10 ESSENTIAL (PRIMARY) HYPERTENSION: ICD-10-CM

## 2023-11-14 DIAGNOSIS — S72.301A UNSPECIFIED FRACTURE OF SHAFT OF RIGHT FEMUR, INITIAL ENCOUNTER FOR CLOSED FRACTURE: ICD-10-CM

## 2023-11-14 DIAGNOSIS — R52 PAIN, UNSPECIFIED: ICD-10-CM

## 2023-11-14 DIAGNOSIS — F41.9 ANXIETY DISORDER, UNSPECIFIED: ICD-10-CM

## 2023-11-14 LAB
ALBUMIN SERPL ELPH-MCNC: 3.6 G/DL — SIGNIFICANT CHANGE UP (ref 3.3–5)
ALBUMIN SERPL ELPH-MCNC: 3.6 G/DL — SIGNIFICANT CHANGE UP (ref 3.3–5)
ALP SERPL-CCNC: 68 U/L — SIGNIFICANT CHANGE UP (ref 40–120)
ALP SERPL-CCNC: 68 U/L — SIGNIFICANT CHANGE UP (ref 40–120)
ALT FLD-CCNC: 13 U/L — SIGNIFICANT CHANGE UP (ref 10–45)
ALT FLD-CCNC: 13 U/L — SIGNIFICANT CHANGE UP (ref 10–45)
ANION GAP SERPL CALC-SCNC: 18 MMOL/L — HIGH (ref 5–17)
ANION GAP SERPL CALC-SCNC: 18 MMOL/L — HIGH (ref 5–17)
APTT BLD: 27.4 SEC — SIGNIFICANT CHANGE UP (ref 24.5–35.6)
APTT BLD: 27.4 SEC — SIGNIFICANT CHANGE UP (ref 24.5–35.6)
AST SERPL-CCNC: 25 U/L — SIGNIFICANT CHANGE UP (ref 10–40)
AST SERPL-CCNC: 25 U/L — SIGNIFICANT CHANGE UP (ref 10–40)
BASOPHILS # BLD AUTO: 0.02 K/UL — SIGNIFICANT CHANGE UP (ref 0–0.2)
BASOPHILS # BLD AUTO: 0.02 K/UL — SIGNIFICANT CHANGE UP (ref 0–0.2)
BASOPHILS NFR BLD AUTO: 0.2 % — SIGNIFICANT CHANGE UP (ref 0–2)
BASOPHILS NFR BLD AUTO: 0.2 % — SIGNIFICANT CHANGE UP (ref 0–2)
BILIRUB SERPL-MCNC: 0.7 MG/DL — SIGNIFICANT CHANGE UP (ref 0.2–1.2)
BILIRUB SERPL-MCNC: 0.7 MG/DL — SIGNIFICANT CHANGE UP (ref 0.2–1.2)
BUN SERPL-MCNC: 15 MG/DL — SIGNIFICANT CHANGE UP (ref 7–23)
BUN SERPL-MCNC: 15 MG/DL — SIGNIFICANT CHANGE UP (ref 7–23)
CALCIUM SERPL-MCNC: 9.5 MG/DL — SIGNIFICANT CHANGE UP (ref 8.4–10.5)
CALCIUM SERPL-MCNC: 9.5 MG/DL — SIGNIFICANT CHANGE UP (ref 8.4–10.5)
CHLORIDE SERPL-SCNC: 102 MMOL/L — SIGNIFICANT CHANGE UP (ref 96–108)
CHLORIDE SERPL-SCNC: 102 MMOL/L — SIGNIFICANT CHANGE UP (ref 96–108)
CO2 SERPL-SCNC: 21 MMOL/L — LOW (ref 22–31)
CO2 SERPL-SCNC: 21 MMOL/L — LOW (ref 22–31)
CREAT SERPL-MCNC: 0.87 MG/DL — SIGNIFICANT CHANGE UP (ref 0.5–1.3)
CREAT SERPL-MCNC: 0.87 MG/DL — SIGNIFICANT CHANGE UP (ref 0.5–1.3)
EGFR: 65 ML/MIN/1.73M2 — SIGNIFICANT CHANGE UP
EGFR: 65 ML/MIN/1.73M2 — SIGNIFICANT CHANGE UP
EOSINOPHIL # BLD AUTO: 0 K/UL — SIGNIFICANT CHANGE UP (ref 0–0.5)
EOSINOPHIL # BLD AUTO: 0 K/UL — SIGNIFICANT CHANGE UP (ref 0–0.5)
EOSINOPHIL NFR BLD AUTO: 0 % — SIGNIFICANT CHANGE UP (ref 0–6)
EOSINOPHIL NFR BLD AUTO: 0 % — SIGNIFICANT CHANGE UP (ref 0–6)
GLUCOSE SERPL-MCNC: 167 MG/DL — HIGH (ref 70–99)
GLUCOSE SERPL-MCNC: 167 MG/DL — HIGH (ref 70–99)
HCT VFR BLD CALC: 36 % — SIGNIFICANT CHANGE UP (ref 34.5–45)
HCT VFR BLD CALC: 36 % — SIGNIFICANT CHANGE UP (ref 34.5–45)
HGB BLD-MCNC: 12 G/DL — SIGNIFICANT CHANGE UP (ref 11.5–15.5)
HGB BLD-MCNC: 12 G/DL — SIGNIFICANT CHANGE UP (ref 11.5–15.5)
IMM GRANULOCYTES NFR BLD AUTO: 0.6 % — SIGNIFICANT CHANGE UP (ref 0–0.9)
IMM GRANULOCYTES NFR BLD AUTO: 0.6 % — SIGNIFICANT CHANGE UP (ref 0–0.9)
INR BLD: 1.17 RATIO — SIGNIFICANT CHANGE UP (ref 0.85–1.18)
INR BLD: 1.17 RATIO — SIGNIFICANT CHANGE UP (ref 0.85–1.18)
LYMPHOCYTES # BLD AUTO: 0.47 K/UL — LOW (ref 1–3.3)
LYMPHOCYTES # BLD AUTO: 0.47 K/UL — LOW (ref 1–3.3)
LYMPHOCYTES # BLD AUTO: 4.1 % — LOW (ref 13–44)
LYMPHOCYTES # BLD AUTO: 4.1 % — LOW (ref 13–44)
MCHC RBC-ENTMCNC: 33.1 PG — SIGNIFICANT CHANGE UP (ref 27–34)
MCHC RBC-ENTMCNC: 33.1 PG — SIGNIFICANT CHANGE UP (ref 27–34)
MCHC RBC-ENTMCNC: 33.3 GM/DL — SIGNIFICANT CHANGE UP (ref 32–36)
MCHC RBC-ENTMCNC: 33.3 GM/DL — SIGNIFICANT CHANGE UP (ref 32–36)
MCV RBC AUTO: 99.2 FL — SIGNIFICANT CHANGE UP (ref 80–100)
MCV RBC AUTO: 99.2 FL — SIGNIFICANT CHANGE UP (ref 80–100)
MONOCYTES # BLD AUTO: 0.5 K/UL — SIGNIFICANT CHANGE UP (ref 0–0.9)
MONOCYTES # BLD AUTO: 0.5 K/UL — SIGNIFICANT CHANGE UP (ref 0–0.9)
MONOCYTES NFR BLD AUTO: 4.3 % — SIGNIFICANT CHANGE UP (ref 2–14)
MONOCYTES NFR BLD AUTO: 4.3 % — SIGNIFICANT CHANGE UP (ref 2–14)
NEUTROPHILS # BLD AUTO: 10.44 K/UL — HIGH (ref 1.8–7.4)
NEUTROPHILS # BLD AUTO: 10.44 K/UL — HIGH (ref 1.8–7.4)
NEUTROPHILS NFR BLD AUTO: 90.8 % — HIGH (ref 43–77)
NEUTROPHILS NFR BLD AUTO: 90.8 % — HIGH (ref 43–77)
NRBC # BLD: 0 /100 WBCS — SIGNIFICANT CHANGE UP (ref 0–0)
NRBC # BLD: 0 /100 WBCS — SIGNIFICANT CHANGE UP (ref 0–0)
PLATELET # BLD AUTO: 207 K/UL — SIGNIFICANT CHANGE UP (ref 150–400)
PLATELET # BLD AUTO: 207 K/UL — SIGNIFICANT CHANGE UP (ref 150–400)
POTASSIUM SERPL-MCNC: 4 MMOL/L — SIGNIFICANT CHANGE UP (ref 3.5–5.3)
POTASSIUM SERPL-MCNC: 4 MMOL/L — SIGNIFICANT CHANGE UP (ref 3.5–5.3)
POTASSIUM SERPL-SCNC: 4 MMOL/L — SIGNIFICANT CHANGE UP (ref 3.5–5.3)
POTASSIUM SERPL-SCNC: 4 MMOL/L — SIGNIFICANT CHANGE UP (ref 3.5–5.3)
PROT SERPL-MCNC: 6.7 G/DL — SIGNIFICANT CHANGE UP (ref 6–8.3)
PROT SERPL-MCNC: 6.7 G/DL — SIGNIFICANT CHANGE UP (ref 6–8.3)
PROTHROM AB SERPL-ACNC: 12.2 SEC — SIGNIFICANT CHANGE UP (ref 9.5–13)
PROTHROM AB SERPL-ACNC: 12.2 SEC — SIGNIFICANT CHANGE UP (ref 9.5–13)
RBC # BLD: 3.63 M/UL — LOW (ref 3.8–5.2)
RBC # BLD: 3.63 M/UL — LOW (ref 3.8–5.2)
RBC # FLD: 14.1 % — SIGNIFICANT CHANGE UP (ref 10.3–14.5)
RBC # FLD: 14.1 % — SIGNIFICANT CHANGE UP (ref 10.3–14.5)
SODIUM SERPL-SCNC: 141 MMOL/L — SIGNIFICANT CHANGE UP (ref 135–145)
SODIUM SERPL-SCNC: 141 MMOL/L — SIGNIFICANT CHANGE UP (ref 135–145)
WBC # BLD: 11.5 K/UL — HIGH (ref 3.8–10.5)
WBC # BLD: 11.5 K/UL — HIGH (ref 3.8–10.5)
WBC # FLD AUTO: 11.5 K/UL — HIGH (ref 3.8–10.5)
WBC # FLD AUTO: 11.5 K/UL — HIGH (ref 3.8–10.5)

## 2023-11-14 PROCEDURE — 73590 X-RAY EXAM OF LOWER LEG: CPT | Mod: 26,RT

## 2023-11-14 PROCEDURE — 76377 3D RENDER W/INTRP POSTPROCES: CPT | Mod: 26

## 2023-11-14 PROCEDURE — 70450 CT HEAD/BRAIN W/O DYE: CPT | Mod: 26,MG

## 2023-11-14 PROCEDURE — 72125 CT NECK SPINE W/O DYE: CPT | Mod: 26,MA

## 2023-11-14 PROCEDURE — 73552 X-RAY EXAM OF FEMUR 2/>: CPT | Mod: 26,RT

## 2023-11-14 PROCEDURE — 71045 X-RAY EXAM CHEST 1 VIEW: CPT | Mod: 26

## 2023-11-14 PROCEDURE — 73562 X-RAY EXAM OF KNEE 3: CPT | Mod: 26,RT

## 2023-11-14 PROCEDURE — 99285 EMERGENCY DEPT VISIT HI MDM: CPT | Mod: GC

## 2023-11-14 PROCEDURE — 73700 CT LOWER EXTREMITY W/O DYE: CPT | Mod: 26,RT,MG

## 2023-11-14 PROCEDURE — 73502 X-RAY EXAM HIP UNI 2-3 VIEWS: CPT | Mod: 26,RT

## 2023-11-14 PROCEDURE — G1004: CPT

## 2023-11-14 RX ORDER — SENNA PLUS 8.6 MG/1
2 TABLET ORAL AT BEDTIME
Refills: 0 | Status: DISCONTINUED | OUTPATIENT
Start: 2023-11-14 | End: 2023-11-15

## 2023-11-14 RX ORDER — LOSARTAN POTASSIUM 100 MG/1
50 TABLET, FILM COATED ORAL DAILY
Refills: 0 | Status: DISCONTINUED | OUTPATIENT
Start: 2023-11-14 | End: 2023-11-15

## 2023-11-14 RX ORDER — QUETIAPINE FUMARATE 200 MG/1
150 TABLET, FILM COATED ORAL AT BEDTIME
Refills: 0 | Status: DISCONTINUED | OUTPATIENT
Start: 2023-11-15 | End: 2023-11-15

## 2023-11-14 RX ORDER — OXYCODONE HYDROCHLORIDE 5 MG/1
5 TABLET ORAL EVERY 4 HOURS
Refills: 0 | Status: DISCONTINUED | OUTPATIENT
Start: 2023-11-14 | End: 2023-11-15

## 2023-11-14 RX ORDER — ACETAMINOPHEN 500 MG
1000 TABLET ORAL ONCE
Refills: 0 | Status: COMPLETED | OUTPATIENT
Start: 2023-11-14 | End: 2023-11-14

## 2023-11-14 RX ORDER — FLUOXETINE HCL 10 MG
40 CAPSULE ORAL DAILY
Refills: 0 | Status: DISCONTINUED | OUTPATIENT
Start: 2023-11-14 | End: 2023-11-15

## 2023-11-14 RX ORDER — ACETAMINOPHEN 500 MG
975 TABLET ORAL EVERY 8 HOURS
Refills: 0 | Status: DISCONTINUED | OUTPATIENT
Start: 2023-11-14 | End: 2023-11-15

## 2023-11-14 RX ORDER — QUETIAPINE FUMARATE 200 MG/1
100 TABLET, FILM COATED ORAL ONCE
Refills: 0 | Status: COMPLETED | OUTPATIENT
Start: 2023-11-14 | End: 2023-11-14

## 2023-11-14 RX ORDER — ONDANSETRON 8 MG/1
4 TABLET, FILM COATED ORAL EVERY 12 HOURS
Refills: 0 | Status: DISCONTINUED | OUTPATIENT
Start: 2023-11-14 | End: 2023-11-15

## 2023-11-14 RX ORDER — DONEPEZIL HYDROCHLORIDE 10 MG/1
10 TABLET, FILM COATED ORAL DAILY
Refills: 0 | Status: DISCONTINUED | OUTPATIENT
Start: 2023-11-14 | End: 2023-11-15

## 2023-11-14 RX ORDER — MORPHINE SULFATE 50 MG/1
4 CAPSULE, EXTENDED RELEASE ORAL ONCE
Refills: 0 | Status: DISCONTINUED | OUTPATIENT
Start: 2023-11-14 | End: 2023-11-14

## 2023-11-14 RX ORDER — MAGNESIUM HYDROXIDE 400 MG/1
30 TABLET, CHEWABLE ORAL DAILY
Refills: 0 | Status: DISCONTINUED | OUTPATIENT
Start: 2023-11-14 | End: 2023-11-15

## 2023-11-14 RX ORDER — DONEPEZIL HYDROCHLORIDE 10 MG/1
10 TABLET, FILM COATED ORAL AT BEDTIME
Refills: 0 | Status: DISCONTINUED | OUTPATIENT
Start: 2023-11-14 | End: 2023-11-15

## 2023-11-14 RX ORDER — SODIUM CHLORIDE 9 MG/ML
1000 INJECTION, SOLUTION INTRAVENOUS
Refills: 0 | Status: DISCONTINUED | OUTPATIENT
Start: 2023-11-14 | End: 2023-11-15

## 2023-11-14 RX ORDER — MEMANTINE HYDROCHLORIDE 10 MG/1
10 TABLET ORAL DAILY
Refills: 0 | Status: DISCONTINUED | OUTPATIENT
Start: 2023-11-14 | End: 2023-11-15

## 2023-11-14 RX ORDER — OXYCODONE HYDROCHLORIDE 5 MG/1
2.5 TABLET ORAL EVERY 4 HOURS
Refills: 0 | Status: DISCONTINUED | OUTPATIENT
Start: 2023-11-14 | End: 2023-11-15

## 2023-11-14 RX ADMIN — SENNA PLUS 2 TABLET(S): 8.6 TABLET ORAL at 21:01

## 2023-11-14 RX ADMIN — MORPHINE SULFATE 4 MILLIGRAM(S): 50 CAPSULE, EXTENDED RELEASE ORAL at 16:00

## 2023-11-14 RX ADMIN — OXYCODONE HYDROCHLORIDE 5 MILLIGRAM(S): 5 TABLET ORAL at 21:00

## 2023-11-14 RX ADMIN — Medication 975 MILLIGRAM(S): at 21:59

## 2023-11-14 RX ADMIN — Medication 975 MILLIGRAM(S): at 21:01

## 2023-11-14 RX ADMIN — QUETIAPINE FUMARATE 100 MILLIGRAM(S): 200 TABLET, FILM COATED ORAL at 21:59

## 2023-11-14 RX ADMIN — MORPHINE SULFATE 4 MILLIGRAM(S): 50 CAPSULE, EXTENDED RELEASE ORAL at 19:46

## 2023-11-14 RX ADMIN — Medication 400 MILLIGRAM(S): at 14:15

## 2023-11-14 RX ADMIN — DONEPEZIL HYDROCHLORIDE 10 MILLIGRAM(S): 10 TABLET, FILM COATED ORAL at 21:01

## 2023-11-14 RX ADMIN — Medication 1000 MILLIGRAM(S): at 19:46

## 2023-11-14 RX ADMIN — OXYCODONE HYDROCHLORIDE 5 MILLIGRAM(S): 5 TABLET ORAL at 21:59

## 2023-11-14 NOTE — ED PROVIDER NOTE - WR ORDER STATUS 4
Impression: S/P CE/Standard IOL SA60WF +23.50 OS - 7 Days. Presence of intraocular lens  Z96.1. Condition is improving - Plan: RTC in 3 weeks for PO3 After PO3 pt to return to Dr. Nitesh Espino for refraction OD:
--Taper Prednisolone Acetate TID x 2 days, BID x 1wk, QD x 1wk, then d/c
OS:
--Discontinue Ofloxacin 0.3%
--Taper Prednisolone acetate 1% QID x 1 wk, TID x 1 wk, BID x 1wk, QD x 1wk, then d/c
Resulted

## 2023-11-14 NOTE — CONSULT NOTE ADULT - PROBLEM SELECTOR RECOMMENDATION 3
continue memantine, donepezil and Seroquel  increase Seroquel dose if needed for increased agitation  continue to reorient Patient as needed

## 2023-11-14 NOTE — ED PROVIDER NOTE - PHYSICAL EXAMINATION
Likely source is lymphoma, pneumonia, pleural effusion, compressive atelectasis  BiPAP PRN  S/p thoracentesis with drainage of 2 liters of exudative fluid on 9/27  Radiation initiated 9/26 will hold off for now given clinical improvement with thoracentesis   Patient DNR  thora cx pending. FLOW positive   GENERAL: NAD  HEENT:  Atraumatic  CHEST/LUNG: Chest rise equal bilaterally  HEART: Regular rate and rhythm  ABDOMEN: Soft, Nontender, Nondistended  EXTREMITIES:  Extremities warm  PSYCH: A&Ox3  SKIN: No obvious rashes or lesions  NEUROLOGY: strength and sensation intact in all extremities. CN 2 - 12 intact. Unable to ambulate

## 2023-11-14 NOTE — PATIENT PROFILE ADULT - NSPROPTRIGHTSUPPORTPERSON_GEN_A_NUR
Patient Name: Omar Amezquita  Specialist or PCP: John    Symptoms: Severe diaper rash for 2 days    Best Availability: Any  Callback Number: [phone]    Thank you,  Teena Contreras     declines

## 2023-11-14 NOTE — H&P ADULT - HISTORY OF PRESENT ILLNESS
Orthopaedic Surgery Consult Note    Chief Complaint:    HPI:  89 y/o female w/ PMH dementia, depression, anxiety, HTN, HLD w/ PSH b/l TKA w Dr. Baker in 2010's and R IMN w Dr. Flood in 2000's s/p fall this AM. Pt was walking out of the restroom, felt her leg give out, and sat down. Pt twisted her right leg standing up, causing pain. Pt was diagnosed w/ a right periprsothetic tka fracture by PCP XRs, prompting ED arrival.    ROS: As documented in HPI, otherwise negative.    PAST MEDICAL & SURGICAL HISTORY:    [] No significant past history as reviewed with the patient and family    MEDICATIONS  (STANDING):  acetaminophen     Tablet .. 975 milliGRAM(s) Oral every 8 hours  senna 2 Tablet(s) Oral at bedtime    MEDICATIONS  (PRN):  magnesium hydroxide Suspension 30 milliLiter(s) Oral daily PRN Constipation  oxyCODONE    IR 5 milliGRAM(s) Oral every 4 hours PRN Severe Pain (7 - 10)  oxyCODONE    IR 2.5 milliGRAM(s) Oral every 4 hours PRN Moderate Pain (4 - 6)    Allergies    No Known Allergies    Intolerances        Vital Signs Last 24 Hrs  T(C): 36.8 (14 Nov 2023 11:03), Max: 36.8 (14 Nov 2023 11:03)  T(F): 98.2 (14 Nov 2023 11:03), Max: 98.2 (14 Nov 2023 11:03)  HR: 90 (14 Nov 2023 11:03) (90 - 90)  BP: 111/73 (14 Nov 2023 11:03) (111/73 - 111/73)  BP(mean): --  RR: 14 (14 Nov 2023 11:03) (14 - 14)  SpO2: 98% (14 Nov 2023 11:03) (98% - 98%)    Parameters below as of 14 Nov 2023 11:03  Patient On (Oxygen Delivery Method): room air          PHYSICAL EXAM:  TTP over the R knee  Incision well healed  Limited ROM due to pain  NVI, 2+ DP pulse  Compartments soft                            12.0   11.50 )-----------( 207      ( 14 Nov 2023 14:42 )             36.0     11-14    141  |  102  |  15  ----------------------------<  167<H>  4.0   |  21<L>  |  0.87    Ca    9.5      14 Nov 2023 14:42    TPro  6.7  /  Alb  3.6  /  TBili  0.7  /  DBili  x   /  AST  25  /  ALT  13  /  AlkPhos  68  11-14    PT/INR - ( 14 Nov 2023 14:42 )   PT: 12.2 sec;   INR: 1.17 ratio         PTT - ( 14 Nov 2023 14:42 )  PTT:27.4 sec  Urinalysis Basic - ( 14 Nov 2023 14:42 )    Color: x / Appearance: x / SG: x / pH: x  Gluc: 167 mg/dL / Ketone: x  / Bili: x / Urobili: x   Blood: x / Protein: x / Nitrite: x   Leuk Esterase: x / RBC: x / WBC x   Sq Epi: x / Non Sq Epi: x / Bacteria: x      88y Female w R periprosthetic fx around TKA    - Plan for OR 11/15  - Preop labs  - NPO MN w IV fluids  - NWB RLE in [facility]  - admit to Dr. Monsivais

## 2023-11-14 NOTE — ED ADULT NURSE NOTE - OBJECTIVE STATEMENT
89 y/o F with  PMHx dementia, HTN, depression and hip/knee replacement BIBEMS s/p fall at home this morning. Pt states she was walking to the bathroom when she felt "her leg gave out on her". A&Ox4. Denies HA, dizziness, blurry vision. Respirations spontaneous and unlabored. Denies SOB, dyspnea, cough, CP, palpitations. EKG done. On CM, NSR. Denies abd pain, N/V/D/C. Abd soft NT/ND. LMP/BM. Denies urinary symptoms. Denies fever, chills. No sick contacts. Skin intact, warm, dry, normal for race. G L/R . Ambulates at baseline.       89 y/o female w/ PMH dementia, depression, anxiety, HTN, HLD w/ PSH knee/hip replacement c/o fall this AM. Pt was walking out of the restroom, felt her leg give out, and sat down. Pt twisted her right leg standing up, causing pain. Pt was diagnosed w/ a right femur fracture by PCP XRs, prompting ED arrival. Pt is otherwise asymptomatic. Received 100mcg of fentanyl at 10:45am by EMS. Pt admits to lightheadedness and nausea, otherwise asymptomatic. Denies fevers, chills, vomiting, chest pain, SOB, abdominal pain, dysuria, hematuria. 89 y/o F with  PMHx dementia, HTN, depression and hip/knee replacement BIBEMS s/p fall at home this morning. Pt states she was walking to the bathroom when she felt "her leg gave out on her".  pt also reports she twisted her right leg when being assisted to stand up and she reports hearing a "pop", causing her pain. pt was taken to her pcp office where she had an x-ray done and dx with  right femur fracture. EMS placed 20G IV on left hand and giving her 100 of fentanyl prior to arrival. pt is A&Ox3.  Denies HA, dizziness, blurry vision. Respirations spontaneous and unlabored on room air. Denies SOB, dyspnea, cough, CP, palpitations. Denies abd pain, N/V/D/C. Abd soft NT/ND.  Denies urinary symptoms. Denies fever, chills. No sick contacts. Skin intact, warm, dry, normal for race.

## 2023-11-14 NOTE — CONSULT NOTE ADULT - PROBLEM SELECTOR RECOMMENDATION 9
Patient scheduled for an Open reduction and internal fixation of the right femur  No contraindication to scheduled procedure  NPO after MN  DVT and GI prophylaxis as per ortho

## 2023-11-14 NOTE — ED PROVIDER NOTE - OBJECTIVE STATEMENT
87 y/o female w/ PMH dementia, depression, anxiety, HTN, HLD w/ PSH knee/hip replacement c/o fall this AM. Pt was walking out of the restroom, felt her leg give out, and sat down. Pt twisted her right leg standing up, causing pain. Pt was diagnosed w/ a right femur fracture by PCP XRs, prompting ED arrival. Pt is otherwise asymptomatic. Received 100mcg of fentanyl at 10:45am by EMS. Pt admits to lightheadedness and nausea, otherwise asymptomatic. Denies fevers, chills, vomiting, chest pain, SOB, abdominal pain, dysuria, hematuria.

## 2023-11-14 NOTE — ED PROVIDER NOTE - CLINICAL SUMMARY MEDICAL DECISION MAKING FREE TEXT BOX
87 y/o female w/ PMH dementia, depression, anxiety, HTN, HLD w/ PSH knee/hip replacement c/o fall this AM. Pt was walking out of the restroom, felt her leg give out, and sat down. Pt twisted her right leg standing up, causing pain. Pt was diagnosed w/ a right femur fracture by PCP XRs, prompting ED arrival. Pt is otherwise asymptomatic. Received 100mcg of fentanyl at 10:45am by EMS. Pt admits to lightheadedness and nausea, otherwise asymptomatic. Denies fevers, chills, vomiting, chest pain, SOB, abdominal pain, dysuria, hematuria. benign physical examination. Pt was diagnosed w/ a right femur fx. Will obtain preop labs, CTH for fall, CT femur for pre-op planning, ortho consult and reassess w/ admission for surgery.     Ortho: Godwin Monsivais MD

## 2023-11-14 NOTE — PATIENT PROFILE ADULT - FALL HARM RISK - PATIENT NEEDS ASSISTANCE
Home Care Instructions                                                                                                                Camilla Recinos   MRN: [id number]    Department:  [facility], Emergency Dept   Date of Visit:  7/14/2017            [facility], Emergency Dept    [address] [address]    Phone:  [phone]      You were seen by     Samson Villavicencio M.D.      Your Diagnosis Was     Less than 8 weeks gestation of pregnancy     Z3A.01       These are the medications you received during your hospitalization from 07/14/2017 1221 to 07/14/2017 1542     Date/Time Order Dose Route Action    07/14/2017 1310 ondansetron (ZOFRAN ODT) dispertab 4 mg 4 mg Oral Given      Follow-up Information     1. Follow up with [facility], Emergency Dept.    Specialty:  Emergency Medicine    Why:  If symptoms worsen    Contact information    [address] [address] [phone]        2. Schedule an appointment as soon as possible for a visit with [facility], M.D..    Specialty:  OB/Gyn    Contact information    [address] [place] [address] [phone]        Medication Information     Review all of your home medications and newly ordered medications with your primary doctor and/or pharmacist as soon as possible. Follow medication instructions as directed by your doctor and/or pharmacist.     Please keep your complete medication list with you and share with your physician. Update the information when medications are discontinued, doses are changed, or new medications (including over-the-counter products) are added; and carry medication information at all times in the event of emergency situations.               Medication List      START taking these medications        Instructions    Morning Afternoon Evening Bedtime    ondansetron 4 MG Tbdp   Last time this was given:  4 mg on 7/14/2017  1:10 PM   Commonly known as:   ZOFRAN ODT        Take 1 Tab by mouth every 8 hours as needed for Nausea/Vomiting.   Dose:  4 mg                             Where to Get Your Medications      You can get these medications from any pharmacy     Bring a paper prescription for each of these medications    - ondansetron 4 MG Tbdp            Procedures and tests performed during your visit     HCG QUANTITATIVE SERUM    NURSING COMMUNICATION    POC UA    POC URINE PREGNANCY    RH TYPE FOR RHOGAM FROM E.D.    US-OB PELVIS TRANSVAGINAL        Discharge Instructions       Pregnancy  If you are planning on getting pregnant, it is a good idea to make a preconception appointment with your caregiver to discuss having a healthy lifestyle before getting pregnant. This includes diet, weight, exercise, taking prenatal vitamins (especially folic acid, which helps prevent brain and spinal cord defects), avoiding alcohol, smoking and illegal drugs, medical problems (diabetes, convulsions), family history of genetic problems, working conditions, and immunizations. It is better to have knowledge of these things and do something about them before getting pregnant.  During your pregnancy, it is important to follow certain guidelines in order to have a healthy baby. It is very important to get good prenatal care and follow your caregiver's instructions. Prenatal care includes all the medical care you receive before your baby's birth. This helps to prevent problems during the pregnancy and childbirth.  HOME CARE INSTRUCTIONS   · Start your prenatal visits by the 12th week of pregnancy or earlier, if possible. At first, appointments are usually scheduled monthly. They become more frequent in the last 2 months before delivery. It is important that you keep your caregiver's appointments and follow your caregiver's instructions regarding medication use, exercise, and diet.  · During pregnancy, you are providing food for you and your baby. Eat a regular, well-balanced diet.  Choose foods such as meat, fish, milk and other dairy products, vegetables, fruits, whole-grain breads and cereals. Your caregiver will inform you of the ideal weight gain depending on your current height and weight. Drink lots of liquids. Try to drink 8 glasses of water a day.  · Alcohol is associated with a number of birth defects including fetal alcohol syndrome. It is best to avoid alcohol completely. Smoking will cause low birth rate and prematurity. Use of alcohol and nicotine during your pregnancy also increases the chances that your child will be chemically dependent later in their life and may contribute to SIDS (Sudden Infant Death Syndrome).  · Do not use illegal drugs.  · Only take prescription or over-the-counter medications that are recommended by your caregiver. Other medications can cause genetic and physical problems in the baby.  · Morning sickness can often be helped by keeping soda crackers at the bedside. Eat a few before getting up in the morning.  · A sexual relationship may be continued until near the end of pregnancy if there are no other problems such as early (premature) leaking of amniotic fluid from the membranes, vaginal bleeding, painful intercourse or belly (abdominal) pain.  · Exercise regularly. Check with your caregiver if you are unsure of the safety of some of your exercises.  · Do not use hot tubs, steam rooms or saunas. These increase the risk of fainting and hurting yourself and the baby. Swimming is OK for exercise. Get plenty of rest, including afternoon naps when possible, especially in the third trimester.  · Avoid toxic odors and chemicals.  · Do not wear high heels. They may cause you to lose your balance and fall.  · Do not lift over 5 pounds. If you do lift anything, lift with your legs and thighs, not your back.  · Avoid long trips, especially in the third trimester.  · If you have to travel out of the city or state, take a copy of your medical records with you.  SEEK  IMMEDIATE MEDICAL CARE IF:   · You develop an unexplained oral temperature above 102° F (38.9° C), or as your caregiver suggests.  · You have leaking of fluid from the vagina. If leaking membranes are suspected, take your temperature and inform your caregiver of this when you call.  · There is vaginal spotting or bleeding. Notify your caregiver of the amount and how many pads are used.  · You continue to feel sick to your stomach (nauseous) and have no relief from remedies suggested, or you throw up (vomit) blood or coffee ground like materials.  · You develop upper abdominal pain.  · You have round ligament discomfort in the lower abdominal area. This still must be evaluated by your caregiver.  · You feel contractions of the uterus.  · You do not feel the baby move, or there is less movement than before.  · You have painful urination.  · You have abnormal vaginal discharge.  · You have persistent diarrhea.  · You get a severe headache.  · You have problems with your vision.  · You develop muscle weakness.  · You feel dizzy and faint.  · You develop shortness of breath.  · You develop chest pain.  · You have back pain that travels down to your leg and feet.  · You feel irregular or a very fast heartbeat.  · You develop excessive weight gain in a short period of time (5 pounds in 3 to 5 days).  · You are involved in a domestic violence situation.  Document Released: 12/18/2006 Document Revised: 06/18/2013 Document Reviewed: 06/11/2010  ExitCare® Patient Information ©2014 [employer].            Patient Information     Patient Information    Following emergency treatment: all patient requiring follow-up care must return either to a private physician or a clinic if your condition worsens before you are able to obtain further medical attention, please return to the emergency room.     Billing Information    At [facility], we work to make the billing process streamlined for our patients.  Our Representatives are  here to answer any questions you may have regarding your hospital bill.  If you have insurance coverage and have supplied your insurance information to us, we will submit a claim to your insurer on your behalf.  Should you have any questions regarding your bill, we can be reached online or by phone as follows:  Online: You are able pay your bills online or live chat with our representatives about any billing questions you may have. We are here to help Monday - Friday from 8:00am to 7:30pm and 9:00am - 12:00pm on Saturdays.  Please visit [email or web address]  for more information.   Phone:  [phone] or [phone]    Please note that your emergency physician, surgeon, pathologist, radiologist, anesthesiologist, and other specialists are not employed by [facility] and will therefore bill separately for their services.  Please contact them directly for any questions concerning their bills at the numbers below:     Emergency Physician Services:  [phone]  [facility]:  [phone]  [facility]:  [phone]  [facility]:  [phone]    1. Your final bill may vary from the amount quoted upon discharge if all procedures are not complete at that time, or if your doctor has additional procedures of which we are not aware. You will receive an additional bill if you return to the Emergency Department at [facility] for suture removal regardless of the facility of which the sutures were placed.     2. Please arrange for settlement of this account at the emergency registration.    3. All self-pay accounts are due in full at the time of treatment.  If you are unable to meet this obligation then payment is expected within 4-5 days.     4. If you have had radiology studies (CT, X-ray, Ultrasound, MRI), you have received a preliminary result during your emergency department visit. Please contact the radiology department [phone] to  receive a copy of your final result. Please discuss the Final result with your primary physician or with the follow up physician provided.     Crisis Hotline:  [place] Crisis Hotline:  2-539-ONVPWIH or 1-202.623.2545  [place] Crisis Hotline:    1-522.203.2871 or 556-069-1090         ED Discharge Follow Up Questions    1. In order to provide you with very good care, we would like to follow up with a phone call in the next few days.  May we have your permission to contact you?     YES /  NO    2. What is the best phone number to call you? (       )_____-__________    3. What is the best time to call you?      Morning  /  Afternoon  /  Evening                   Patient Signature:  ____________________________________________________________    Date:  ____________________________________________________________                Standing/Walking/Toileting/Moving from bed to chair

## 2023-11-14 NOTE — ED PROVIDER NOTE - ATTENDING CONTRIBUTION TO CARE
I was the supervising attending. I have independently seen face-to-face and examined the patient. I have reviewed the history and physical and discussed the MDM with the resident, fellow, ANNETTE and/or student. I agree with the assessment and plan as presented unless otherwise documented as follows:    88F hx dementia, depression/anxiety, HTN, HLD, prior B/L knee and R hip replacement, presenting with concern for femur fracture. Patient was in the shower this AM when she lost her balance. Her aide was able to catch her, but the patient twisted her R leg at the knee. Patient was evaluated by her outpatient orthopedist and had XRs c/f femur fracture so was advised to come to the ED for operation tomorrow. Exam notable for tenderness over R mid-distal femur, extremities WWP. Will obtain pre-op labs, XR/CT imaging, consult ortho. -Gabriela Rosas MD (Attending)

## 2023-11-14 NOTE — PATIENT PROFILE ADULT - FALL HARM RISK - RISK INTERVENTIONS

## 2023-11-15 ENCOUNTER — TRANSCRIPTION ENCOUNTER (OUTPATIENT)
Age: 88
End: 2023-11-15

## 2023-11-15 LAB
ANION GAP SERPL CALC-SCNC: 10 MMOL/L — SIGNIFICANT CHANGE UP (ref 5–17)
ANION GAP SERPL CALC-SCNC: 10 MMOL/L — SIGNIFICANT CHANGE UP (ref 5–17)
ANION GAP SERPL CALC-SCNC: 12 MMOL/L — SIGNIFICANT CHANGE UP (ref 5–17)
ANION GAP SERPL CALC-SCNC: 12 MMOL/L — SIGNIFICANT CHANGE UP (ref 5–17)
APTT BLD: 25 SEC — SIGNIFICANT CHANGE UP (ref 24.5–35.6)
APTT BLD: 25 SEC — SIGNIFICANT CHANGE UP (ref 24.5–35.6)
BLD GP AB SCN SERPL QL: NEGATIVE — SIGNIFICANT CHANGE UP
BLD GP AB SCN SERPL QL: NEGATIVE — SIGNIFICANT CHANGE UP
BUN SERPL-MCNC: 23 MG/DL — SIGNIFICANT CHANGE UP (ref 7–23)
BUN SERPL-MCNC: 23 MG/DL — SIGNIFICANT CHANGE UP (ref 7–23)
BUN SERPL-MCNC: 24 MG/DL — HIGH (ref 7–23)
BUN SERPL-MCNC: 24 MG/DL — HIGH (ref 7–23)
CALCIUM SERPL-MCNC: 8.3 MG/DL — LOW (ref 8.4–10.5)
CALCIUM SERPL-MCNC: 8.3 MG/DL — LOW (ref 8.4–10.5)
CALCIUM SERPL-MCNC: 9 MG/DL — SIGNIFICANT CHANGE UP (ref 8.4–10.5)
CALCIUM SERPL-MCNC: 9 MG/DL — SIGNIFICANT CHANGE UP (ref 8.4–10.5)
CHLORIDE SERPL-SCNC: 102 MMOL/L — SIGNIFICANT CHANGE UP (ref 96–108)
CHLORIDE SERPL-SCNC: 102 MMOL/L — SIGNIFICANT CHANGE UP (ref 96–108)
CHLORIDE SERPL-SCNC: 103 MMOL/L — SIGNIFICANT CHANGE UP (ref 96–108)
CHLORIDE SERPL-SCNC: 103 MMOL/L — SIGNIFICANT CHANGE UP (ref 96–108)
CO2 SERPL-SCNC: 24 MMOL/L — SIGNIFICANT CHANGE UP (ref 22–31)
CO2 SERPL-SCNC: 24 MMOL/L — SIGNIFICANT CHANGE UP (ref 22–31)
CO2 SERPL-SCNC: 25 MMOL/L — SIGNIFICANT CHANGE UP (ref 22–31)
CO2 SERPL-SCNC: 25 MMOL/L — SIGNIFICANT CHANGE UP (ref 22–31)
CREAT SERPL-MCNC: 1.34 MG/DL — HIGH (ref 0.5–1.3)
CREAT SERPL-MCNC: 1.34 MG/DL — HIGH (ref 0.5–1.3)
CREAT SERPL-MCNC: 1.52 MG/DL — HIGH (ref 0.5–1.3)
CREAT SERPL-MCNC: 1.52 MG/DL — HIGH (ref 0.5–1.3)
EGFR: 33 ML/MIN/1.73M2 — LOW
EGFR: 33 ML/MIN/1.73M2 — LOW
EGFR: 39 ML/MIN/1.73M2 — LOW
EGFR: 39 ML/MIN/1.73M2 — LOW
GLUCOSE SERPL-MCNC: 119 MG/DL — HIGH (ref 70–99)
GLUCOSE SERPL-MCNC: 119 MG/DL — HIGH (ref 70–99)
GLUCOSE SERPL-MCNC: 125 MG/DL — HIGH (ref 70–99)
GLUCOSE SERPL-MCNC: 125 MG/DL — HIGH (ref 70–99)
HCT VFR BLD CALC: 27.1 % — LOW (ref 34.5–45)
HCT VFR BLD CALC: 27.1 % — LOW (ref 34.5–45)
HCT VFR BLD CALC: 29.3 % — LOW (ref 34.5–45)
HCT VFR BLD CALC: 29.3 % — LOW (ref 34.5–45)
HGB BLD-MCNC: 9 G/DL — LOW (ref 11.5–15.5)
HGB BLD-MCNC: 9 G/DL — LOW (ref 11.5–15.5)
HGB BLD-MCNC: 9.7 G/DL — LOW (ref 11.5–15.5)
HGB BLD-MCNC: 9.7 G/DL — LOW (ref 11.5–15.5)
INR BLD: 1.17 RATIO — SIGNIFICANT CHANGE UP (ref 0.85–1.18)
INR BLD: 1.17 RATIO — SIGNIFICANT CHANGE UP (ref 0.85–1.18)
MCHC RBC-ENTMCNC: 32.9 PG — SIGNIFICANT CHANGE UP (ref 27–34)
MCHC RBC-ENTMCNC: 32.9 PG — SIGNIFICANT CHANGE UP (ref 27–34)
MCHC RBC-ENTMCNC: 33.1 GM/DL — SIGNIFICANT CHANGE UP (ref 32–36)
MCHC RBC-ENTMCNC: 33.1 GM/DL — SIGNIFICANT CHANGE UP (ref 32–36)
MCHC RBC-ENTMCNC: 33.2 GM/DL — SIGNIFICANT CHANGE UP (ref 32–36)
MCHC RBC-ENTMCNC: 33.2 GM/DL — SIGNIFICANT CHANGE UP (ref 32–36)
MCHC RBC-ENTMCNC: 33.3 PG — SIGNIFICANT CHANGE UP (ref 27–34)
MCHC RBC-ENTMCNC: 33.3 PG — SIGNIFICANT CHANGE UP (ref 27–34)
MCV RBC AUTO: 100.4 FL — HIGH (ref 80–100)
MCV RBC AUTO: 100.4 FL — HIGH (ref 80–100)
MCV RBC AUTO: 99.3 FL — SIGNIFICANT CHANGE UP (ref 80–100)
MCV RBC AUTO: 99.3 FL — SIGNIFICANT CHANGE UP (ref 80–100)
NRBC # BLD: 0 /100 WBCS — SIGNIFICANT CHANGE UP (ref 0–0)
PLATELET # BLD AUTO: 165 K/UL — SIGNIFICANT CHANGE UP (ref 150–400)
PLATELET # BLD AUTO: 165 K/UL — SIGNIFICANT CHANGE UP (ref 150–400)
PLATELET # BLD AUTO: 169 K/UL — SIGNIFICANT CHANGE UP (ref 150–400)
PLATELET # BLD AUTO: 169 K/UL — SIGNIFICANT CHANGE UP (ref 150–400)
POTASSIUM SERPL-MCNC: 4.1 MMOL/L — SIGNIFICANT CHANGE UP (ref 3.5–5.3)
POTASSIUM SERPL-MCNC: 4.1 MMOL/L — SIGNIFICANT CHANGE UP (ref 3.5–5.3)
POTASSIUM SERPL-MCNC: 4.2 MMOL/L — SIGNIFICANT CHANGE UP (ref 3.5–5.3)
POTASSIUM SERPL-MCNC: 4.2 MMOL/L — SIGNIFICANT CHANGE UP (ref 3.5–5.3)
POTASSIUM SERPL-SCNC: 4.1 MMOL/L — SIGNIFICANT CHANGE UP (ref 3.5–5.3)
POTASSIUM SERPL-SCNC: 4.1 MMOL/L — SIGNIFICANT CHANGE UP (ref 3.5–5.3)
POTASSIUM SERPL-SCNC: 4.2 MMOL/L — SIGNIFICANT CHANGE UP (ref 3.5–5.3)
POTASSIUM SERPL-SCNC: 4.2 MMOL/L — SIGNIFICANT CHANGE UP (ref 3.5–5.3)
PROTHROM AB SERPL-ACNC: 12.2 SEC — SIGNIFICANT CHANGE UP (ref 9.5–13)
PROTHROM AB SERPL-ACNC: 12.2 SEC — SIGNIFICANT CHANGE UP (ref 9.5–13)
RBC # BLD: 2.7 M/UL — LOW (ref 3.8–5.2)
RBC # BLD: 2.7 M/UL — LOW (ref 3.8–5.2)
RBC # BLD: 2.95 M/UL — LOW (ref 3.8–5.2)
RBC # BLD: 2.95 M/UL — LOW (ref 3.8–5.2)
RBC # FLD: 14.2 % — SIGNIFICANT CHANGE UP (ref 10.3–14.5)
RBC # FLD: 14.2 % — SIGNIFICANT CHANGE UP (ref 10.3–14.5)
RBC # FLD: 14.8 % — HIGH (ref 10.3–14.5)
RBC # FLD: 14.8 % — HIGH (ref 10.3–14.5)
RH IG SCN BLD-IMP: POSITIVE — SIGNIFICANT CHANGE UP
RH IG SCN BLD-IMP: POSITIVE — SIGNIFICANT CHANGE UP
SODIUM SERPL-SCNC: 137 MMOL/L — SIGNIFICANT CHANGE UP (ref 135–145)
SODIUM SERPL-SCNC: 137 MMOL/L — SIGNIFICANT CHANGE UP (ref 135–145)
SODIUM SERPL-SCNC: 139 MMOL/L — SIGNIFICANT CHANGE UP (ref 135–145)
SODIUM SERPL-SCNC: 139 MMOL/L — SIGNIFICANT CHANGE UP (ref 135–145)
WBC # BLD: 10.29 K/UL — SIGNIFICANT CHANGE UP (ref 3.8–10.5)
WBC # BLD: 10.29 K/UL — SIGNIFICANT CHANGE UP (ref 3.8–10.5)
WBC # BLD: 12.4 K/UL — HIGH (ref 3.8–10.5)
WBC # BLD: 12.4 K/UL — HIGH (ref 3.8–10.5)
WBC # FLD AUTO: 10.29 K/UL — SIGNIFICANT CHANGE UP (ref 3.8–10.5)
WBC # FLD AUTO: 10.29 K/UL — SIGNIFICANT CHANGE UP (ref 3.8–10.5)
WBC # FLD AUTO: 12.4 K/UL — HIGH (ref 3.8–10.5)
WBC # FLD AUTO: 12.4 K/UL — HIGH (ref 3.8–10.5)

## 2023-11-15 PROCEDURE — 93010 ELECTROCARDIOGRAM REPORT: CPT

## 2023-11-15 DEVICE — SCREW CORT 4.5X38MM: Type: IMPLANTABLE DEVICE | Site: RIGHT | Status: FUNCTIONAL

## 2023-11-15 DEVICE — SCREW LOKG 5X40MM: Type: IMPLANTABLE DEVICE | Site: RIGHT | Status: FUNCTIONAL

## 2023-11-15 DEVICE — IMPLANTABLE DEVICE: Type: IMPLANTABLE DEVICE | Site: RIGHT | Status: FUNCTIONAL

## 2023-11-15 DEVICE — SCREW LOKG 5X60MM: Type: IMPLANTABLE DEVICE | Site: RIGHT | Status: FUNCTIONAL

## 2023-11-15 DEVICE — SCREW LOKG 5X32MM: Type: IMPLANTABLE DEVICE | Site: RIGHT | Status: FUNCTIONAL

## 2023-11-15 DEVICE — SCREW LOKG 5X36MM: Type: IMPLANTABLE DEVICE | Site: RIGHT | Status: FUNCTIONAL

## 2023-11-15 DEVICE — SCREW LOKG 5X70MM: Type: IMPLANTABLE DEVICE | Site: RIGHT | Status: FUNCTIONAL

## 2023-11-15 RX ORDER — ACETAMINOPHEN 500 MG
650 TABLET ORAL EVERY 4 HOURS
Refills: 0 | Status: DISCONTINUED | OUTPATIENT
Start: 2023-11-15 | End: 2023-11-15

## 2023-11-15 RX ORDER — FLUOXETINE HCL 10 MG
40 CAPSULE ORAL DAILY
Refills: 0 | Status: DISCONTINUED | OUTPATIENT
Start: 2023-11-15 | End: 2023-11-21

## 2023-11-15 RX ORDER — QUETIAPINE FUMARATE 200 MG/1
150 TABLET, FILM COATED ORAL
Refills: 0 | Status: DISCONTINUED | OUTPATIENT
Start: 2023-11-15 | End: 2023-11-21

## 2023-11-15 RX ORDER — SODIUM CHLORIDE 9 MG/ML
500 INJECTION, SOLUTION INTRAVENOUS ONCE
Refills: 0 | Status: COMPLETED | OUTPATIENT
Start: 2023-11-15 | End: 2023-11-15

## 2023-11-15 RX ORDER — ONDANSETRON 8 MG/1
4 TABLET, FILM COATED ORAL EVERY 12 HOURS
Refills: 0 | Status: DISCONTINUED | OUTPATIENT
Start: 2023-11-15 | End: 2023-11-21

## 2023-11-15 RX ORDER — LANOLIN ALCOHOL/MO/W.PET/CERES
3 CREAM (GRAM) TOPICAL AT BEDTIME
Refills: 0 | Status: DISCONTINUED | OUTPATIENT
Start: 2023-11-15 | End: 2023-11-21

## 2023-11-15 RX ORDER — OXYCODONE HYDROCHLORIDE 5 MG/1
5 TABLET ORAL EVERY 4 HOURS
Refills: 0 | Status: DISCONTINUED | OUTPATIENT
Start: 2023-11-15 | End: 2023-11-15

## 2023-11-15 RX ORDER — MEMANTINE HYDROCHLORIDE 10 MG/1
5 TABLET ORAL EVERY 12 HOURS
Refills: 0 | Status: DISCONTINUED | OUTPATIENT
Start: 2023-11-15 | End: 2023-11-21

## 2023-11-15 RX ORDER — TRAMADOL HYDROCHLORIDE 50 MG/1
25 TABLET ORAL EVERY 8 HOURS
Refills: 0 | Status: DISCONTINUED | OUTPATIENT
Start: 2023-11-15 | End: 2023-11-21

## 2023-11-15 RX ORDER — METOPROLOL TARTRATE 50 MG
5 TABLET ORAL ONCE
Refills: 0 | Status: COMPLETED | OUTPATIENT
Start: 2023-11-15 | End: 2023-11-15

## 2023-11-15 RX ORDER — ACETAMINOPHEN 500 MG
975 TABLET ORAL EVERY 8 HOURS
Refills: 0 | Status: DISCONTINUED | OUTPATIENT
Start: 2023-11-16 | End: 2023-11-21

## 2023-11-15 RX ORDER — TRAMADOL HYDROCHLORIDE 50 MG/1
50 TABLET ORAL EVERY 8 HOURS
Refills: 0 | Status: DISCONTINUED | OUTPATIENT
Start: 2023-11-15 | End: 2023-11-21

## 2023-11-15 RX ORDER — ONDANSETRON 8 MG/1
4 TABLET, FILM COATED ORAL ONCE
Refills: 0 | Status: DISCONTINUED | OUTPATIENT
Start: 2023-11-15 | End: 2023-11-15

## 2023-11-15 RX ORDER — HYDROMORPHONE HYDROCHLORIDE 2 MG/ML
0.25 INJECTION INTRAMUSCULAR; INTRAVENOUS; SUBCUTANEOUS
Refills: 0 | Status: DISCONTINUED | OUTPATIENT
Start: 2023-11-15 | End: 2023-11-15

## 2023-11-15 RX ORDER — PANTOPRAZOLE SODIUM 20 MG/1
40 TABLET, DELAYED RELEASE ORAL
Refills: 0 | Status: DISCONTINUED | OUTPATIENT
Start: 2023-11-15 | End: 2023-11-21

## 2023-11-15 RX ORDER — RIVAROXABAN 15 MG-20MG
10 KIT ORAL DAILY
Refills: 0 | Status: DISCONTINUED | OUTPATIENT
Start: 2023-11-16 | End: 2023-11-21

## 2023-11-15 RX ORDER — LOSARTAN POTASSIUM 100 MG/1
50 TABLET, FILM COATED ORAL DAILY
Refills: 0 | Status: DISCONTINUED | OUTPATIENT
Start: 2023-11-15 | End: 2023-11-21

## 2023-11-15 RX ORDER — OXYCODONE HYDROCHLORIDE 5 MG/1
2.5 TABLET ORAL EVERY 4 HOURS
Refills: 0 | Status: DISCONTINUED | OUTPATIENT
Start: 2023-11-15 | End: 2023-11-15

## 2023-11-15 RX ORDER — SODIUM CHLORIDE 9 MG/ML
1000 INJECTION, SOLUTION INTRAVENOUS
Refills: 0 | Status: DISCONTINUED | OUTPATIENT
Start: 2023-11-15 | End: 2023-11-21

## 2023-11-15 RX ORDER — TRAMADOL HYDROCHLORIDE 50 MG/1
50 TABLET ORAL EVERY 4 HOURS
Refills: 0 | Status: DISCONTINUED | OUTPATIENT
Start: 2023-11-15 | End: 2023-11-15

## 2023-11-15 RX ORDER — ATORVASTATIN CALCIUM 80 MG/1
80 TABLET, FILM COATED ORAL AT BEDTIME
Refills: 0 | Status: DISCONTINUED | OUTPATIENT
Start: 2023-11-15 | End: 2023-11-21

## 2023-11-15 RX ORDER — CLONAZEPAM 1 MG
0.25 TABLET ORAL EVERY 24 HOURS
Refills: 0 | Status: DISCONTINUED | OUTPATIENT
Start: 2023-11-16 | End: 2023-11-16

## 2023-11-15 RX ORDER — ONDANSETRON 8 MG/1
4 TABLET, FILM COATED ORAL EVERY 6 HOURS
Refills: 0 | Status: DISCONTINUED | OUTPATIENT
Start: 2023-11-15 | End: 2023-11-21

## 2023-11-15 RX ORDER — QUETIAPINE FUMARATE 200 MG/1
150 TABLET, FILM COATED ORAL EVERY 24 HOURS
Refills: 0 | Status: DISCONTINUED | OUTPATIENT
Start: 2023-11-15 | End: 2023-11-15

## 2023-11-15 RX ORDER — ACETAMINOPHEN 500 MG
1000 TABLET ORAL ONCE
Refills: 0 | Status: COMPLETED | OUTPATIENT
Start: 2023-11-15 | End: 2023-11-15

## 2023-11-15 RX ORDER — CEFAZOLIN SODIUM 1 G
2000 VIAL (EA) INJECTION EVERY 8 HOURS
Refills: 0 | Status: COMPLETED | OUTPATIENT
Start: 2023-11-15 | End: 2023-11-16

## 2023-11-15 RX ORDER — ACETAMINOPHEN 500 MG
1000 TABLET ORAL ONCE
Refills: 0 | Status: COMPLETED | OUTPATIENT
Start: 2023-11-16 | End: 2023-11-16

## 2023-11-15 RX ORDER — DONEPEZIL HYDROCHLORIDE 10 MG/1
10 TABLET, FILM COATED ORAL AT BEDTIME
Refills: 0 | Status: DISCONTINUED | OUTPATIENT
Start: 2023-11-15 | End: 2023-11-16

## 2023-11-15 RX ADMIN — Medication 5 MILLIGRAM(S): at 19:12

## 2023-11-15 RX ADMIN — Medication 400 MILLIGRAM(S): at 23:50

## 2023-11-15 RX ADMIN — DONEPEZIL HYDROCHLORIDE 10 MILLIGRAM(S): 10 TABLET, FILM COATED ORAL at 22:37

## 2023-11-15 RX ADMIN — Medication 975 MILLIGRAM(S): at 06:57

## 2023-11-15 RX ADMIN — SODIUM CHLORIDE 100 MILLILITER(S): 9 INJECTION, SOLUTION INTRAVENOUS at 20:09

## 2023-11-15 RX ADMIN — SODIUM CHLORIDE 100 MILLILITER(S): 9 INJECTION, SOLUTION INTRAVENOUS at 00:18

## 2023-11-15 RX ADMIN — Medication 100 MILLIGRAM(S): at 22:38

## 2023-11-15 RX ADMIN — OXYCODONE HYDROCHLORIDE 2.5 MILLIGRAM(S): 5 TABLET ORAL at 09:41

## 2023-11-15 RX ADMIN — Medication 975 MILLIGRAM(S): at 05:57

## 2023-11-15 RX ADMIN — OXYCODONE HYDROCHLORIDE 2.5 MILLIGRAM(S): 5 TABLET ORAL at 08:41

## 2023-11-15 RX ADMIN — HYDROMORPHONE HYDROCHLORIDE 0.25 MILLIGRAM(S): 2 INJECTION INTRAMUSCULAR; INTRAVENOUS; SUBCUTANEOUS at 15:15

## 2023-11-15 RX ADMIN — HYDROMORPHONE HYDROCHLORIDE 0.25 MILLIGRAM(S): 2 INJECTION INTRAMUSCULAR; INTRAVENOUS; SUBCUTANEOUS at 15:30

## 2023-11-15 RX ADMIN — MEMANTINE HYDROCHLORIDE 5 MILLIGRAM(S): 10 TABLET ORAL at 20:08

## 2023-11-15 RX ADMIN — QUETIAPINE FUMARATE 150 MILLIGRAM(S): 200 TABLET, FILM COATED ORAL at 20:07

## 2023-11-15 RX ADMIN — Medication 3 MILLIGRAM(S): at 22:38

## 2023-11-15 RX ADMIN — Medication 1 TABLET(S): at 20:08

## 2023-11-15 RX ADMIN — SODIUM CHLORIDE 1000 MILLILITER(S): 9 INJECTION, SOLUTION INTRAVENOUS at 07:33

## 2023-11-15 RX ADMIN — ATORVASTATIN CALCIUM 80 MILLIGRAM(S): 80 TABLET, FILM COATED ORAL at 22:35

## 2023-11-15 NOTE — PRE-ANESTHESIA EVALUATION ADULT - NSANTHPMHFT_GEN_ALL_CORE
chart and consultant notes reviewed. pt confused - a+o x 1. hx obtained from chart and daughter. per daughter, ms at/near. no hx sig cv/pulm dz - limited et with walker at baseline 2/2 ortho problems, approx 1 block. no cp, mild sob on exertion, unchanged from long term baseline. ekg noted. mica, cr .7 -> 1.5 overnight. ?possible ugi obstruction on hct, radiologist suggestion gi w/u? pt asymptomatic. d/w dr sorensen, states case is surgical emergency, cannot wait for further work-up or resolution of nephrologic or GI status.

## 2023-11-15 NOTE — PROGRESS NOTE ADULT - ASSESSMENT
89 yo woman with a hx of HTN, dementia, Anxiety and depression presents after a fall with a right femur fracture. Patient is s/p an Open reduction and internal fixation of the right femur

## 2023-11-15 NOTE — CHART NOTE - NSCHARTNOTEFT_GEN_A_CORE
Note:    While in PACU, patient noted to be in A-Fib with RVR @ 100 BPM. Patient given IV Lopressor 5mg IVP by PACU NP with (+) response. HR now in the 70-80s    O/E:    GEN: Patient in NAD, pleasant, mildly confused, (baseline)  COR: S1S2 Irreg  PULM: CTA w/o rales  /rhonc I/ wheezes  ABD: benign  Ext:  No changes   KI  ACE Dsg in tact  Proximal Dsg with mild drainage noted through CLEAR Tegaderm  n/v/i    ICU Vital Signs Last 24 Hrs  T(C): 36.6   T(F): 97.9  HR: 107 IRREG  BP: 121/89 (15 Nov 2023 19:00) (90/53 - 128/84)  RR: 18  SpO2: 91-98% (on 2 L/M nasal cannula)    EKG;  A-Fib @ 110 w/o Ectopy                          9.0    12.40 )-----------( 165      ( 15 Nov 2023 17:34 )             27.1   11-15      139  |  102  |  24<H>  ----------------------------<  125<H>  4.1   |  25  |  1.34<H>        Plan: s/p Open Reduction Internal Fixation / Surgical Repair R Periprosthetic Fracture now with episode of A-Fib    - Medicine Aware  - House Cards consulted      Patient currently stable       Transfer to Telemetry      OK to transfer back to [facility] ( Telemetry - capable - confirmed)      AM Labs: CBC, BMP, TSH, T-4      Will see patient and determine IF patient needs continuos rate control and/or any other diagnostic testing  -  Resident on call & Attending aware  - Monitor closely      ***See Above  Luis RAMIREZ  Orthopedics  B: [phone]  S: [phone] Note:    While in PACU, patient noted to be in A-Fib with RVR @ 100 BPM. Patient given IV Lopressor 5mg IVP by PACU NP with (+) response. HR now in the 70-80s    O/E:    GEN: Patient in NAD, pleasant, mildly confused, (baseline)  COR: S1S2 Irreg  PULM: CTA w/o rales  /rhonc I/ wheezes  ABD: benign  Ext:  No changes   KI  ACE Dsg in tact  Proximal Dsg with mild drainage noted through CLEAR Tegaderm  n/v/i    ICU Vital Signs Last 24 Hrs  T(C): 36.6   T(F): 97.9  HR: 107 IRREG  BP: 121/89 (15 Nov 2023 19:00) (90/53 - 128/84)  RR: 18  SpO2: 91-98% (on 2 L/M nasal cannula)    EKG;  A-Fib @ 110 w/o Ectopy                          9.0    12.40 )-----------( 165      ( 15 Nov 2023 17:34 )             27.1   11-15      139  |  102  |  24<H>  ----------------------------<  125<H>  4.1   |  25  |  1.34<H>        Plan: s/p Open Reduction Internal Fixation / Surgical Repair R Periprosthetic Fracture now with episode of A-Fib    - Medicine Aware  - House Cards consulted      Patient currently stable       Transfer to Telemetry      OK to transfer back to [address] [facility] ( Telemetry - capable - confirmed)      AM Labs: CBC, BMP, TSH, T-4      Will see patient and determine IF patient needs continuos rate control and/or any other diagnostic testing  -  Resident on call & Attending aware  - Monitor closely    Note:  PLEASE CONTACT THE ORTHO RESIDENT ON CALL PRIOR TO STARTING ANY NEW ANTI-COAGULANT THERAPY FOR APPROVAL BY THE ORTHOPEDIC SURGEON.  Beeper: [phone] or Spectra-link: [phone].      Thank You  ***See Above  Luis RAMIREZ  Orthopedics  B: [phone]  S: [phone]

## 2023-11-15 NOTE — CHART NOTE - NSCHARTNOTEFT_GEN_A_CORE
POC    Resting without complaints.   Daughter @ bedside   No Chest Pain, SOB, N/V.    T(C): 36.6 (11-15-23 @ 14:37), Max: 37.3 (11-15-23 @ 11:19)  HR: 73 (11-15-23 @ 16:00) (66 - 91)  BP: 102/56 (11-15-23 @ 16:00) (90/53 - 128/84)  RR: 17 (11-15-23 @ 16:00) (17 - 19)  SpO2: 95% (11-15-23 @ 16:00) (93% - 100%)  Wt(kg): --    Exam:  Pleasantly confused; non-agitated; (h/o Dementia)  No Acute Distress  Pulm: CTAB  Abdomen soft / benign  [place]  [n ]   EXT   RLE       Bulky Asif Dressing (s) C/D  [x ]            Knee immobilizer in tac       (+) moving all all digits; (+) DF/PF        No Sensory Deficits noted        digits warm/ well-perfused        1+ pulses      Labs: Pending      A/P: S/p ORIF, fracture, distal femur    -  Follow up PACU labs  -PT: NWB  RLE     ** cont immobilizer  -Chk AM Labs  -DVT PPx: Xarelto 10mg QD  -Pain Control PO/IV Pain Rx       ## minimize narcotics  -Continue Current Tx        ** Home Rx confirmed w/ daughter  -Dispo planning: TBD      ***See Above  uLis RAMIREZ  Orthopedics  B: [phone]  S: [phone]

## 2023-11-15 NOTE — BRIEF OPERATIVE NOTE - NSICDXBRIEFPROCEDURE_GEN_ALL_CORE_FT
Patient would like communication of their results via:        Cell Phone:   Telephone Information:   Mobile [phone]     Okay to leave a message containing results? Yes    Rafita Carmona presents with fevers, forceful uncontrolled cough, headaches caused by coughing and intermittent SOB.    Symptoms have been present for 3 days 12/25/2021.    Patient has taken albuterol, robitussin DM.     Visit Vitals  /82 (BP Location: RUE - Right upper extremity, Patient Position: Sitting, Cuff Size: Large Adult)   Pulse (!) 128 Comment: ambulatory   Temp 99.5 °F (37.5 °C) (Oral)   Resp 20   Ht 6' 3\" (1.905 m)   Wt 136.1 kg (300 lb) Comment: pt reported   SpO2 95% Comment: ambulatory: 95-96 walking   BMI 37.50 kg/m²     RN wearing gloves, mask, face shield, gown.  Patient is wearing mask.     Patient has had 2 Covid vaccinations.  Patient has not had influenza vaccination.   PROCEDURES:  ORIF, fracture, distal femur 15-Nov-2023 14:50:08  Anastacio Snyder

## 2023-11-16 DIAGNOSIS — I48.91 UNSPECIFIED ATRIAL FIBRILLATION: ICD-10-CM

## 2023-11-16 LAB
ANION GAP SERPL CALC-SCNC: 11 MMOL/L — SIGNIFICANT CHANGE UP (ref 5–17)
ANION GAP SERPL CALC-SCNC: 11 MMOL/L — SIGNIFICANT CHANGE UP (ref 5–17)
BUN SERPL-MCNC: 23 MG/DL — SIGNIFICANT CHANGE UP (ref 7–23)
BUN SERPL-MCNC: 23 MG/DL — SIGNIFICANT CHANGE UP (ref 7–23)
CALCIUM SERPL-MCNC: 8.3 MG/DL — LOW (ref 8.4–10.5)
CALCIUM SERPL-MCNC: 8.3 MG/DL — LOW (ref 8.4–10.5)
CHLORIDE SERPL-SCNC: 101 MMOL/L — SIGNIFICANT CHANGE UP (ref 96–108)
CHLORIDE SERPL-SCNC: 101 MMOL/L — SIGNIFICANT CHANGE UP (ref 96–108)
CO2 SERPL-SCNC: 23 MMOL/L — SIGNIFICANT CHANGE UP (ref 22–31)
CO2 SERPL-SCNC: 23 MMOL/L — SIGNIFICANT CHANGE UP (ref 22–31)
CREAT SERPL-MCNC: 1.09 MG/DL — SIGNIFICANT CHANGE UP (ref 0.5–1.3)
CREAT SERPL-MCNC: 1.09 MG/DL — SIGNIFICANT CHANGE UP (ref 0.5–1.3)
EGFR: 49 ML/MIN/1.73M2 — LOW
EGFR: 49 ML/MIN/1.73M2 — LOW
GLUCOSE SERPL-MCNC: 87 MG/DL — SIGNIFICANT CHANGE UP (ref 70–99)
GLUCOSE SERPL-MCNC: 87 MG/DL — SIGNIFICANT CHANGE UP (ref 70–99)
HCT VFR BLD CALC: 27.1 % — LOW (ref 34.5–45)
HCT VFR BLD CALC: 27.1 % — LOW (ref 34.5–45)
HGB BLD-MCNC: 8.5 G/DL — LOW (ref 11.5–15.5)
HGB BLD-MCNC: 8.5 G/DL — LOW (ref 11.5–15.5)
MCHC RBC-ENTMCNC: 31.4 GM/DL — LOW (ref 32–36)
MCHC RBC-ENTMCNC: 31.4 GM/DL — LOW (ref 32–36)
MCHC RBC-ENTMCNC: 33.5 PG — SIGNIFICANT CHANGE UP (ref 27–34)
MCHC RBC-ENTMCNC: 33.5 PG — SIGNIFICANT CHANGE UP (ref 27–34)
MCV RBC AUTO: 106.7 FL — HIGH (ref 80–100)
MCV RBC AUTO: 106.7 FL — HIGH (ref 80–100)
NRBC # BLD: 0 /100 WBCS — SIGNIFICANT CHANGE UP (ref 0–0)
NRBC # BLD: 0 /100 WBCS — SIGNIFICANT CHANGE UP (ref 0–0)
PLATELET # BLD AUTO: 147 K/UL — LOW (ref 150–400)
PLATELET # BLD AUTO: 147 K/UL — LOW (ref 150–400)
POTASSIUM SERPL-MCNC: 4.3 MMOL/L — SIGNIFICANT CHANGE UP (ref 3.5–5.3)
POTASSIUM SERPL-MCNC: 4.3 MMOL/L — SIGNIFICANT CHANGE UP (ref 3.5–5.3)
POTASSIUM SERPL-SCNC: 4.3 MMOL/L — SIGNIFICANT CHANGE UP (ref 3.5–5.3)
POTASSIUM SERPL-SCNC: 4.3 MMOL/L — SIGNIFICANT CHANGE UP (ref 3.5–5.3)
RBC # BLD: 2.54 M/UL — LOW (ref 3.8–5.2)
RBC # BLD: 2.54 M/UL — LOW (ref 3.8–5.2)
RBC # FLD: 14.7 % — HIGH (ref 10.3–14.5)
RBC # FLD: 14.7 % — HIGH (ref 10.3–14.5)
SODIUM SERPL-SCNC: 135 MMOL/L — SIGNIFICANT CHANGE UP (ref 135–145)
SODIUM SERPL-SCNC: 135 MMOL/L — SIGNIFICANT CHANGE UP (ref 135–145)
T4 AB SER-ACNC: 6.7 UG/DL — SIGNIFICANT CHANGE UP (ref 4.6–12)
T4 AB SER-ACNC: 6.7 UG/DL — SIGNIFICANT CHANGE UP (ref 4.6–12)
TSH SERPL-MCNC: 1.76 UIU/ML — SIGNIFICANT CHANGE UP (ref 0.27–4.2)
TSH SERPL-MCNC: 1.76 UIU/ML — SIGNIFICANT CHANGE UP (ref 0.27–4.2)
WBC # BLD: 10.19 K/UL — SIGNIFICANT CHANGE UP (ref 3.8–10.5)
WBC # BLD: 10.19 K/UL — SIGNIFICANT CHANGE UP (ref 3.8–10.5)
WBC # FLD AUTO: 10.19 K/UL — SIGNIFICANT CHANGE UP (ref 3.8–10.5)
WBC # FLD AUTO: 10.19 K/UL — SIGNIFICANT CHANGE UP (ref 3.8–10.5)

## 2023-11-16 PROCEDURE — 93306 TTE W/DOPPLER COMPLETE: CPT | Mod: 26

## 2023-11-16 PROCEDURE — 99221 1ST HOSP IP/OBS SF/LOW 40: CPT

## 2023-11-16 RX ORDER — POLYETHYLENE GLYCOL 3350 17 G/17G
17 POWDER, FOR SOLUTION ORAL DAILY
Refills: 0 | Status: DISCONTINUED | OUTPATIENT
Start: 2023-11-16 | End: 2023-11-21

## 2023-11-16 RX ORDER — QUETIAPINE FUMARATE 200 MG/1
25 TABLET, FILM COATED ORAL THREE TIMES A DAY
Refills: 0 | Status: DISCONTINUED | OUTPATIENT
Start: 2023-11-16 | End: 2023-11-21

## 2023-11-16 RX ORDER — DONEPEZIL HYDROCHLORIDE 10 MG/1
10 TABLET, FILM COATED ORAL
Refills: 0 | Status: DISCONTINUED | OUTPATIENT
Start: 2023-11-16 | End: 2023-11-21

## 2023-11-16 RX ORDER — CLONAZEPAM 1 MG
0.5 TABLET ORAL
Refills: 0 | Status: DISCONTINUED | OUTPATIENT
Start: 2023-11-16 | End: 2023-11-21

## 2023-11-16 RX ORDER — ASCORBIC ACID 60 MG
500 TABLET,CHEWABLE ORAL DAILY
Refills: 0 | Status: DISCONTINUED | OUTPATIENT
Start: 2023-11-16 | End: 2023-11-21

## 2023-11-16 RX ORDER — SENNA PLUS 8.6 MG/1
1 TABLET ORAL AT BEDTIME
Refills: 0 | Status: DISCONTINUED | OUTPATIENT
Start: 2023-11-16 | End: 2023-11-21

## 2023-11-16 RX ORDER — CLONAZEPAM 1 MG
0.25 TABLET ORAL
Refills: 0 | Status: DISCONTINUED | OUTPATIENT
Start: 2023-11-16 | End: 2023-11-16

## 2023-11-16 RX ADMIN — ATORVASTATIN CALCIUM 80 MILLIGRAM(S): 80 TABLET, FILM COATED ORAL at 16:08

## 2023-11-16 RX ADMIN — Medication 1 TABLET(S): at 17:47

## 2023-11-16 RX ADMIN — MEMANTINE HYDROCHLORIDE 5 MILLIGRAM(S): 10 TABLET ORAL at 09:05

## 2023-11-16 RX ADMIN — DONEPEZIL HYDROCHLORIDE 10 MILLIGRAM(S): 10 TABLET, FILM COATED ORAL at 21:58

## 2023-11-16 RX ADMIN — MEMANTINE HYDROCHLORIDE 5 MILLIGRAM(S): 10 TABLET ORAL at 21:36

## 2023-11-16 RX ADMIN — Medication 1 TABLET(S): at 06:16

## 2023-11-16 RX ADMIN — Medication 975 MILLIGRAM(S): at 22:10

## 2023-11-16 RX ADMIN — Medication 1000 MILLIGRAM(S): at 09:45

## 2023-11-16 RX ADMIN — Medication 1000 MILLIGRAM(S): at 00:30

## 2023-11-16 RX ADMIN — Medication 100 MILLIGRAM(S): at 06:13

## 2023-11-16 RX ADMIN — SENNA PLUS 1 TABLET(S): 8.6 TABLET ORAL at 21:36

## 2023-11-16 RX ADMIN — QUETIAPINE FUMARATE 150 MILLIGRAM(S): 200 TABLET, FILM COATED ORAL at 17:47

## 2023-11-16 RX ADMIN — RIVAROXABAN 10 MILLIGRAM(S): KIT at 11:08

## 2023-11-16 RX ADMIN — Medication 400 MILLIGRAM(S): at 09:05

## 2023-11-16 RX ADMIN — PANTOPRAZOLE SODIUM 40 MILLIGRAM(S): 20 TABLET, DELAYED RELEASE ORAL at 06:15

## 2023-11-16 RX ADMIN — Medication 3 MILLIGRAM(S): at 21:36

## 2023-11-16 RX ADMIN — Medication 1 TABLET(S): at 16:08

## 2023-11-16 RX ADMIN — Medication 975 MILLIGRAM(S): at 16:07

## 2023-11-16 RX ADMIN — ATORVASTATIN CALCIUM 80 MILLIGRAM(S): 80 TABLET, FILM COATED ORAL at 21:36

## 2023-11-16 RX ADMIN — Medication 40 MILLIGRAM(S): at 09:10

## 2023-11-16 RX ADMIN — TRAMADOL HYDROCHLORIDE 50 MILLIGRAM(S): 50 TABLET ORAL at 11:07

## 2023-11-16 RX ADMIN — TRAMADOL HYDROCHLORIDE 50 MILLIGRAM(S): 50 TABLET ORAL at 12:00

## 2023-11-16 RX ADMIN — Medication 500 MILLIGRAM(S): at 16:08

## 2023-11-16 RX ADMIN — POLYETHYLENE GLYCOL 3350 17 GRAM(S): 17 POWDER, FOR SOLUTION ORAL at 11:08

## 2023-11-16 RX ADMIN — Medication 975 MILLIGRAM(S): at 21:35

## 2023-11-16 NOTE — DIETITIAN INITIAL EVALUATION ADULT - PERTINENT LABORATORY DATA
11-16    135  |  101  |  23  ----------------------------<  87  4.3   |  23  |  1.09    Ca    8.3<L>      16 Nov 2023 07:26    TPro  6.7  /  Alb  3.6  /  TBili  0.7  /  DBili  x   /  AST  25  /  ALT  13  /  AlkPhos  68  11-14

## 2023-11-16 NOTE — CONSULT NOTE ADULT - SUBJECTIVE AND OBJECTIVE BOX
Mario Ramirez MD  Cardiology Fellow  All Cardiology service information can be found 24/7 on [email or web address], password: [email or web address]    Patient seen and evaluated at bedside    Chief Complaint:    HPI:  Orthopaedic Surgery Consult Note    Chief Complaint:    HPI:  89 y/o female w/ PMH dementia, depression, anxiety, HTN, HLD w/ PSH b/l TKA w Dr. Baker in 2010's and R IMN w Dr. Flood in 2000's s/p fall this AM. Pt was walking out of the restroom, felt her leg give out, and sat down. Pt twisted her right leg standing up, causing pain. Pt was diagnosed w/ a right periprsothetic tka fracture by PCP XRs, prompting ED arrival.    ROS: As documented in HPI, otherwise negative.    Underwent knee surgery with 250-300cc EBL on 11/15.     Cardiology consulted because patient was in a-fib with RVR in PACU after surgery. HR max was 110s, given 5mg IV Lopressor with improvement to . BP remained stable throughout. No EKG was obtained. Patient reportedly was at her baseline mental status and was asymptomatic.  On review of old EKGs from 2021, the patient had been in a-fib before (different MRN). She is not on AC at home.    Has reportedly been mildly dizzy with walking for the past two months. She walks with PT and has had these symptoms. Denies any prior cardiac history or workup.    Denies chest pain, SOB, palpitaitons.  PMHx:    dementia, depression, anxiety, HTN, HLD        PSHx:   No significant past surgical history        Allergies:  No Known Allergies      Home Medications:      Current Medications:   acetaminophen     Tablet .. 975 milliGRAM(s) Oral every 8 hours  acetaminophen   IVPB .. 1000 milliGRAM(s) IV Intermittent once  atorvastatin 80 milliGRAM(s) Oral at bedtime  calcium carbonate 1250 mG  + Vitamin D (OsCal 500 + D) 1 Tablet(s) Oral two times a day  ceFAZolin   IVPB 2000 milliGRAM(s) IV Intermittent every 8 hours  clonazePAM Oral Disintegrating Tablet 0.25 milliGRAM(s) Oral every 24 hours  donepezil 10 milliGRAM(s) Oral at bedtime  FLUoxetine 40 milliGRAM(s) Oral daily  lactated ringers. 1000 milliLiter(s) IV Continuous <Continuous>  losartan 50 milliGRAM(s) Oral daily  melatonin 3 milliGRAM(s) Oral at bedtime  memantine 5 milliGRAM(s) Oral every 12 hours  ondansetron   Disintegrating Tablet 4 milliGRAM(s) Oral every 12 hours PRN  ondansetron Injectable 4 milliGRAM(s) IV Push every 6 hours PRN  pantoprazole    Tablet 40 milliGRAM(s) Oral before breakfast  QUEtiapine  milliGRAM(s) Oral <User Schedule>  rivaroxaban 10 milliGRAM(s) Oral daily  traMADol 50 milliGRAM(s) Oral every 8 hours PRN  traMADol 25 milliGRAM(s) Oral every 8 hours PRN      FAMILY HISTORY:      Social History:  Smoking History:  Alcohol Use:  Drug Use:    REVIEW OF SYSTEMS:  CONSTITUTIONAL: No weakness, fevers or chills  RESPIRATORY: No cough, wheezing, hemoptysis; No shortness of breath  CARDIOVASCULAR: No chest pain or palpitations; No lower extremity edema  NEUROLOGICAL: Dizziness  SKIN: No itching, burning, rashes, or lesions   All other review of systems is negative unless indicated above.    Physical Exam:  T(F): 98 (11-16), Max: 99.1 (11-15)  HR: 53 (11-16) (51 - 112)  BP: 95/57 (11-16) (90/53 - 128/84)  RR: 18 (11-16)  SpO2: 100% (11-16)  GENERAL: No acute distress, well-developed  HEAD:  Atraumatic, Normocephalic  ENT:  No JVD, moist mucosa  CHEST/LUNG: Clear to auscultation bilaterally; No wheeze, equal breath sounds bilaterally   HEART: Irregular rhythm, no murmurs  EXTREMITIES:  No edema, RLE surgical dressing in place  PSYCH: Nl behavior, nl affect  NEUROLOGY: AAOx3, non-focal, cranial nerves intact  SKIN: Normal color, No rashes or lesions    LINES:    Cardiovascular Diagnostic Testing:    ECG: Personally reviewed:  11/15 NSR    Echo: Personally reviewed:    Stress Testing:    Cath:    Imaging:    CXR: Personally reviewed    Labs: Personally reviewed                        9.0    12.40 )-----------( 165      ( 15 Nov 2023 17:34 )             27.1     11-15    139  |  102  |  24<H>  ----------------------------<  125<H>  4.1   |  25  |  1.34<H>    Ca    8.3<L>      15 Nov 2023 17:34    TPro  6.7  /  Alb  3.6  /  TBili  0.7  /  DBili  x   /  AST  25  /  ALT  13  /  AlkPhos  68  11-14    PT/INR - ( 15 Nov 2023 01:57 )   PT: 12.2 sec;   INR: 1.17 ratio         PTT - ( 15 Nov 2023 01:57 )  PTT:25.0 sec

## 2023-11-16 NOTE — PROVIDER CONTACT NOTE (OTHER) - ACTION/TREATMENT ORDERED:
as per MD Freeman, no further interventions at this time. Continue to monitor vital signs and HR on tele monitor, monitor for signs and symptoms of bradycardia

## 2023-11-16 NOTE — DIETITIAN INITIAL EVALUATION ADULT - OTHER CALCULATIONS
Energy, protein and fluids needs based on dry weight of 140lb per daughter in consideration of Increased nutrient needs to aid wound healing.

## 2023-11-16 NOTE — OCCUPATIONAL THERAPY INITIAL EVALUATION ADULT - NSOTDMEREC_GEN_A_CORE
TBD at [facility]. should pt d/c home, recommend hospital bed, wheelchair with elevating leg rests, 3:1 commode, mechanical lift device. Patient is confined to a single room without a bathroom and requires a commode to optimize independence and for a safe home discharge.

## 2023-11-16 NOTE — OCCUPATIONAL THERAPY INITIAL EVALUATION ADULT - NSOTDISCHREC_GEN_A_CORE
should pt d/c home, recommend HOT and assistance with all aspects of self care and functional mobility/Sub-acute Rehab

## 2023-11-16 NOTE — DIETITIAN INITIAL EVALUATION ADULT - ENERGY INTAKE
Daughter reports pt with poor/fair PO intake ~50% of foods provided x 1 day due to pain from surgery and c/o nausea with the medications. Daughter is aware of menu ordering procedure in house, has been ordering softer/preferred foods to encourage PO intake. Does not want oral nutrition supplements while offered (stated pt does not like this) but willing to try Mighty Shakes 2x daily (vanilla and chocolate).

## 2023-11-16 NOTE — OCCUPATIONAL THERAPY INITIAL EVALUATION ADULT - LIVES WITH, PROFILE
Per chart, pt lives in apartment with elevator access and 24/7 HHA. Pt required assistance with ADLs PTA. Pt owns hospital bed, wheelchair, RW, shower chair and grab bars. Pt was receiving private PT services prior.

## 2023-11-16 NOTE — CONSULT NOTE ADULT - ATTENDING COMMENTS
89 y/o female w/ PMH dementia, depression, anxiety, HTN, HLD who presented with fall with R periprosthetic femur fracture with post-op course complicated by a-fib.    - [id number] score 4, so would benefit from AC when appropriate for CVA prophylaxis  - Currently AC contraindicated per surgical team, would recommend starting heparin gtt when acceptable, and transition to DOAC  - Currently asymptomatic and rate controlled, still in a-fib. Received 5mg IV lopressor. HR in 40s while sleeping.   - Would not add standing rate control at this time - rates are well controlled and would monitor the bradycardia to assess for possible tachy-aspen syndrome  - Would obtain TTE

## 2023-11-16 NOTE — PHYSICAL THERAPY INITIAL EVALUATION ADULT - PLANNED THERAPY INTERVENTIONS, PT EVAL
bed mobility training/gait training/transfer training Stair Negotiation Training: Patient will be able to negotiate up & down 1 flight of stairs independently with bilateral rails, step to gait pattern, in 4 weeks./bed mobility training/gait training/transfer training No

## 2023-11-16 NOTE — DIETITIAN INITIAL EVALUATION ADULT - CONTINUE CURRENT NUTRITION CARE PLAN
Continue diet free of therapeutic restriction, diet texture per SLP/team. RD remains available to adjust diet as needed./yes

## 2023-11-16 NOTE — PHYSICAL THERAPY INITIAL EVALUATION ADULT - RANGE OF MOTION EXAMINATION, REHAB EVAL
right LE ROM not tested/bilateral upper extremity ROM was WFL (within functional limits)/Left LE ROM was WFL (within functional limits)

## 2023-11-16 NOTE — DIETITIAN INITIAL EVALUATION ADULT - ORAL INTAKE PTA/DIET HISTORY
Daughter reports pt with fair/good PO intake, slightly decline with age but denies history of poor PO intake PTA. Usually will eat 3 meals daily.   Not on any therapeutic restriction but reports pt eating softer foods at home with denture. Denies nausea/vomiting/constipation/diarrhea PTA.   Confirms NKFA/intolerance  Micronutrient/Other supplementation: none  Protein-energy supplementation: none

## 2023-11-16 NOTE — OCCUPATIONAL THERAPY INITIAL EVALUATION ADULT - RANGE OF MOTION EXAMINATION, LOWER EXTREMITY
Left LE Active ROM was WNL  (within normal limits)/Left LE Passive ROM was WNL (within normal limits) RLE N/T, +immobilizer/Left LE Active ROM was WNL  (within normal limits)/Left LE Passive ROM was WNL (within normal limits)

## 2023-11-16 NOTE — CONSULT NOTE ADULT - ASSESSMENT
87 y/o female w/ PMH dementia, depression, anxiety, HTN, HLD who presented with fall with R periprosthetic femur fracture with post-op course complicated by a-fib.    - [id number] score 4, so would benefit from AC when appropriate for CVA prophylaxis  - Currently AC contraindicated per surgical team, would recommend starting heparin gtt when acceptable, and transition to DOAC  - Currently asymptomatic and rate controlled, still in a-fib. Received 5mg IV lopressor. HR in 40s while sleeping.   - Would not add standing rate control at this time.   - Would obtain TTE, as patient and caregiver state she has not had this in the past.     ***Note not finalized until co-signed by attending***     Mario Ramirez MD  Cardiology Fellow  All Cardiology service information can be found 24/7 on [email or web address], password: [email or web address] 
87 yo woman with a hx of HTN, dementia, Anxiety and depression presents after a fall with a right femur fracture. Patient is scheduled for an Open reduction and internal fixation of the right femur

## 2023-11-16 NOTE — DIETITIAN INITIAL EVALUATION ADULT - OTHER INFO
-- s/p Open Reduction Internal Fixation / Surgical Repair R Periprosthetic Fracture 11/15  -- On IVF Lactated Ringers @ 100ml/hr since 11/15  -- Micronutrient supplements: Calcium + vitamin D  -- GI: Zofran, Protonix, Miralax and Senna

## 2023-11-16 NOTE — PROVIDER CONTACT NOTE (OTHER) - ASSESSMENT
Pt is on telemetry monitoring. Pt HR noted to decreased down to 46. As per tele tech, HR fluctuates and ranges from 50s to 60s; HR also decreases at times to 40s, lowest HR noted to be 46. Pt denies any CP or SOB, pt resting comfortably. Vital signs obtained and MD Freeman made aware Pt is on telemetry monitoring. Pt HR noted to decrease down to 46. As per tele tech, HR fluctuates and ranges from 50s to 60s; HR also decreases at times to 40s, lowest HR noted to be 46. Pt denies any CP or SOB, pt resting comfortably. Vital signs obtained and MD Freeman made aware Pt is on telemetry monitoring. Pt HR noted to decrease down to 46. As per tele tech, HR fluctuates and ranges from 50s to 60s; HR also decreases at times to 40s, lowest HR noted to be 46. Pt denies any CP or SOB, pt resting comfortably. MD Freeman made aware

## 2023-11-16 NOTE — PHYSICAL THERAPY INITIAL EVALUATION ADULT - DISCHARGE PLANNER MADE AWARE
yes Stage 2: Additional Anesthesia Type: 1% lidocaine with 1:100,000 epinephrine and 408mcg clindamycin/ml and a 1:10 solution of 8.4% sodium bicarbonate

## 2023-11-16 NOTE — DIETITIAN INITIAL EVALUATION ADULT - PERTINENT MEDS FT
MEDICATIONS  (STANDING):  acetaminophen     Tablet .. 975 milliGRAM(s) Oral every 8 hours  atorvastatin 80 milliGRAM(s) Oral at bedtime  calcium carbonate 1250 mG  + Vitamin D (OsCal 500 + D) 1 Tablet(s) Oral two times a day  donepezil 10 milliGRAM(s) Oral <User Schedule>  FLUoxetine 40 milliGRAM(s) Oral daily  lactated ringers. 1000 milliLiter(s) (100 mL/Hr) IV Continuous <Continuous>  losartan 50 milliGRAM(s) Oral daily  melatonin 3 milliGRAM(s) Oral at bedtime  memantine 5 milliGRAM(s) Oral every 12 hours  pantoprazole    Tablet 40 milliGRAM(s) Oral before breakfast  polyethylene glycol 3350 17 Gram(s) Oral daily  QUEtiapine  milliGRAM(s) Oral <User Schedule>  rivaroxaban 10 milliGRAM(s) Oral daily  senna 1 Tablet(s) Oral at bedtime    MEDICATIONS  (PRN):  clonazePAM  Tablet 0.5 milliGRAM(s) Oral two times a day PRN anxiety  ondansetron   Disintegrating Tablet 4 milliGRAM(s) Oral every 12 hours PRN Nausea and/or Vomiting  ondansetron Injectable 4 milliGRAM(s) IV Push every 6 hours PRN Nausea and/or Vomiting  QUEtiapine 25 milliGRAM(s) Oral three times a day PRN home med  traMADol 50 milliGRAM(s) Oral every 8 hours PRN Severe Pain (7 - 10)  traMADol 25 milliGRAM(s) Oral every 8 hours PRN Moderate Pain (4 - 6)

## 2023-11-16 NOTE — DIETITIAN INITIAL EVALUATION ADULT - EDUCATION DIETARY MODIFICATIONS
Emphasized the importance of adequate kcal and protein intake; recommend to optimize nutritional intake in case of decreased appetite; recommended small frequent meals by ordering nutrient-dense snacks and leaving non-perishable food away from tray for later consumption during the day or between meals; reviewed foods with protein and menu order procedures in hospital./(1) partially meets; needs review/practice/verbalization

## 2023-11-16 NOTE — OCCUPATIONAL THERAPY INITIAL EVALUATION ADULT - PERTINENT HX OF CURRENT PROBLEM, REHAB EVAL
88F w/ PMH dementia, depression, anxiety, HTN, HLD w/ PSH b/l TKA w Dr. Baker in 2010's and R IMN w Dr. Flood in 2000's s/p fall this AM. Pt was walking out of the restroom, felt her leg give out, and sat down. Pt twisted her right leg standing up, causing pain. Pt was diagnosed w/ a right periprsothetic tka fracture by PCP XRs, prompting ED arrival. s/p right distal femur ORIF on 11/16.

## 2023-11-16 NOTE — OCCUPATIONAL THERAPY INITIAL EVALUATION ADULT - LEVEL OF INDEPENDENCE: SIT/STAND, REHAB EVAL
TBA on OT functional evaluation attempted with assist of 2; unable to clear surface/unable to perform

## 2023-11-16 NOTE — PHYSICAL THERAPY INITIAL EVALUATION ADULT - PERTINENT HX OF CURRENT PROBLEM, REHAB EVAL
as per chart review: PMH dementia, depression, anxiety, HTN, HLD w/ PSH knee/hip replacement c/o fall this AM. Pt was walking out of the restroom, felt her leg give out, and sat down. Pt twisted her right leg standing up, causing pain. Pt was diagnosed with a right femur periprosthetic fracture

## 2023-11-16 NOTE — DIETITIAN INITIAL EVALUATION ADULT - NSFNSGIASSESSMENTFT_GEN_A_CORE
Daughter reports pt with nauseous now with the medications, on Zofran. No BM since admission, on Miralax and Senna.

## 2023-11-16 NOTE — DIETITIAN INITIAL EVALUATION ADULT - REASON INDICATOR FOR ASSESSMENT
Pt seen for consult for pressure injury stage 2/>. Pt with dementia, most information obtained from daughter (Christine) at bedside, electronic medical record. Chart reviewed, events noted.

## 2023-11-16 NOTE — PROGRESS NOTE ADULT - ASSESSMENT
87 yo woman with a hx of HTN, dementia, Anxiety and depression presents after a fall with a right femur fracture. Patient is s/p an Open reduction and internal fixation of the right femur

## 2023-11-16 NOTE — DIETITIAN INITIAL EVALUATION ADULT - ADD RECOMMEND
-- RD will add Mighty Shakes 2x daily in chocolate and vanilla flavor (440kcal, 14g proteins) optimize calories and nutrients intake while in house.   -- Recommend MVI and Vitamin C, pending no medical contraindications, for micronutrient support and to aid wound healing.   -- Monitor PO intake, GI tolerance, skin integrity, labs, weight, and bowel movement regularity.   -- Will continue to honor food and beverage preferences to maximize level of nutrient intake.  -- Assist with meals PRN and encourage PO intake.  -- Trend PO intake to monitor risk of malnutrition.

## 2023-11-16 NOTE — DIETITIAN INITIAL EVALUATION ADULT - NSFNSNUTRCHEWSWALLOWFT_GEN_A_CORE
daughter reports pt eating softer foods at home but prefers for pt to stay on regular diet for now so she can have more food options. Daughter states she will pick softer foods from the menu. Preferences honored.

## 2023-11-16 NOTE — DIETITIAN INITIAL EVALUATION ADULT - PHYSCIAL ASSESSMENT
Drug Dosing Weight: none documented since admission     Ht per daughter 61"  Daily wt (standing or bed scale): none documented thus far  UBW: 140lb per daughter, denies wt loss PTA  Wt history from previous RD notes: no history   Wt history per [facility] HIE: 68.5kg (6/15/23), 68.7kg (4/14/22), 67.1kg (4/7/21), 66.4kg (10/12/19)      ** Noted with -5kg/7.2% in 5 months, nonsignificant. RD will continue to monitor wt trends as available/able.     IBW: 105lb, 133% IBW

## 2023-11-17 DIAGNOSIS — D62 ACUTE POSTHEMORRHAGIC ANEMIA: ICD-10-CM

## 2023-11-17 LAB
ANION GAP SERPL CALC-SCNC: 7 MMOL/L — SIGNIFICANT CHANGE UP (ref 5–17)
ANION GAP SERPL CALC-SCNC: 7 MMOL/L — SIGNIFICANT CHANGE UP (ref 5–17)
BUN SERPL-MCNC: 19 MG/DL — SIGNIFICANT CHANGE UP (ref 7–23)
BUN SERPL-MCNC: 19 MG/DL — SIGNIFICANT CHANGE UP (ref 7–23)
CALCIUM SERPL-MCNC: 8.3 MG/DL — LOW (ref 8.4–10.5)
CALCIUM SERPL-MCNC: 8.3 MG/DL — LOW (ref 8.4–10.5)
CHLORIDE SERPL-SCNC: 104 MMOL/L — SIGNIFICANT CHANGE UP (ref 96–108)
CHLORIDE SERPL-SCNC: 104 MMOL/L — SIGNIFICANT CHANGE UP (ref 96–108)
CO2 SERPL-SCNC: 27 MMOL/L — SIGNIFICANT CHANGE UP (ref 22–31)
CO2 SERPL-SCNC: 27 MMOL/L — SIGNIFICANT CHANGE UP (ref 22–31)
CREAT SERPL-MCNC: 1.07 MG/DL — SIGNIFICANT CHANGE UP (ref 0.5–1.3)
CREAT SERPL-MCNC: 1.07 MG/DL — SIGNIFICANT CHANGE UP (ref 0.5–1.3)
EGFR: 51 ML/MIN/1.73M2 — LOW
EGFR: 51 ML/MIN/1.73M2 — LOW
GLUCOSE SERPL-MCNC: 98 MG/DL — SIGNIFICANT CHANGE UP (ref 70–99)
GLUCOSE SERPL-MCNC: 98 MG/DL — SIGNIFICANT CHANGE UP (ref 70–99)
HCT VFR BLD CALC: 21.6 % — LOW (ref 34.5–45)
HCT VFR BLD CALC: 21.6 % — LOW (ref 34.5–45)
HGB BLD-MCNC: 6.9 G/DL — CRITICAL LOW (ref 11.5–15.5)
HGB BLD-MCNC: 6.9 G/DL — CRITICAL LOW (ref 11.5–15.5)
MCHC RBC-ENTMCNC: 31.9 GM/DL — LOW (ref 32–36)
MCHC RBC-ENTMCNC: 31.9 GM/DL — LOW (ref 32–36)
MCHC RBC-ENTMCNC: 32.7 PG — SIGNIFICANT CHANGE UP (ref 27–34)
MCHC RBC-ENTMCNC: 32.7 PG — SIGNIFICANT CHANGE UP (ref 27–34)
MCV RBC AUTO: 102.4 FL — HIGH (ref 80–100)
MCV RBC AUTO: 102.4 FL — HIGH (ref 80–100)
NRBC # BLD: 0 /100 WBCS — SIGNIFICANT CHANGE UP (ref 0–0)
NRBC # BLD: 0 /100 WBCS — SIGNIFICANT CHANGE UP (ref 0–0)
PLATELET # BLD AUTO: 122 K/UL — LOW (ref 150–400)
PLATELET # BLD AUTO: 122 K/UL — LOW (ref 150–400)
POTASSIUM SERPL-MCNC: 3.8 MMOL/L — SIGNIFICANT CHANGE UP (ref 3.5–5.3)
POTASSIUM SERPL-MCNC: 3.8 MMOL/L — SIGNIFICANT CHANGE UP (ref 3.5–5.3)
POTASSIUM SERPL-SCNC: 3.8 MMOL/L — SIGNIFICANT CHANGE UP (ref 3.5–5.3)
POTASSIUM SERPL-SCNC: 3.8 MMOL/L — SIGNIFICANT CHANGE UP (ref 3.5–5.3)
RBC # BLD: 2.11 M/UL — LOW (ref 3.8–5.2)
RBC # BLD: 2.11 M/UL — LOW (ref 3.8–5.2)
RBC # FLD: 14.7 % — HIGH (ref 10.3–14.5)
RBC # FLD: 14.7 % — HIGH (ref 10.3–14.5)
SODIUM SERPL-SCNC: 138 MMOL/L — SIGNIFICANT CHANGE UP (ref 135–145)
SODIUM SERPL-SCNC: 138 MMOL/L — SIGNIFICANT CHANGE UP (ref 135–145)
WBC # BLD: 6.64 K/UL — SIGNIFICANT CHANGE UP (ref 3.8–10.5)
WBC # BLD: 6.64 K/UL — SIGNIFICANT CHANGE UP (ref 3.8–10.5)
WBC # FLD AUTO: 6.64 K/UL — SIGNIFICANT CHANGE UP (ref 3.8–10.5)
WBC # FLD AUTO: 6.64 K/UL — SIGNIFICANT CHANGE UP (ref 3.8–10.5)

## 2023-11-17 PROCEDURE — 99233 SBSQ HOSP IP/OBS HIGH 50: CPT

## 2023-11-17 RX ORDER — FAMOTIDINE 10 MG/ML
20 INJECTION INTRAVENOUS DAILY
Refills: 0 | Status: DISCONTINUED | OUTPATIENT
Start: 2023-11-17 | End: 2023-11-17

## 2023-11-17 RX ORDER — FAMOTIDINE 10 MG/ML
20 INJECTION INTRAVENOUS DAILY
Refills: 0 | Status: COMPLETED | OUTPATIENT
Start: 2023-11-17 | End: 2023-11-19

## 2023-11-17 RX ORDER — FAMOTIDINE 10 MG/ML
40 INJECTION INTRAVENOUS
Refills: 0 | Status: DISCONTINUED | OUTPATIENT
Start: 2023-11-17 | End: 2023-11-20

## 2023-11-17 RX ADMIN — DONEPEZIL HYDROCHLORIDE 10 MILLIGRAM(S): 10 TABLET, FILM COATED ORAL at 21:01

## 2023-11-17 RX ADMIN — Medication 975 MILLIGRAM(S): at 06:24

## 2023-11-17 RX ADMIN — SENNA PLUS 1 TABLET(S): 8.6 TABLET ORAL at 20:54

## 2023-11-17 RX ADMIN — MEMANTINE HYDROCHLORIDE 5 MILLIGRAM(S): 10 TABLET ORAL at 21:01

## 2023-11-17 RX ADMIN — DONEPEZIL HYDROCHLORIDE 10 MILLIGRAM(S): 10 TABLET, FILM COATED ORAL at 09:15

## 2023-11-17 RX ADMIN — LOSARTAN POTASSIUM 50 MILLIGRAM(S): 100 TABLET, FILM COATED ORAL at 06:24

## 2023-11-17 RX ADMIN — Medication 1 TABLET(S): at 13:06

## 2023-11-17 RX ADMIN — Medication 975 MILLIGRAM(S): at 21:23

## 2023-11-17 RX ADMIN — QUETIAPINE FUMARATE 150 MILLIGRAM(S): 200 TABLET, FILM COATED ORAL at 18:06

## 2023-11-17 RX ADMIN — ATORVASTATIN CALCIUM 80 MILLIGRAM(S): 80 TABLET, FILM COATED ORAL at 20:53

## 2023-11-17 RX ADMIN — Medication 1 TABLET(S): at 06:24

## 2023-11-17 RX ADMIN — PANTOPRAZOLE SODIUM 40 MILLIGRAM(S): 20 TABLET, DELAYED RELEASE ORAL at 06:24

## 2023-11-17 RX ADMIN — Medication 3 MILLIGRAM(S): at 18:16

## 2023-11-17 RX ADMIN — RIVAROXABAN 10 MILLIGRAM(S): KIT at 13:06

## 2023-11-17 RX ADMIN — Medication 975 MILLIGRAM(S): at 20:53

## 2023-11-17 RX ADMIN — Medication 1 TABLET(S): at 18:06

## 2023-11-17 RX ADMIN — POLYETHYLENE GLYCOL 3350 17 GRAM(S): 17 POWDER, FOR SOLUTION ORAL at 13:10

## 2023-11-17 RX ADMIN — TRAMADOL HYDROCHLORIDE 25 MILLIGRAM(S): 50 TABLET ORAL at 10:16

## 2023-11-17 RX ADMIN — Medication 40 MILLIGRAM(S): at 13:07

## 2023-11-17 RX ADMIN — ONDANSETRON 4 MILLIGRAM(S): 8 TABLET, FILM COATED ORAL at 14:42

## 2023-11-17 RX ADMIN — FAMOTIDINE 20 MILLIGRAM(S): 10 INJECTION INTRAVENOUS at 15:52

## 2023-11-17 RX ADMIN — Medication 500 MILLIGRAM(S): at 13:06

## 2023-11-17 RX ADMIN — MEMANTINE HYDROCHLORIDE 5 MILLIGRAM(S): 10 TABLET ORAL at 09:16

## 2023-11-17 RX ADMIN — Medication 975 MILLIGRAM(S): at 16:44

## 2023-11-17 RX ADMIN — TRAMADOL HYDROCHLORIDE 25 MILLIGRAM(S): 50 TABLET ORAL at 09:16

## 2023-11-17 RX ADMIN — Medication 975 MILLIGRAM(S): at 15:44

## 2023-11-17 NOTE — PROVIDER CONTACT NOTE (CRITICAL VALUE NOTIFICATION) - NS PROVIDER READ BACK
[facility] Nephrology Daily Progress Note    Date of Service  2/10/2020    AUTHOR: Harsh Ferrer M.D.    Chief Complaint   Follow up ESRD    HPI  75 y.o. female admitted to [facility] on 1/14/20 with seizures.She was at a SNF.She had been previously hospitalized at [facility] and diaysis was initiated.  She was doing very poorly then and palliative care was discussed with the family,but they declined.She had very poor functional status and required Jimena lift to get into the dialysis chair.She was found to have  a CVA on MRI at [facility].Her seizures were not controlled and it was felt she was in status epilepticus.  She was getting HD at [facility].Last HD was on 1/13/20.She was seen by Dr Carcamo at [facility].Creatinine was 1.8 and dialysis was held. Neurology [place] that the patient needed continuous EEG monitoring and he was transferred to [facility]    DAILY NEPHROLOGY SUMMARY:  Please see note from 1/31 for prior summaries  2/01 - NAEO, another EEG being done this AM  2/02 - NAEO, scheduled for Trach today  2/03 - No events, underwent trach yesterday, no change in neuro status, BP stable overnight but low this am, given midodrine, to have HD today  2/04 - No events, tolerated HD yest with 2.1L UF, BP stable this am  2/05 - No events, BP low prior to starting HD, given albumin at the start of HD and BP stable at this time  2/06 - No events, tolerated HD yesterday with 2.5L UF, BP stable and high at times, still with dependent edema  2/07 - No events, tolerated PUF yesterday with 2.5L UF and did not require midodrine with treatment, BP stable today, still with dependent edema, to have HD today  2/08 - No overnight events or changes.  On TF at goal via NGT.  No neurologic change.  On vent/trach.   2/09 - No significant change.  No events.  Liquid stool from rectal tube.  On Vent/Trach  2/10-  No new overnight renal events. +trach.    Review of Systems   Unable to perform ROS: Acuity of condition     PAST FAMILY HISTORY:  Reviewed and unchanged since admission  PAST SURGICAL HISTORY:  Reviewed and unchanged since admission  SOCIAL HISTORY:  Reviewed and unchanged since admission  FAMILY HISTORY: Reviewed and unchanged since admission  CURRENT MEDICATIONS: Reviewed since admission to present  IMAGING STUDIES: Reviewed since admission to present  LABORATORY STUDIES: Reviewed since admission to present    Physical Exam  Temp:  [36.4 °C (97.5 °F)-37.2 °C (99 °F)] 37.2 °C (99 °F)  Pulse:  [75-94] 86  Resp:  [16-37] 16  BP: (115-187)/(58-85) 142/60  SpO2:  [96 %-100 %] 98 %    Physical Exam  Vitals signs and nursing note reviewed.   Constitutional:       General: She is not in acute distress.     Appearance: Normal appearance. She is ill-appearing.   HENT:      Head: Normocephalic and atraumatic.      Right Ear: External ear normal.      Left Ear: External ear normal.      Nose: Nose normal. No congestion.      Mouth/Throat:      Mouth: Mucous membranes are moist.      Pharynx: No oropharyngeal exudate.      Comments: ETT  Eyes:      General: No scleral icterus.     Conjunctiva/sclera: Conjunctivae normal.      Pupils: Pupils are equal, round, and reactive to light.   Neck:      Musculoskeletal: Neck supple. No muscular tenderness.      Comments: +Trach  Cardiovascular:      Rate and Rhythm: Normal rate and regular rhythm.      Heart sounds: Normal heart sounds.   Pulmonary:      Effort: Pulmonary effort is normal. No respiratory distress.      Breath sounds: Normal breath sounds.      Comments: +Vent  Chest:      Chest wall: No tenderness.   Abdominal:      General: Bowel sounds are normal. There is no distension.      Palpations: Abdomen is soft.   Musculoskeletal:         General: Swelling present. No tenderness or signs of injury.      Comments: +dependent edema   Lymphadenopathy:      Cervical: No cervical adenopathy.   Skin:     General: Skin is warm and dry.      Findings: No rash.   Neurological:      Mental Status: She is alert.       Comments: Somnolent, opens eyes to painful stimuli, withdraws to painful stimuli   Psychiatric:      Comments: Unable to assess, pt unresponsive       Fluids    Intake/Output Summary (Last 24 hours) at 2/10/2020 1005  Last data filed at 2/10/2020 0600  Gross per 24 hour   Intake 90 ml   Output 1000 ml   Net -910 ml     Laboratory  Recent Labs     02/07/20  1830 02/08/20  0550 02/09/20  0600   WBC  --  14.0* 14.2*   RBC  --  2.14* 2.09*   HEMOGLOBIN 7.9* 7.3* 7.2*   HEMATOCRIT  --  23.1* 22.6*   MCV  --  107.9* 108.1*   MCH  --  34.1* 34.4*   MCHC  --  31.6* 31.9*   RDW  --  62.7* 62.3*   PLATELETCT  --  277 287   MPV  --  9.0 9.0     Recent Labs     02/08/20  0550 02/09/20  0600   SODIUM 136 136   POTASSIUM 3.9 4.3   CHLORIDE 100 100   CO2 29 26   GLUCOSE 140* 157*   BUN 51* 86*   CREATININE 1.22 1.60*   CALCIUM 9.1 9.3     IMPRESSION:  # ESRD   No need for dialysis today (SUN)   Maintenance dialysis qMWF as OP at [facility]   mCrCl 6 by 24 hour urine  # Status epilepticus  # S/P acute lacunar infarct   Hx of previous CVA with significant deficits.  # HTN--BP variable often with intradialytic hypotension  # DM II  # Acute Hypoxic Respiratory Failure   CXR 2/3 still with bilateral pulm edema  # Hx of dysphagia  # Anemia of CKD.Ferritin previously significantly elevated. CAT  # CKD-MBD.Was managed at HD unit  # CAD  # DLD  # Gout,on Allopurinol  # Hx of PEG tube  # Hx of RALF  # Hx of UTI  # COPD  # Edema/Anasarca--improved  # hypophosphatemia  # hyponatremia  # Prognosis poor   Family expectations and goals for patient are not in line with prognosis.    SUGGESTIONS:   Maintenance dialysis qMWF and PRN. HD today.   UF with HD as tolerated   Midodrine 10mg prior to dialysis PRN if SBP low   Seizures management per Neurology--dose adjust meds to accomodate dialysis   Enteral feedings   No dietary protein restrictions   Epogen with HD to goal Hgb 10-11   Follow labs   Continue GOC discussions with family   Very poor  prognosis, recommend comfort care, if and when family agreeable    Thank you,   yes

## 2023-11-17 NOTE — ADVANCED PRACTICE NURSE CONSULT - REASON FOR CONSULT
Requested by staff to assess skin status of pt a/w a deep tissue injury. PMH is noted:  89 y/o female w/ PMH dementia, depression, anxiety, HTN, HLD w/ PSH b/l TKA w Dr. Baker in 2010's and R IMN w Dr. Flood in 2000's s/p fall this AM. Pt was walking out of the restroom, felt her leg give out, and sat down. Pt twisted her right leg standing up, causing pain. Pt was diagnosed w/ a right periprsothetic tka fracture by PCP XRs, prompting ED arrival.  Pt is s/p ORIF of the right femur on 7/15.

## 2023-11-17 NOTE — ADVANCED PRACTICE NURSE CONSULT - ASSESSMENT
The pt was encountered on [facility]- her private HHA at the bedside. Mrs Garner was awake and alert and engaging in conversation. She is in a VersaCare P 500 support surface and is resistant to being turned. She allowed us to turn her with encouragement and 4 staff members at bedside.  Her RLE is in a soft splint.  As she was a/w a deep tissue injury, she was seen by nutrition and a consult is noted.  Will recommend the Ronald-Lock cushion to off-load her heels  There is a Ronald-Form positioner at bedside for T&P  The pt has a Pure-Wick catheter in place to divert urine from the skin. UPon assessment, a large amount of urine was noted and pericare was provided. primary RN to change the Pure-Wick.  On the sacrum and b/l buttocks the skin was intact but with a light dusky purple tone- this may be secondary to IAD but cannot r/o that this may be a DTI present on admission.  Gurwinder barrier cream was applied. Education was provided to the pt and HHA about the condition of the skin, tx used and the importance of PI prevention including T&P, Skin care, boots and off-loading heels

## 2023-11-17 NOTE — ADVANCED PRACTICE NURSE CONSULT - RECOMMEDATIONS
Will recommend the followin. Sacrum, b/l buttocks: continue to monitor, routine pericare with Cem  2. Continue to encourage mobility, T&P  3. Off-load heels with Ronald-Lock cushion  4. seat cushion when OOB to chair  5. Nutrition support as pt condition allows  Tx plan discussed with RN

## 2023-11-17 NOTE — PROGRESS NOTE ADULT - ASSESSMENT
89 y/o female w/ PMH dementia, depression, anxiety, HTN, HLD who presented with fall with R periprosthetic femur fracture with post-op course complicated by a-fib.    - [id number] score 4, so would benefit from AC when appropriate for CVA prophylaxis  - Currently AC contraindicated per surgical team, would recommend starting heparin gtt when acceptable, and transition to DOAC  - Currently asymptomatic and rate controlled, still in a-fib. Received 5mg IV lopressor. HR in 40s while sleeping.   - Would not add standing rate control at this time.   - awaiting TTE, as patient and caregiver state she has not had this in the past.

## 2023-11-17 NOTE — PROGRESS NOTE ADULT - ASSESSMENT
A/P: S/p ORIF, fracture, distal femur on 11/15    - 2U PRBCs today  - NWB in RLE BJKI  -DVT PPx: Xarelto 10mg QD  -Pain Control PO/IV Pain Rx-- minimize narcotics  -Continue Current Tx        ** Home Rx confirmed w/ daughter  -Dispo planning: TBD    For all questions related to patient care, please reach out to the on-call team via the pager.     Fatimah Morillo, PGY 2  Orthopaedic Surgery  [facility] [phone]  [facility] [phone]  [facility] [phone]

## 2023-11-18 LAB
ANION GAP SERPL CALC-SCNC: 10 MMOL/L — SIGNIFICANT CHANGE UP (ref 5–17)
ANION GAP SERPL CALC-SCNC: 10 MMOL/L — SIGNIFICANT CHANGE UP (ref 5–17)
BUN SERPL-MCNC: 13 MG/DL — SIGNIFICANT CHANGE UP (ref 7–23)
BUN SERPL-MCNC: 13 MG/DL — SIGNIFICANT CHANGE UP (ref 7–23)
CALCIUM SERPL-MCNC: 8.5 MG/DL — SIGNIFICANT CHANGE UP (ref 8.4–10.5)
CALCIUM SERPL-MCNC: 8.5 MG/DL — SIGNIFICANT CHANGE UP (ref 8.4–10.5)
CHLORIDE SERPL-SCNC: 107 MMOL/L — SIGNIFICANT CHANGE UP (ref 96–108)
CHLORIDE SERPL-SCNC: 107 MMOL/L — SIGNIFICANT CHANGE UP (ref 96–108)
CO2 SERPL-SCNC: 22 MMOL/L — SIGNIFICANT CHANGE UP (ref 22–31)
CO2 SERPL-SCNC: 22 MMOL/L — SIGNIFICANT CHANGE UP (ref 22–31)
CREAT SERPL-MCNC: 0.84 MG/DL — SIGNIFICANT CHANGE UP (ref 0.5–1.3)
CREAT SERPL-MCNC: 0.84 MG/DL — SIGNIFICANT CHANGE UP (ref 0.5–1.3)
EGFR: 68 ML/MIN/1.73M2 — SIGNIFICANT CHANGE UP
EGFR: 68 ML/MIN/1.73M2 — SIGNIFICANT CHANGE UP
GLUCOSE SERPL-MCNC: 83 MG/DL — SIGNIFICANT CHANGE UP (ref 70–99)
GLUCOSE SERPL-MCNC: 83 MG/DL — SIGNIFICANT CHANGE UP (ref 70–99)
HCT VFR BLD CALC: 31.9 % — LOW (ref 34.5–45)
HCT VFR BLD CALC: 31.9 % — LOW (ref 34.5–45)
HGB BLD-MCNC: 9.7 G/DL — LOW (ref 11.5–15.5)
HGB BLD-MCNC: 9.7 G/DL — LOW (ref 11.5–15.5)
MCHC RBC-ENTMCNC: 30.4 GM/DL — LOW (ref 32–36)
MCHC RBC-ENTMCNC: 30.4 GM/DL — LOW (ref 32–36)
MCHC RBC-ENTMCNC: 31.7 PG — SIGNIFICANT CHANGE UP (ref 27–34)
MCHC RBC-ENTMCNC: 31.7 PG — SIGNIFICANT CHANGE UP (ref 27–34)
MCV RBC AUTO: 104.2 FL — HIGH (ref 80–100)
MCV RBC AUTO: 104.2 FL — HIGH (ref 80–100)
NRBC # BLD: 0 /100 WBCS — SIGNIFICANT CHANGE UP (ref 0–0)
NRBC # BLD: 0 /100 WBCS — SIGNIFICANT CHANGE UP (ref 0–0)
PLATELET # BLD AUTO: 123 K/UL — LOW (ref 150–400)
PLATELET # BLD AUTO: 123 K/UL — LOW (ref 150–400)
POTASSIUM SERPL-MCNC: 4 MMOL/L — SIGNIFICANT CHANGE UP (ref 3.5–5.3)
POTASSIUM SERPL-MCNC: 4 MMOL/L — SIGNIFICANT CHANGE UP (ref 3.5–5.3)
POTASSIUM SERPL-SCNC: 4 MMOL/L — SIGNIFICANT CHANGE UP (ref 3.5–5.3)
POTASSIUM SERPL-SCNC: 4 MMOL/L — SIGNIFICANT CHANGE UP (ref 3.5–5.3)
RBC # BLD: 3.06 M/UL — LOW (ref 3.8–5.2)
RBC # BLD: 3.06 M/UL — LOW (ref 3.8–5.2)
RBC # FLD: 17.8 % — HIGH (ref 10.3–14.5)
RBC # FLD: 17.8 % — HIGH (ref 10.3–14.5)
SODIUM SERPL-SCNC: 139 MMOL/L — SIGNIFICANT CHANGE UP (ref 135–145)
SODIUM SERPL-SCNC: 139 MMOL/L — SIGNIFICANT CHANGE UP (ref 135–145)
WBC # BLD: 5.8 K/UL — SIGNIFICANT CHANGE UP (ref 3.8–10.5)
WBC # BLD: 5.8 K/UL — SIGNIFICANT CHANGE UP (ref 3.8–10.5)
WBC # FLD AUTO: 5.8 K/UL — SIGNIFICANT CHANGE UP (ref 3.8–10.5)
WBC # FLD AUTO: 5.8 K/UL — SIGNIFICANT CHANGE UP (ref 3.8–10.5)

## 2023-11-18 RX ADMIN — Medication 975 MILLIGRAM(S): at 20:46

## 2023-11-18 RX ADMIN — POLYETHYLENE GLYCOL 3350 17 GRAM(S): 17 POWDER, FOR SOLUTION ORAL at 12:59

## 2023-11-18 RX ADMIN — Medication 3 MILLIGRAM(S): at 20:16

## 2023-11-18 RX ADMIN — QUETIAPINE FUMARATE 150 MILLIGRAM(S): 200 TABLET, FILM COATED ORAL at 21:06

## 2023-11-18 RX ADMIN — Medication 1 TABLET(S): at 05:40

## 2023-11-18 RX ADMIN — DONEPEZIL HYDROCHLORIDE 10 MILLIGRAM(S): 10 TABLET, FILM COATED ORAL at 09:36

## 2023-11-18 RX ADMIN — MEMANTINE HYDROCHLORIDE 5 MILLIGRAM(S): 10 TABLET ORAL at 20:14

## 2023-11-18 RX ADMIN — Medication 975 MILLIGRAM(S): at 20:16

## 2023-11-18 RX ADMIN — QUETIAPINE FUMARATE 25 MILLIGRAM(S): 200 TABLET, FILM COATED ORAL at 18:14

## 2023-11-18 RX ADMIN — Medication 975 MILLIGRAM(S): at 06:09

## 2023-11-18 RX ADMIN — Medication 975 MILLIGRAM(S): at 13:54

## 2023-11-18 RX ADMIN — PANTOPRAZOLE SODIUM 40 MILLIGRAM(S): 20 TABLET, DELAYED RELEASE ORAL at 05:39

## 2023-11-18 RX ADMIN — Medication 1 TABLET(S): at 18:14

## 2023-11-18 RX ADMIN — ATORVASTATIN CALCIUM 80 MILLIGRAM(S): 80 TABLET, FILM COATED ORAL at 20:15

## 2023-11-18 RX ADMIN — Medication 40 MILLIGRAM(S): at 09:37

## 2023-11-18 RX ADMIN — Medication 1 TABLET(S): at 09:36

## 2023-11-18 RX ADMIN — Medication 975 MILLIGRAM(S): at 05:39

## 2023-11-18 RX ADMIN — MEMANTINE HYDROCHLORIDE 5 MILLIGRAM(S): 10 TABLET ORAL at 09:35

## 2023-11-18 RX ADMIN — DONEPEZIL HYDROCHLORIDE 10 MILLIGRAM(S): 10 TABLET, FILM COATED ORAL at 20:14

## 2023-11-18 RX ADMIN — RIVAROXABAN 10 MILLIGRAM(S): KIT at 12:59

## 2023-11-18 RX ADMIN — Medication 975 MILLIGRAM(S): at 14:54

## 2023-11-18 RX ADMIN — Medication 500 MILLIGRAM(S): at 09:36

## 2023-11-18 RX ADMIN — SENNA PLUS 1 TABLET(S): 8.6 TABLET ORAL at 20:15

## 2023-11-19 ENCOUNTER — TRANSCRIPTION ENCOUNTER (OUTPATIENT)
Age: 88
End: 2023-11-19

## 2023-11-19 DIAGNOSIS — K59.00 CONSTIPATION, UNSPECIFIED: ICD-10-CM

## 2023-11-19 LAB
ANION GAP SERPL CALC-SCNC: 10 MMOL/L — SIGNIFICANT CHANGE UP (ref 5–17)
ANION GAP SERPL CALC-SCNC: 10 MMOL/L — SIGNIFICANT CHANGE UP (ref 5–17)
BUN SERPL-MCNC: 12 MG/DL — SIGNIFICANT CHANGE UP (ref 7–23)
BUN SERPL-MCNC: 12 MG/DL — SIGNIFICANT CHANGE UP (ref 7–23)
CALCIUM SERPL-MCNC: 8.5 MG/DL — SIGNIFICANT CHANGE UP (ref 8.4–10.5)
CALCIUM SERPL-MCNC: 8.5 MG/DL — SIGNIFICANT CHANGE UP (ref 8.4–10.5)
CHLORIDE SERPL-SCNC: 105 MMOL/L — SIGNIFICANT CHANGE UP (ref 96–108)
CHLORIDE SERPL-SCNC: 105 MMOL/L — SIGNIFICANT CHANGE UP (ref 96–108)
CO2 SERPL-SCNC: 24 MMOL/L — SIGNIFICANT CHANGE UP (ref 22–31)
CO2 SERPL-SCNC: 24 MMOL/L — SIGNIFICANT CHANGE UP (ref 22–31)
CREAT SERPL-MCNC: 0.77 MG/DL — SIGNIFICANT CHANGE UP (ref 0.5–1.3)
CREAT SERPL-MCNC: 0.77 MG/DL — SIGNIFICANT CHANGE UP (ref 0.5–1.3)
EGFR: 75 ML/MIN/1.73M2 — SIGNIFICANT CHANGE UP
EGFR: 75 ML/MIN/1.73M2 — SIGNIFICANT CHANGE UP
GLUCOSE SERPL-MCNC: 101 MG/DL — HIGH (ref 70–99)
GLUCOSE SERPL-MCNC: 101 MG/DL — HIGH (ref 70–99)
HCT VFR BLD CALC: 32.7 % — LOW (ref 34.5–45)
HCT VFR BLD CALC: 32.7 % — LOW (ref 34.5–45)
HGB BLD-MCNC: 10.6 G/DL — LOW (ref 11.5–15.5)
HGB BLD-MCNC: 10.6 G/DL — LOW (ref 11.5–15.5)
MCHC RBC-ENTMCNC: 31.4 PG — SIGNIFICANT CHANGE UP (ref 27–34)
MCHC RBC-ENTMCNC: 31.4 PG — SIGNIFICANT CHANGE UP (ref 27–34)
MCHC RBC-ENTMCNC: 32.4 GM/DL — SIGNIFICANT CHANGE UP (ref 32–36)
MCHC RBC-ENTMCNC: 32.4 GM/DL — SIGNIFICANT CHANGE UP (ref 32–36)
MCV RBC AUTO: 96.7 FL — SIGNIFICANT CHANGE UP (ref 80–100)
MCV RBC AUTO: 96.7 FL — SIGNIFICANT CHANGE UP (ref 80–100)
NRBC # BLD: 0 /100 WBCS — SIGNIFICANT CHANGE UP (ref 0–0)
NRBC # BLD: 0 /100 WBCS — SIGNIFICANT CHANGE UP (ref 0–0)
PLATELET # BLD AUTO: 189 K/UL — SIGNIFICANT CHANGE UP (ref 150–400)
PLATELET # BLD AUTO: 189 K/UL — SIGNIFICANT CHANGE UP (ref 150–400)
POTASSIUM SERPL-MCNC: 3.8 MMOL/L — SIGNIFICANT CHANGE UP (ref 3.5–5.3)
POTASSIUM SERPL-MCNC: 3.8 MMOL/L — SIGNIFICANT CHANGE UP (ref 3.5–5.3)
POTASSIUM SERPL-SCNC: 3.8 MMOL/L — SIGNIFICANT CHANGE UP (ref 3.5–5.3)
POTASSIUM SERPL-SCNC: 3.8 MMOL/L — SIGNIFICANT CHANGE UP (ref 3.5–5.3)
RBC # BLD: 3.38 M/UL — LOW (ref 3.8–5.2)
RBC # BLD: 3.38 M/UL — LOW (ref 3.8–5.2)
RBC # FLD: 16.8 % — HIGH (ref 10.3–14.5)
RBC # FLD: 16.8 % — HIGH (ref 10.3–14.5)
SODIUM SERPL-SCNC: 139 MMOL/L — SIGNIFICANT CHANGE UP (ref 135–145)
SODIUM SERPL-SCNC: 139 MMOL/L — SIGNIFICANT CHANGE UP (ref 135–145)
WBC # BLD: 10.77 K/UL — HIGH (ref 3.8–10.5)
WBC # BLD: 10.77 K/UL — HIGH (ref 3.8–10.5)
WBC # FLD AUTO: 10.77 K/UL — HIGH (ref 3.8–10.5)
WBC # FLD AUTO: 10.77 K/UL — HIGH (ref 3.8–10.5)

## 2023-11-19 RX ORDER — LACTULOSE 10 G/15ML
200 SOLUTION ORAL ONCE
Refills: 0 | Status: DISCONTINUED | OUTPATIENT
Start: 2023-11-19 | End: 2023-11-21

## 2023-11-19 RX ORDER — LACTULOSE 10 G/15ML
200 SOLUTION ORAL DAILY
Refills: 0 | Status: DISCONTINUED | OUTPATIENT
Start: 2023-11-19 | End: 2023-11-19

## 2023-11-19 RX ADMIN — LOSARTAN POTASSIUM 50 MILLIGRAM(S): 100 TABLET, FILM COATED ORAL at 05:07

## 2023-11-19 RX ADMIN — PANTOPRAZOLE SODIUM 40 MILLIGRAM(S): 20 TABLET, DELAYED RELEASE ORAL at 05:07

## 2023-11-19 RX ADMIN — DONEPEZIL HYDROCHLORIDE 10 MILLIGRAM(S): 10 TABLET, FILM COATED ORAL at 09:09

## 2023-11-19 RX ADMIN — TRAMADOL HYDROCHLORIDE 50 MILLIGRAM(S): 50 TABLET ORAL at 12:18

## 2023-11-19 RX ADMIN — Medication 975 MILLIGRAM(S): at 22:20

## 2023-11-19 RX ADMIN — Medication 40 MILLIGRAM(S): at 09:10

## 2023-11-19 RX ADMIN — Medication 975 MILLIGRAM(S): at 05:09

## 2023-11-19 RX ADMIN — Medication 1 TABLET(S): at 05:08

## 2023-11-19 RX ADMIN — Medication 1 TABLET(S): at 18:01

## 2023-11-19 RX ADMIN — Medication 1 TABLET(S): at 11:43

## 2023-11-19 RX ADMIN — Medication 3 MILLIGRAM(S): at 21:10

## 2023-11-19 RX ADMIN — ONDANSETRON 4 MILLIGRAM(S): 8 TABLET, FILM COATED ORAL at 11:43

## 2023-11-19 RX ADMIN — Medication 975 MILLIGRAM(S): at 05:36

## 2023-11-19 RX ADMIN — TRAMADOL HYDROCHLORIDE 50 MILLIGRAM(S): 50 TABLET ORAL at 11:43

## 2023-11-19 RX ADMIN — QUETIAPINE FUMARATE 150 MILLIGRAM(S): 200 TABLET, FILM COATED ORAL at 18:01

## 2023-11-19 RX ADMIN — MEMANTINE HYDROCHLORIDE 5 MILLIGRAM(S): 10 TABLET ORAL at 21:10

## 2023-11-19 RX ADMIN — MEMANTINE HYDROCHLORIDE 5 MILLIGRAM(S): 10 TABLET ORAL at 09:09

## 2023-11-19 RX ADMIN — DONEPEZIL HYDROCHLORIDE 10 MILLIGRAM(S): 10 TABLET, FILM COATED ORAL at 21:10

## 2023-11-19 RX ADMIN — Medication 975 MILLIGRAM(S): at 21:11

## 2023-11-19 RX ADMIN — RIVAROXABAN 10 MILLIGRAM(S): KIT at 11:43

## 2023-11-19 RX ADMIN — POLYETHYLENE GLYCOL 3350 17 GRAM(S): 17 POWDER, FOR SOLUTION ORAL at 11:44

## 2023-11-19 RX ADMIN — Medication 10 MILLIGRAM(S): at 10:56

## 2023-11-19 RX ADMIN — ATORVASTATIN CALCIUM 80 MILLIGRAM(S): 80 TABLET, FILM COATED ORAL at 21:10

## 2023-11-19 RX ADMIN — Medication 500 MILLIGRAM(S): at 09:09

## 2023-11-19 RX ADMIN — SENNA PLUS 1 TABLET(S): 8.6 TABLET ORAL at 21:12

## 2023-11-19 RX ADMIN — FAMOTIDINE 20 MILLIGRAM(S): 10 INJECTION INTRAVENOUS at 11:43

## 2023-11-19 NOTE — PROVIDER CONTACT NOTE (OTHER) - BACKGROUND
s/p FALL   RIGHT HIP FRACTURE
Pt was admitted on 11/14/23. Pt is s/p OR for a Right distal Femur ORIF

## 2023-11-19 NOTE — PROVIDER CONTACT NOTE (OTHER) - REASON
pt bradycardic at times on telemetry monitor, HR has decreased to 46 at times
Pt noted to have a lump on the left breast

## 2023-11-19 NOTE — PROGRESS NOTE ADULT - ASSESSMENT
A/P: S/p ORIF, fracture, distal femur on 11/15    - FU AM labs  - NWB in RLE BJKI  -DVT PPx: Xarelto 10mg QD  -Pain Control PO/IV Pain Rx-- minimize narcotics  -Continue Current Tx        ** Home Rx confirmed w/ daughter  -Dispo planning: TBD      For all questions related to patient care, please reach out to the on-call team via the pager.     Fatimah Morillo, PGY 2  Orthopaedic Surgery  [facility] [phone]  [facility] [phone]  [facility] [phone]   A/P: S/p ORIF, fracture, distal femur on 11/15    - FU AM labs  - NWB in RLE BJKI  -DVT PPx: Xarelto 10mg QD  -Pain Control PO/IV Pain Rx-- minimize narcotics  -Continue Current Tx        ** Home Rx confirmed w/ daughter  -Dispo planning: TBD      For all questions related to patient care, please reach out to the on-call team via the pager.     Fatimah Morillo, PGY 2  Orthopaedic Surgery  [facility] [phone]  [facility] [phone]  [facility] [phone]   A/P: S/p ORIF, fracture, distal femur on 11/15    - FU AM labs  - NWB in RLE BJKI  -DVT PPx: Xarelto 10mg QD  -Pain Control PO/IV Pain Rx-- minimize narcotics  -Continue Current Tx        ** Home Rx confirmed w/ daughter  -Dispo planning: TBD      For all questions related to patient care, please reach out to the on-call team via the pager.     Fatimah Morillo, PGY 2  Orthopaedic Surgery  [facility] [phone]  [facility] [phone]  [facility] [phone]

## 2023-11-19 NOTE — DISCHARGE NOTE PROVIDER - PROVIDER TOKENS
PROVIDER:[TOKEN:[[id number]]] PROVIDER:[TOKEN:[[id number]]] PROVIDER:[TOKEN:[[id number]]]

## 2023-11-19 NOTE — DISCHARGE NOTE PROVIDER - NSCORESITESY/N_GEN_A_CORE_RD
Patient presented after waiting 30 minutes with no reaction to  injections. Discharged from clinic.        Alicia Obrien RN on 11/30/2022 at 3:48 PM    
No

## 2023-11-19 NOTE — DISCHARGE NOTE PROVIDER - HOSPITAL COURSE
89 y/o female w/ PMH dementia, depression, anxiety, HTN, HLD w/ PSH b/l TKA w Dr. Baker in 2010's and R IMN w Dr. Flood in 2000's s/p fall this AM w R knee periprosthetic fracture s/p ORIF 11/15/23. Patient tolerated the procedure well without any intraoperative complications. Patient tolerated physical therapy well w NWB BJKI, and the pain was controlled. Pt is weight bearing as tolerated with cane/walker as needed. Seen by medical attending for continuity of care and management and cleared for safe discharge. Keep dressing/incision clean, dry and intact. Any suture/staples to be removed on post-op day #14 at your office visit. Pt is on Xarelto 10 for DVT prophylaxis, please take for 6 weeks unless otherwise instructed by your surgeon. Please follow up with Dr. Monsivais in 1-2 weeks, call the office to make an appointment, [phone]. Please follow up with your PMD for continuity of care and management as medications may have changed.   87 y/o female w/ PMH dementia, depression, anxiety, HTN, HLD w/ PSH b/l TKA w Dr. Baker in 2010's and R IMN w Dr. Flood in 2000's s/p fall this AM w R knee periprosthetic fracture s/p ORIF 11/15/23. Patient tolerated the procedure well without any intraoperative complications. Patient tolerated physical therapy well w NWB BJKI, and the pain was controlled. Pt is weight bearing as tolerated with cane/walker as needed. Seen by medical attending for continuity of care and management and cleared for safe discharge. Keep dressing/incision clean, dry and intact. Any suture/staples to be removed on post-op day #14 at your office visit. Pt is on Xarelto 10 for DVT prophylaxis, please take for 6 weeks unless otherwise instructed by your surgeon. Please follow up with Dr. Monsivais in 1-2 weeks, call the office to make an appointment, [phone]. Please follow up with your PMD for continuity of care and management as medications may have changed.   87 y/o female w/ PMH dementia, depression, anxiety, HTN, HLD w/ PSH b/l TKA w Dr. Baker in 2010's and R IMN w Dr. Flood in 2000's s/p fall this AM w R knee periprosthetic fracture s/p ORIF 11/15/23. Patient tolerated the procedure well without any intraoperative complications. Patient tolerated physical therapy well w NWB BJKI, and the pain was controlled. Pt is weight bearing as tolerated with cane/walker as needed. Seen by medical attending for continuity of care and management and cleared for safe discharge. Keep dressing/incision clean, dry and intact. Any suture/staples to be removed on post-op day #14 at your office visit. Pt is on Xarelto 10 for DVT prophylaxis, please take for 6 weeks unless otherwise instructed by your surgeon. Please follow up with Dr. Monsivais in 1-2 weeks, call the office to make an appointment, [phone]. Please follow up with your PMD for continuity of care and management as medications may have changed.

## 2023-11-19 NOTE — PROVIDER CONTACT NOTE (OTHER) - ASSESSMENT
the side of the breast is painful to touch, and a firm lump is noted  ASHLEY Koehler was on the unit and was asked to take a look at he pt breast

## 2023-11-19 NOTE — DISCHARGE NOTE PROVIDER - NSDCCPCAREPLAN_GEN_ALL_CORE_FT
PRINCIPAL DISCHARGE DIAGNOSIS  Diagnosis: Femur fracture, right  Assessment and Plan of Treatment:

## 2023-11-19 NOTE — PROVIDER CONTACT NOTE (OTHER) - SITUATION
Pt noted to have a lump on the left breast
pt bradycardic at times on telemetry monitor, HR has decreased to 46 at times

## 2023-11-19 NOTE — DISCHARGE NOTE PROVIDER - NSDCMRMEDTOKEN_GEN_ALL_CORE_FT
acetaminophen 325 mg oral tablet: 3 tab(s) orally every 8 hours  atorvastatin 80 mg oral tablet: 1 tab(s) orally once a day (at bedtime)  calcium-vitamin D 500 mg-5 mcg (200 intl units) oral tablet: 1 tab(s) orally 2 times a day  Colace 100 mg oral capsule: 1 cap(s) orally 2 times a day as needed for -for constipation  donepezil 10 mg oral tablet: 1 tab(s) orally once a day  FLUoxetine 40 mg oral capsule: 1 cap(s) orally once a day  Hospital Bed with gel overlay: s/p R knee periprosthetic fx ORIF  Calin lift: s/p right knee periprosthetic fx ORIF. MEJIA = 99m  losartan 50 mg oral tablet: 1 tab(s) orally once a day  Multiple Vitamins oral tablet: 1 tab(s) orally once a day  ondansetron 4 mg oral tablet: 1 tab(s) orally every 8 hours as needed for -for nausea MDD: 3  pantoprazole 40 mg oral delayed release tablet: 1 tab(s) orally once a day (before a meal)  QUEtiapine 150 mg oral tablet, extended release: 1 tab(s) orally once a day (at bedtime)  QUEtiapine 25 mg oral tablet: 1 tab(s) orally 3 times a day As needed home med  rivaroxaban 10 mg oral tablet: 1 tab(s) orally once a day  Ultram 50 mg oral tablet: 1 tab(s) orally every 6 hours as needed for -for severe pain MDD: 4

## 2023-11-19 NOTE — DISCHARGE NOTE PROVIDER - CARE PROVIDER_API CALL
Godwin Monsivais  Orthopaedic Surgery  [address] [place] [address], [facility] [address]  [place] [address]  Phone: [phone]  Fax: [phone]  Follow Up Time:    Godwin Monsivais  Orthopaedic Surgery  [address] [place] [address], [facility] [address]  [place] [address]  Phone: [phone]  Fax: [phone]  Follow Up Time:    Godwin Monsivais  Orthopaedic Surgery  [address] [place] [address], [facility] [address]  [place] [address]  Phone: [phone]  Fax: [phone]  Follow Up Time:

## 2023-11-19 NOTE — PROVIDER CONTACT NOTE (OTHER) - ACTION/TREATMENT ORDERED:
AS per MD Morse  no intervention   AS per ASHLEY Koehler will mention to Attending. AS per MD Morse  no intervention at this time, will relay to team  AS per ASHLEY Koehler will mention to Attending.

## 2023-11-20 DIAGNOSIS — N64.4 MASTODYNIA: ICD-10-CM

## 2023-11-20 LAB
ANION GAP SERPL CALC-SCNC: 10 MMOL/L — SIGNIFICANT CHANGE UP (ref 5–17)
ANION GAP SERPL CALC-SCNC: 10 MMOL/L — SIGNIFICANT CHANGE UP (ref 5–17)
BUN SERPL-MCNC: 11 MG/DL — SIGNIFICANT CHANGE UP (ref 7–23)
BUN SERPL-MCNC: 11 MG/DL — SIGNIFICANT CHANGE UP (ref 7–23)
CALCIUM SERPL-MCNC: 8.9 MG/DL — SIGNIFICANT CHANGE UP (ref 8.4–10.5)
CALCIUM SERPL-MCNC: 8.9 MG/DL — SIGNIFICANT CHANGE UP (ref 8.4–10.5)
CHLORIDE SERPL-SCNC: 105 MMOL/L — SIGNIFICANT CHANGE UP (ref 96–108)
CHLORIDE SERPL-SCNC: 105 MMOL/L — SIGNIFICANT CHANGE UP (ref 96–108)
CO2 SERPL-SCNC: 25 MMOL/L — SIGNIFICANT CHANGE UP (ref 22–31)
CO2 SERPL-SCNC: 25 MMOL/L — SIGNIFICANT CHANGE UP (ref 22–31)
CREAT SERPL-MCNC: 0.78 MG/DL — SIGNIFICANT CHANGE UP (ref 0.5–1.3)
CREAT SERPL-MCNC: 0.78 MG/DL — SIGNIFICANT CHANGE UP (ref 0.5–1.3)
EGFR: 74 ML/MIN/1.73M2 — SIGNIFICANT CHANGE UP
EGFR: 74 ML/MIN/1.73M2 — SIGNIFICANT CHANGE UP
GLUCOSE SERPL-MCNC: 86 MG/DL — SIGNIFICANT CHANGE UP (ref 70–99)
GLUCOSE SERPL-MCNC: 86 MG/DL — SIGNIFICANT CHANGE UP (ref 70–99)
HCT VFR BLD CALC: 32.3 % — LOW (ref 34.5–45)
HCT VFR BLD CALC: 32.3 % — LOW (ref 34.5–45)
HGB BLD-MCNC: 10.7 G/DL — LOW (ref 11.5–15.5)
HGB BLD-MCNC: 10.7 G/DL — LOW (ref 11.5–15.5)
MCHC RBC-ENTMCNC: 31.8 PG — SIGNIFICANT CHANGE UP (ref 27–34)
MCHC RBC-ENTMCNC: 31.8 PG — SIGNIFICANT CHANGE UP (ref 27–34)
MCHC RBC-ENTMCNC: 33.1 GM/DL — SIGNIFICANT CHANGE UP (ref 32–36)
MCHC RBC-ENTMCNC: 33.1 GM/DL — SIGNIFICANT CHANGE UP (ref 32–36)
MCV RBC AUTO: 96.1 FL — SIGNIFICANT CHANGE UP (ref 80–100)
MCV RBC AUTO: 96.1 FL — SIGNIFICANT CHANGE UP (ref 80–100)
NRBC # BLD: 0 /100 WBCS — SIGNIFICANT CHANGE UP (ref 0–0)
NRBC # BLD: 0 /100 WBCS — SIGNIFICANT CHANGE UP (ref 0–0)
PLATELET # BLD AUTO: 231 K/UL — SIGNIFICANT CHANGE UP (ref 150–400)
PLATELET # BLD AUTO: 231 K/UL — SIGNIFICANT CHANGE UP (ref 150–400)
POTASSIUM SERPL-MCNC: 3.6 MMOL/L — SIGNIFICANT CHANGE UP (ref 3.5–5.3)
POTASSIUM SERPL-MCNC: 3.6 MMOL/L — SIGNIFICANT CHANGE UP (ref 3.5–5.3)
POTASSIUM SERPL-SCNC: 3.6 MMOL/L — SIGNIFICANT CHANGE UP (ref 3.5–5.3)
POTASSIUM SERPL-SCNC: 3.6 MMOL/L — SIGNIFICANT CHANGE UP (ref 3.5–5.3)
RBC # BLD: 3.36 M/UL — LOW (ref 3.8–5.2)
RBC # BLD: 3.36 M/UL — LOW (ref 3.8–5.2)
RBC # FLD: 16.4 % — HIGH (ref 10.3–14.5)
RBC # FLD: 16.4 % — HIGH (ref 10.3–14.5)
SODIUM SERPL-SCNC: 140 MMOL/L — SIGNIFICANT CHANGE UP (ref 135–145)
SODIUM SERPL-SCNC: 140 MMOL/L — SIGNIFICANT CHANGE UP (ref 135–145)
WBC # BLD: 11.43 K/UL — HIGH (ref 3.8–10.5)
WBC # BLD: 11.43 K/UL — HIGH (ref 3.8–10.5)
WBC # FLD AUTO: 11.43 K/UL — HIGH (ref 3.8–10.5)
WBC # FLD AUTO: 11.43 K/UL — HIGH (ref 3.8–10.5)

## 2023-11-20 RX ORDER — TRAMADOL HYDROCHLORIDE 50 MG/1
1 TABLET ORAL
Qty: 20 | Refills: 0
Start: 2023-11-20 | End: 2023-11-24

## 2023-11-20 RX ORDER — RIVAROXABAN 15 MG-20MG
1 KIT ORAL
Qty: 30 | Refills: 0
Start: 2023-11-20 | End: 2023-12-19

## 2023-11-20 RX ORDER — DONEPEZIL HYDROCHLORIDE 10 MG/1
1 TABLET, FILM COATED ORAL
Qty: 0 | Refills: 0 | DISCHARGE
Start: 2023-11-20

## 2023-11-20 RX ORDER — ATORVASTATIN CALCIUM 80 MG/1
1 TABLET, FILM COATED ORAL
Qty: 0 | Refills: 0 | DISCHARGE
Start: 2023-11-20

## 2023-11-20 RX ORDER — FLUOXETINE HCL 10 MG
1 CAPSULE ORAL
Qty: 0 | Refills: 0 | DISCHARGE
Start: 2023-11-20

## 2023-11-20 RX ORDER — QUETIAPINE FUMARATE 200 MG/1
1 TABLET, FILM COATED ORAL
Qty: 0 | Refills: 0 | DISCHARGE
Start: 2023-11-20

## 2023-11-20 RX ORDER — LOSARTAN POTASSIUM 100 MG/1
1 TABLET, FILM COATED ORAL
Qty: 0 | Refills: 0 | DISCHARGE
Start: 2023-11-20

## 2023-11-20 RX ORDER — PANTOPRAZOLE SODIUM 20 MG/1
1 TABLET, DELAYED RELEASE ORAL
Qty: 0 | Refills: 0 | DISCHARGE
Start: 2023-11-20

## 2023-11-20 RX ORDER — ACETAMINOPHEN 500 MG
3 TABLET ORAL
Qty: 0 | Refills: 0 | DISCHARGE
Start: 2023-11-20

## 2023-11-20 RX ORDER — ONDANSETRON 8 MG/1
1 TABLET, FILM COATED ORAL
Qty: 21 | Refills: 0
Start: 2023-11-20 | End: 2023-11-26

## 2023-11-20 RX ORDER — DOCUSATE SODIUM 100 MG
1 CAPSULE ORAL
Qty: 14 | Refills: 0
Start: 2023-11-20 | End: 2023-11-26

## 2023-11-20 RX ADMIN — DONEPEZIL HYDROCHLORIDE 10 MILLIGRAM(S): 10 TABLET, FILM COATED ORAL at 08:16

## 2023-11-20 RX ADMIN — QUETIAPINE FUMARATE 150 MILLIGRAM(S): 200 TABLET, FILM COATED ORAL at 17:32

## 2023-11-20 RX ADMIN — Medication 975 MILLIGRAM(S): at 21:24

## 2023-11-20 RX ADMIN — Medication 975 MILLIGRAM(S): at 14:15

## 2023-11-20 RX ADMIN — MEMANTINE HYDROCHLORIDE 5 MILLIGRAM(S): 10 TABLET ORAL at 21:24

## 2023-11-20 RX ADMIN — Medication 975 MILLIGRAM(S): at 14:05

## 2023-11-20 RX ADMIN — Medication 975 MILLIGRAM(S): at 06:45

## 2023-11-20 RX ADMIN — Medication 1 TABLET(S): at 17:32

## 2023-11-20 RX ADMIN — ATORVASTATIN CALCIUM 80 MILLIGRAM(S): 80 TABLET, FILM COATED ORAL at 21:24

## 2023-11-20 RX ADMIN — TRAMADOL HYDROCHLORIDE 50 MILLIGRAM(S): 50 TABLET ORAL at 12:00

## 2023-11-20 RX ADMIN — TRAMADOL HYDROCHLORIDE 50 MILLIGRAM(S): 50 TABLET ORAL at 10:57

## 2023-11-20 RX ADMIN — Medication 3 MILLIGRAM(S): at 21:24

## 2023-11-20 RX ADMIN — Medication 500 MILLIGRAM(S): at 08:16

## 2023-11-20 RX ADMIN — PANTOPRAZOLE SODIUM 40 MILLIGRAM(S): 20 TABLET, DELAYED RELEASE ORAL at 05:47

## 2023-11-20 RX ADMIN — QUETIAPINE FUMARATE 25 MILLIGRAM(S): 200 TABLET, FILM COATED ORAL at 09:38

## 2023-11-20 RX ADMIN — Medication 1 TABLET(S): at 12:34

## 2023-11-20 RX ADMIN — RIVAROXABAN 10 MILLIGRAM(S): KIT at 12:34

## 2023-11-20 RX ADMIN — Medication 975 MILLIGRAM(S): at 22:24

## 2023-11-20 RX ADMIN — Medication 975 MILLIGRAM(S): at 05:47

## 2023-11-20 RX ADMIN — Medication 40 MILLIGRAM(S): at 08:17

## 2023-11-20 RX ADMIN — DONEPEZIL HYDROCHLORIDE 10 MILLIGRAM(S): 10 TABLET, FILM COATED ORAL at 21:24

## 2023-11-20 RX ADMIN — LOSARTAN POTASSIUM 50 MILLIGRAM(S): 100 TABLET, FILM COATED ORAL at 05:46

## 2023-11-20 RX ADMIN — MEMANTINE HYDROCHLORIDE 5 MILLIGRAM(S): 10 TABLET ORAL at 08:16

## 2023-11-20 RX ADMIN — Medication 1 TABLET(S): at 06:42

## 2023-11-20 NOTE — CHART NOTE - NSCHARTNOTESELECT_GEN_ALL_CORE
[facility] Bed with gel overlay and Calin lift./Event Note [facility] Bed with gel overlay and Calin lift./Event Note [facility] Bed with gel overlay and Calin lift./Event Note

## 2023-11-20 NOTE — CHART NOTE - NSCHARTNOTEFT_GEN_A_CORE
Patient will require a semi electric hospital bed due to s/p right knee periprosthetic fx ORIF.  Patient requires the head of the bed to be elevated more than 30 degrees.  Pillows and wedges are not effective.  Patient requires positioning of the body in ways not feasible with an ordinary bed.  Patient requires frequent repositioning to alleviate pain.  The member can independently affect the adjustment by operating the control.     Gel overlay required as well as the beneficiary has limited mobility – i.e., beneficiary cannot independently make changes in body position significant enough to alleviate pressure and at least one of conditions below,       Patient requires a Calin lift for transfers between bed and (chair, wheelchair, or commode).  Without the use of a lift, the patient would be bed confined.     Kaci Gan PA-C  Orthopedic Surgery  Team Pager: [phone] Patient will require a semi electric hospital bed due to s/p right knee periprosthetic fx ORIF.  Patient requires the head of the bed to be elevated more than 30 degrees.  Pillows and wedges are not effective.  Patient requires positioning of the body in ways not feasible with an ordinary bed.  Patient requires frequent repositioning to alleviate pain.  The member can independently affect the adjustment by operating the control.     Gel overlay required as well as the beneficiary has limited mobility – i.e., beneficiary cannot independently make changes in body position significant enough to alleviate pressure and at least one of conditions below,       Patient requires a Calin lift for transfers between bed and (chair, wheelchair, or commode).  Without the use of a lift, the patient would be bed confined.     Kaci Gan PA-C  Orthopedic Surgery  Team Pager: [phone] Patient will require a semi electric hospital bed due to s/p right knee periprosthetic fx ORIF.  Patient requires the head of the bed to be elevated more than 30 degrees.  Pillows and wedges are not effective.  Patient requires positioning of the body in ways not feasible with an ordinary bed.  Patient requires frequent repositioning to alleviate pain.  The member can independently affect the adjustment by operating the control.     Gel overlay required as well as the beneficiary has limited mobility – i.e., beneficiary cannot independently make changes in body position significant enough to alleviate pressure and at least one of conditions below,       Patient requires a Calin lift for transfers between bed and (chair, wheelchair, or commode).  Without the use of a lift, the patient would be bed confined.     Kaci Gan PA-C  Orthopedic Surgery  Team Pager: [phone]

## 2023-11-21 ENCOUNTER — TRANSCRIPTION ENCOUNTER (OUTPATIENT)
Age: 88
End: 2023-11-21

## 2023-11-21 VITALS
SYSTOLIC BLOOD PRESSURE: 104 MMHG | TEMPERATURE: 98 F | RESPIRATION RATE: 18 BRPM | OXYGEN SATURATION: 98 % | DIASTOLIC BLOOD PRESSURE: 74 MMHG | HEART RATE: 62 BPM

## 2023-11-21 RX ORDER — AMOXICILLIN 250 MG/5ML
1 SUSPENSION, RECONSTITUTED, ORAL (ML) ORAL
Qty: 28 | Refills: 0
Start: 2023-11-21 | End: 2023-12-04

## 2023-11-21 RX ORDER — RIVAROXABAN 15 MG-20MG
1 KIT ORAL
Qty: 14 | Refills: 0
Start: 2023-11-21 | End: 2023-12-04

## 2023-11-21 RX ORDER — OXYCODONE HYDROCHLORIDE 5 MG/1
1 TABLET ORAL
Qty: 30 | Refills: 0
Start: 2023-11-21 | End: 2023-11-30

## 2023-11-21 RX ADMIN — MEMANTINE HYDROCHLORIDE 5 MILLIGRAM(S): 10 TABLET ORAL at 08:05

## 2023-11-21 RX ADMIN — Medication 975 MILLIGRAM(S): at 05:26

## 2023-11-21 RX ADMIN — DONEPEZIL HYDROCHLORIDE 10 MILLIGRAM(S): 10 TABLET, FILM COATED ORAL at 10:07

## 2023-11-21 RX ADMIN — TRAMADOL HYDROCHLORIDE 50 MILLIGRAM(S): 50 TABLET ORAL at 10:07

## 2023-11-21 RX ADMIN — Medication 40 MILLIGRAM(S): at 08:05

## 2023-11-21 RX ADMIN — Medication 500 MILLIGRAM(S): at 08:05

## 2023-11-21 RX ADMIN — Medication 1 TABLET(S): at 05:26

## 2023-11-21 RX ADMIN — RIVAROXABAN 10 MILLIGRAM(S): KIT at 10:07

## 2023-11-21 RX ADMIN — PANTOPRAZOLE SODIUM 40 MILLIGRAM(S): 20 TABLET, DELAYED RELEASE ORAL at 05:26

## 2023-11-21 RX ADMIN — LOSARTAN POTASSIUM 50 MILLIGRAM(S): 100 TABLET, FILM COATED ORAL at 05:27

## 2023-11-21 RX ADMIN — QUETIAPINE FUMARATE 25 MILLIGRAM(S): 200 TABLET, FILM COATED ORAL at 10:07

## 2023-11-21 RX ADMIN — Medication 975 MILLIGRAM(S): at 06:26

## 2023-11-21 RX ADMIN — Medication 1 TABLET(S): at 10:07

## 2023-11-21 NOTE — PROGRESS NOTE ADULT - PROBLEM SELECTOR PROBLEM 1
Right femoral shaft fracture

## 2023-11-21 NOTE — PROGRESS NOTE ADULT - PROBLEM SELECTOR PROBLEM 3
HTN (hypertension)
Dementia
HTN (hypertension)

## 2023-11-21 NOTE — PROGRESS NOTE ADULT - PROBLEM SELECTOR PLAN 4
continue memantine, donepezil and Seroquel  increase Seroquel dose if needed for increased agitation  continue to reorient Patient as needed.
Pain meds as needed  follow for oversedation  GI regime to prevent constipation.
continue memantine, donepezil and Seroquel  increase Seroquel dose if needed for increased agitation  continue to reorient Patient as needed.

## 2023-11-21 NOTE — PROGRESS NOTE ADULT - PROBLEM SELECTOR PROBLEM 7
ABLA (acute blood loss anemia)

## 2023-11-21 NOTE — PROGRESS NOTE ADULT - PROVIDER SPECIALTY LIST ADULT
Cardiology
Orthopedics
Internal Medicine

## 2023-11-21 NOTE — DISCHARGE NOTE NURSING/CASE MANAGEMENT/SOCIAL WORK - NSDCPEFALRISK_GEN_ALL_CORE
For information on Fall & Injury Prevention, visit: [email or web address] OR  [email or web address] OR  https://www.cdc.gov/steadi/patient.html

## 2023-11-21 NOTE — PROGRESS NOTE ADULT - PROBLEM SELECTOR PLAN 6
continue fluoxitine and clonazepam   clarified dose of clonazepam   Psych eval as needed.

## 2023-11-21 NOTE — PROGRESS NOTE ADULT - PROBLEM SELECTOR PLAN 2
Patient with new onset afib  Has been back and forth between afib and NSR  Holding further rate control due to bradycardia  follow for Tachy Nick syndrome  would eventually benefit from AC  Echo with an EF of 75%
Patient with new onset afib  Has been back and forth between afib and NSR  Holding further rate control due to bradycardia  follow for Tachy Nick syndrome  would eventually benefit from AC
Patient with new onset afib  Has been back and forth between afib and NSR  Holding further rate control due to bradycardia  follow for Tachy Nick syndrome  would eventually benefit from AC  Echo with an EF of 75%
continue losartan 50mg daily  continue to monitor BP  will adjust meds as needed  Low sodium diet.

## 2023-11-21 NOTE — PROGRESS NOTE ADULT - PROBLEM SELECTOR PLAN 8
continue GI regime for constipation  Lactulose as needed   continue to monitor

## 2023-11-21 NOTE — PROGRESS NOTE ADULT - TIME BILLING
Discussed treatment plan with patient and daughter at bedside.
new afib  anticoagulation
Discussed treatment plan with patient and aide at bedside.
Discussed treatment plan with patient and aide at bedside.
Discussed treatment plan with patient and daughter at bedside.
Discussed treatment plan with patient and daughter at bedside.
Patient DCed home  continue current meds  Po as tolerated  activity as tolerated  follow up with ortho and PMD  Pts daughter aware of plan
Discussed treatment plan with patient and aide at bedside.

## 2023-11-21 NOTE — PROGRESS NOTE ADULT - PROBLEM SELECTOR PLAN 3
continue losartan 50mg daily  continue to monitor BP  will adjust meds as needed  Low sodium diet.
continue losartan 50mg daily  continue to monitor BP  will adjust meds as needed  Low sodium diet.
continue losartan 50mg daily  continue to monitor BP  will adjust meds as needed  Low sodium diet.  BP has been well controlled
continue memantine, donepezil and Seroquel  increase Seroquel dose if needed for increased agitation  continue to reorient Patient as needed.
continue losartan 50mg daily  continue to monitor BP  will adjust meds as needed  Low sodium diet.  BP has been well controlled

## 2023-11-21 NOTE — PROGRESS NOTE ADULT - PROBLEM SELECTOR PLAN 1
Patient s/p an Open reduction and internal fixation of the right femur  tolerated the procedure well  PO as tolerated  DVT and GI prophylaxis as per ortho.
Patient s/p an Open reduction and internal fixation of the right femur  tolerated the procedure well  PO as tolerated  DVT and GI prophylaxis as per ortho.  physical therapy as tolerated
Patient s/p an Open reduction and internal fixation of the right femur  tolerated the procedure well  PO as tolerated  DVT and GI prophylaxis as per ortho.
Patient s/p an Open reduction and internal fixation of the right femur  tolerated the procedure well  PO as tolerated  DVT and GI prophylaxis as per ortho.  physical therapy as tolerated

## 2023-11-21 NOTE — PROGRESS NOTE ADULT - PROBLEM SELECTOR PLAN 5
Pain meds as needed  follow for oversedation  GI regime to prevent constipation.
Pain meds as needed  follow for oversedation  GI regime to prevent constipation.
continue fluoxitine and clonazepam   Psych eval as needed.
Pain meds as needed  follow for oversedation  GI regime to prevent constipation.

## 2023-11-21 NOTE — PROGRESS NOTE ADULT - SUBJECTIVE AND OBJECTIVE BOX
Patient seen and examined at bedside.  No acute complaints at this time. Pain well controlled. Denies chest pain, shortness of breath, nausea or vomiting.     PE:  Vital Signs Last 24 Hrs  T(C): 36.6 (11-15-23 @ 05:18), Max: 37.1 (11-15-23 @ 01:04)  T(F): 97.9 (11-15-23 @ 05:18), Max: 98.7 (11-15-23 @ 01:04)  HR: 67 (11-15-23 @ 05:18) (66 - 90)  BP: 92/58 (11-15-23 @ 05:18) (92/58 - 112/78)  BP(mean): 74 (11-14-23 @ 17:00) (74 - 89)  RR: 18 (11-15-23 @ 05:18) (14 - 18)  SpO2: 96% (11-15-23 @ 05:18) (95% - 99%)    General: NAD, resting comfortably in bed  RLE:   BJKI  No calf tenderness   TA/EHL/FHL/GSC+  SILT L2-S1  DP/PT 2+        PT/INR - ( 15 Nov 2023 01:57 )   PT: 12.2 sec;   INR: 1.17 ratio         PTT - ( 15 Nov 2023 01:57 )  PTT:25.0 sec    A/P:  88y f R knee periprosthetic fx   OR today   NPO  FU am labs   Hold chemical DVT ppx  BJKI  medical clearance noted  will d/w attending     Ortho
Pt seen/examined. Doing well. Pain controlled. No acute overnight complaints or events.    T(C): 37 (11-17-23 @ 21:25), Max: 37 (11-17-23 @ 21:25)  HR: 58 (11-17-23 @ 21:25) (58 - 78)  BP: 113/73 (11-17-23 @ 21:25) (94/58 - 113/73)  RR: 18 (11-17-23 @ 21:25) (18 - 18)  SpO2: 98% (11-17-23 @ 21:25) (96% - 99%)  Wt(kg): --  - Gen: NAD    Physical Examination:  GEN: NAD, resting quietly  PULM: symmetric chest rise bilaterally, no increased WOB  ABD: nondistended  RLE, dressing c/d/i       SILT DP/SP/ Abad/Saph/tib       +EHL/FHL/TA/Gastroc,        DP+,        soft compartments, no calf ttp         A/P: S/p ORIF, fracture, distal femur on 11/15    - f/u H/h s/p 2U  - NWB in RLE BJKI  -DVT PPx: Xarelto 10mg QD  -Pain Control PO/IV Pain Rx-- minimize narcotics  -Continue Current Tx        ** Home Rx confirmed w/ daughter  -Dispo planning: TBD
  ORTHOPEDIC PROGRESS NOTE    Overnight events: None    SUBJECTIVE: Pt seen and examined at bedside. Patient is doing well, no acute complaints this AM. Pain is controlled with medication      OBJECTIVE:  Vital Signs Last 24 Hrs  T(C): 36.6 (17 Nov 2023 05:15), Max: 37.6 (16 Nov 2023 13:42)  T(F): 97.9 (17 Nov 2023 05:15), Max: 99.7 (16 Nov 2023 13:42)  HR: 73 (17 Nov 2023 05:15) (60 - 84)  BP: 106/69 (17 Nov 2023 05:15) (93/63 - 138/72)  BP(mean): --  RR: 18 (17 Nov 2023 05:15) (18 - 18)  SpO2: 99% (17 Nov 2023 05:15) (95% - 100%)    Parameters below as of 17 Nov 2023 05:15  Patient On (Oxygen Delivery Method): nasal cannula  O2 Flow (L/min): 2        11-16-23 @ 07:01  -  11-17-23 @ 07:00  --------------------------------------------------------  IN: 800 mL / OUT: 1135 mL / NET: -335 mL        Physical Examination:  GEN: NAD, resting quietly  PULM: symmetric chest rise bilaterally, no increased WOB  ABD: nondistended  EXTR:       LABS:                        6.9    6.64  )-----------( 122      ( 17 Nov 2023 07:09 )             21.6       11-17    138  |  104  |  19  ----------------------------<  98  3.8   |  27  |  1.07    Ca    8.3<L>      17 Nov 2023 07:10        
 87 y/o female w/ PMH dementia, depression, anxiety, HTN, HLD w/ PSH knee/hip replacement c/o fall this AM. Pt was walking out of the restroom, felt her leg give out, and sat down. Pt twisted her right leg standing up, causing pain. Pt was diagnosed w/ a right femur fracture by PCP XRs, prompting ED arrival. Pt is otherwise asymptomatic.  Pt admits to lightheadedness and nausea, otherwise asymptomatic. Denies fevers, chills, vomiting, chest pain, SOB, abdominal pain, dysuria, hematuria. Pt seen now resting comfortably. Patient seen post op after a right periprosthetic femur fracture. Pt tolerated the procedure      MEDICATIONS  (STANDING):  ceFAZolin   IVPB 2000 milliGRAM(s) IV Intermittent every 8 hours  donepezil 10 milliGRAM(s) Oral at bedtime  FLUoxetine 40 milliGRAM(s) Oral daily  lactated ringers. 1000 milliLiter(s) (100 mL/Hr) IV Continuous <Continuous>  losartan 50 milliGRAM(s) Oral daily  pantoprazole    Tablet 40 milliGRAM(s) Oral before breakfast    MEDICATIONS  (PRN):  acetaminophen     Tablet .. 650 milliGRAM(s) Oral every 4 hours PRN Temp greater or equal to 38C (100.4F)  HYDROmorphone  Injectable 0.25 milliGRAM(s) IV Push every 10 minutes PRN Moderate Pain (4 - 6)  ondansetron   Disintegrating Tablet 4 milliGRAM(s) Oral every 12 hours PRN Nausea and/or Vomiting  ondansetron Injectable 4 milliGRAM(s) IV Push every 6 hours PRN Nausea and/or Vomiting  ondansetron Injectable 4 milliGRAM(s) IV Push once PRN Nausea and/or Vomiting  oxyCODONE    IR 2.5 milliGRAM(s) Oral every 4 hours PRN Moderate Pain (4 - 6)  oxyCODONE    IR 5 milliGRAM(s) Oral every 4 hours PRN Severe Pain (7 - 10)  traMADol 50 milliGRAM(s) Oral every 4 hours PRN Mild Pain (1 - 3)          VITALS:   T(C): 36.6 (11-15-23 @ 14:37), Max: 37.3 (11-15-23 @ 11:19)  HR: 74 (11-15-23 @ 15:30) (66 - 91)  BP: 92/53 (11-15-23 @ 15:30) (90/53 - 128/84)  RR: 18 (11-15-23 @ 15:30) (16 - 19)  SpO2: 99% (11-15-23 @ 15:30) (93% - 100%)  Wt(kg): --      PHYSICAL EXAM:  GENERAL: NAD, well-groomed, well-developed  HEAD:  Atraumatic, Normocephalic  EYES: EOMI, PERRLA, conjunctiva and sclera clear  ENMT: No tonsillar erythema, exudates, or enlargement; Moist mucous membranes, Good dentition, No lesions  NECK: Supple, No JVD, Normal thyroid  NERVOUS SYSTEM:  Alert & Oriented X1,  CHEST/LUNG: Clear to percussion bilaterally; No rales, rhonchi, wheezing, or rubs  HEART: Regular rate and rhythm; No murmurs, rubs, or gallops  ABDOMEN: Soft, Nontender, Nondistended; Bowel sounds present  EXTREMITIES:  2+ Peripheral Pulses, No clubbing, cyanosis, or edema  LYMPH: No lymphadenopathy noted  SKIN: No rashes or lesions    LABS:        CBC Full  -  ( 15 Nov 2023 01:57 )  WBC Count : 10.29 K/uL  RBC Count : 2.95 M/uL  Hemoglobin : 9.7 g/dL  Hematocrit : 29.3 %  Platelet Count - Automated : 169 K/uL  Mean Cell Volume : 99.3 fl  Mean Cell Hemoglobin : 32.9 pg  Mean Cell Hemoglobin Concentration : 33.1 gm/dL  Auto Neutrophil # : x  Auto Lymphocyte # : x  Auto Monocyte # : x  Auto Eosinophil # : x  Auto Basophil # : x  Auto Neutrophil % : x  Auto Lymphocyte % : x  Auto Monocyte % : x  Auto Eosinophil % : x  Auto Basophil % : x    11-15    137  |  103  |  23  ----------------------------<  119<H>  4.2   |  24  |  1.52<H>    Ca    9.0      15 Nov 2023 01:57    TPro  6.7  /  Alb  3.6  /  TBili  0.7  /  DBili  x   /  AST  25  /  ALT  13  /  AlkPhos  68  11-14    LIVER FUNCTIONS - ( 14 Nov 2023 14:42 )  Alb: 3.6 g/dL / Pro: 6.7 g/dL / ALK PHOS: 68 U/L / ALT: 13 U/L / AST: 25 U/L / GGT: x           PT/INR - ( 15 Nov 2023 01:57 )   PT: 12.2 sec;   INR: 1.17 ratio         PTT - ( 15 Nov 2023 01:57 )  PTT:25.0 sec  Urinalysis Basic - ( 15 Nov 2023 01:57 )    Color: x / Appearance: x / SG: x / pH: x  Gluc: 119 mg/dL / Ketone: x  / Bili: x / Urobili: x   Blood: x / Protein: x / Nitrite: x   Leuk Esterase: x / RBC: x / WBC x   Sq Epi: x / Non Sq Epi: x / Bacteria: x      CAPILLARY BLOOD GLUCOSE          RADIOLOGY & ADDITIONAL TESTS:      
 89 y/o female w/ PMH dementia, depression, anxiety, HTN, HLD w/ PSH knee/hip replacement c/o fall this AM. Pt was walking out of the restroom, felt her leg give out, and sat down. Pt twisted her right leg standing up, causing pain. Pt was diagnosed w/ a right femur fracture by PCP XRs, prompting ED arrival. Pt is otherwise asymptomatic.  Pt admits to lightheadedness and nausea, otherwise asymptomatic. Denies fevers, chills, vomiting, chest pain, SOB, abdominal pain, dysuria, hematuria. Pt seen now resting comfortably. Patient seen post op after a right periprosthetic femur fracture. Pt tolerated the procedure. Patient found to be in New onset afib. Patient started on lopressor and converted to NSR. Patient states pain has been managed.       MEDICATIONS  (STANDING):  acetaminophen     Tablet .. 975 milliGRAM(s) Oral every 8 hours  ascorbic acid 500 milliGRAM(s) Oral daily  atorvastatin 80 milliGRAM(s) Oral at bedtime  calcium carbonate 1250 mG  + Vitamin D (OsCal 500 + D) 1 Tablet(s) Oral two times a day  donepezil 10 milliGRAM(s) Oral <User Schedule>  famotidine    Tablet 40 milliGRAM(s) Oral two times a day  famotidine Injectable 20 milliGRAM(s) IV Push daily  FLUoxetine 40 milliGRAM(s) Oral daily  lactated ringers. 1000 milliLiter(s) (100 mL/Hr) IV Continuous <Continuous>  losartan 50 milliGRAM(s) Oral daily  melatonin 3 milliGRAM(s) Oral at bedtime  memantine 5 milliGRAM(s) Oral every 12 hours  multivitamin 1 Tablet(s) Oral daily  pantoprazole    Tablet 40 milliGRAM(s) Oral before breakfast  polyethylene glycol 3350 17 Gram(s) Oral daily  QUEtiapine  milliGRAM(s) Oral <User Schedule>  rivaroxaban 10 milliGRAM(s) Oral daily  senna 1 Tablet(s) Oral at bedtime    MEDICATIONS  (PRN):  clonazePAM  Tablet 0.5 milliGRAM(s) Oral two times a day PRN anxiety  ondansetron   Disintegrating Tablet 4 milliGRAM(s) Oral every 12 hours PRN Nausea and/or Vomiting  ondansetron Injectable 4 milliGRAM(s) IV Push every 6 hours PRN Nausea and/or Vomiting  QUEtiapine 25 milliGRAM(s) Oral three times a day PRN home med  traMADol 50 milliGRAM(s) Oral every 8 hours PRN Severe Pain (7 - 10)  traMADol 25 milliGRAM(s) Oral every 8 hours PRN Moderate Pain (4 - 6)          VITALS:   T(C): 36.7 (11-17-23 @ 16:11), Max: 36.9 (11-16-23 @ 21:50)  HR: 78 (11-17-23 @ 16:11) (61 - 78)  BP: 109/57 (11-17-23 @ 16:11) (93/63 - 109/57)  RR: 18 (11-17-23 @ 16:11) (18 - 18)  SpO2: 96% (11-17-23 @ 16:11) (95% - 99%)  Wt(kg): --    PHYSICAL EXAM:  GENERAL: NAD, well-groomed, well-developed  HEAD:  Atraumatic, Normocephalic  EYES: EOMI, PERRLA, conjunctiva and sclera clear  ENMT: No tonsillar erythema, exudates, or enlargement; Moist mucous membranes, Good dentition, No lesions  NECK: Supple, No JVD, Normal thyroid  NERVOUS SYSTEM:  Alert & Oriented X1,  CHEST/LUNG: Clear to percussion bilaterally; No rales, rhonchi, wheezing, or rubs  HEART: Regular rate and rhythm; No murmurs, rubs, or gallops  ABDOMEN: Soft, Nontender, Nondistended; Bowel sounds present  EXTREMITIES:  2+ Peripheral Pulses, No clubbing, cyanosis, or edema  LYMPH: No lymphadenopathy noted  SKIN: No rashes or lesions    LABS:        CBC Full  -  ( 17 Nov 2023 07:09 )  WBC Count : 6.64 K/uL  RBC Count : 2.11 M/uL  Hemoglobin : 6.9 g/dL  Hematocrit : 21.6 %  Platelet Count - Automated : 122 K/uL  Mean Cell Volume : 102.4 fl  Mean Cell Hemoglobin : 32.7 pg  Mean Cell Hemoglobin Concentration : 31.9 gm/dL  Auto Neutrophil # : x  Auto Lymphocyte # : x  Auto Monocyte # : x  Auto Eosinophil # : x  Auto Basophil # : x  Auto Neutrophil % : x  Auto Lymphocyte % : x  Auto Monocyte % : x  Auto Eosinophil % : x  Auto Basophil % : x    11-17    138  |  104  |  19  ----------------------------<  98  3.8   |  27  |  1.07    Ca    8.3<L>      17 Nov 2023 07:10          Urinalysis Basic - ( 17 Nov 2023 07:10 )    Color: x / Appearance: x / SG: x / pH: x  Gluc: 98 mg/dL / Ketone: x  / Bili: x / Urobili: x   Blood: x / Protein: x / Nitrite: x   Leuk Esterase: x / RBC: x / WBC x   Sq Epi: x / Non Sq Epi: x / Bacteria: x      CAPILLARY BLOOD GLUCOSE          RADIOLOGY & ADDITIONAL TESTS:      
Pt seen/examined. Doing well. Pain controlled. No acute overnight complaints or events.    T(C): 36.9 (11-21-23 @ 05:20), Max: 36.9 (11-20-23 @ 17:46)  HR: 62 (11-21-23 @ 05:20) (60 - 81)  BP: 127/71 (11-21-23 @ 05:20) (106/66 - 127/71)  RR: 18 (11-21-23 @ 05:20) (18 - 18)  SpO2: 96% (11-21-23 @ 05:20) (94% - 99%)  Wt(kg): --  - Gen: NAD    Physical Examination:  GEN: NAD, resting quietly  PULM: symmetric chest rise bilaterally, no increased WOB  ABD: nondistended  TTP over L breast, no erythema/drainage. No mass, fluctuance  EXTR:   RLE, dressing c/d/i       SILT DP/SP/ Abad/Saph/tib       +EHL/FHL/TA/Gastroc,        DP+,        soft compartments, no calf ttp    A/P: S/p ORIF, fracture, distal femur on 11/15    - FU AM labs  - NWB in RLE BJKI  -DVT PPx: Xarelto 10mg QD  -Pain Control PO/IV Pain Rx-- minimize narcotics  -Continue Current Tx        ** Home Rx confirmed w/ daughter  -Dispo planning: [place]  - f/u outpatient for breast cellulitis, PO abx
Pt seen/examined. Doing well. Pain controlled. No acute overnight complaints or events.    T(C): 37.2 (11-20-23 @ 01:10), Max: 37.2 (11-19-23 @ 22:13)  HR: 66 (11-20-23 @ 01:10) (66 - 108)  BP: 118/83 (11-20-23 @ 01:10) (104/71 - 145/85)  RR: 18 (11-20-23 @ 01:10) (18 - 18)  SpO2: 98% (11-20-23 @ 01:10) (97% - 98%)  Wt(kg): --  - Gen: NAD    Physical Examination:  GEN: NAD, resting quietly  PULM: symmetric chest rise bilaterally, no increased WOB  ABD: nondistended  EXTR:   RLE, dressing c/d/i       SILT DP/SP/ Abad/Saph/tib       +EHL/FHL/TA/Gastroc,        DP+,        soft compartments, no calf ttp    A/P: S/p ORIF, fracture, distal femur on 11/15    - FU AM labs  - NWB in RLE BJKI  -DVT PPx: Xarelto 10mg QD  -Pain Control PO/IV Pain Rx-- minimize narcotics  -Continue Current Tx        ** Home Rx confirmed w/ daughter  -Dispo planning: TBD
S/24 Events: Patient seen and examined. Denies CP, SOB, dizziness, LH, near syncope or sycope.   No acute events overnight.   Telemetry : -  O:  T(C): 36.6 (11-17-23 @ 05:15), Max: 37.6 (11-16-23 @ 13:42)  HR: 73 (11-17-23 @ 05:15) (63 - 84)  BP: 106/69 (11-17-23 @ 05:15) (93/63 - 138/72)  RR: 18 (11-17-23 @ 05:15) (18 - 18)  SpO2: 99% (11-17-23 @ 05:15) (95% - 100%)    Daily     Daily   Gen: NAD  HEENT: EOMI  CV: RRR, normal S1 + S2, no m/r/g  Lungs: CTAB  Abd: soft, non-tender  Ext: No edema    Labs:                        6.9    6.64  )-----------( 122      ( 17 Nov 2023 07:09 )             21.6     11-17    138  |  104  |  19  ----------------------------<  98  3.8   |  27  |  1.07    Ca    8.3<L>      17 Nov 2023 07:10             DIagnostics :       Meds:  MEDICATIONS  (STANDING):  acetaminophen     Tablet .. 975 milliGRAM(s) Oral every 8 hours  ascorbic acid 500 milliGRAM(s) Oral daily  atorvastatin 80 milliGRAM(s) Oral at bedtime  calcium carbonate 1250 mG  + Vitamin D (OsCal 500 + D) 1 Tablet(s) Oral two times a day  donepezil 10 milliGRAM(s) Oral <User Schedule>  FLUoxetine 40 milliGRAM(s) Oral daily  lactated ringers. 1000 milliLiter(s) (100 mL/Hr) IV Continuous <Continuous>  losartan 50 milliGRAM(s) Oral daily  melatonin 3 milliGRAM(s) Oral at bedtime  memantine 5 milliGRAM(s) Oral every 12 hours  multivitamin 1 Tablet(s) Oral daily  pantoprazole    Tablet 40 milliGRAM(s) Oral before breakfast  polyethylene glycol 3350 17 Gram(s) Oral daily  QUEtiapine  milliGRAM(s) Oral <User Schedule>  rivaroxaban 10 milliGRAM(s) Oral daily  senna 1 Tablet(s) Oral at bedtime      A/P:        
 89 y/o female w/ PMH dementia, depression, anxiety, HTN, HLD w/ PSH knee/hip replacement c/o fall this AM. Pt was walking out of the restroom, felt her leg give out, and sat down. Pt twisted her right leg standing up, causing pain. Pt was diagnosed w/ a right femur fracture by PCP XRs, prompting ED arrival. Pt is otherwise asymptomatic.  Pt admits to lightheadedness and nausea, otherwise asymptomatic. Denies fevers, chills, vomiting, chest pain, SOB, abdominal pain, dysuria, hematuria. Pt seen now resting comfortably. Patient seen post op after a right periprosthetic femur fracture. Pt tolerated the procedure. Patient found to be in New onset afib. Patient started on lopressor and converted to NSR. Patient states pain has been managed.     MEDICATIONS  (STANDING):  acetaminophen     Tablet .. 975 milliGRAM(s) Oral every 8 hours  ascorbic acid 500 milliGRAM(s) Oral daily  atorvastatin 80 milliGRAM(s) Oral at bedtime  calcium carbonate 1250 mG  + Vitamin D (OsCal 500 + D) 1 Tablet(s) Oral two times a day  donepezil 10 milliGRAM(s) Oral <User Schedule>  famotidine    Tablet 40 milliGRAM(s) Oral two times a day  famotidine Injectable 20 milliGRAM(s) IV Push daily  FLUoxetine 40 milliGRAM(s) Oral daily  lactated ringers. 1000 milliLiter(s) (100 mL/Hr) IV Continuous <Continuous>  losartan 50 milliGRAM(s) Oral daily  melatonin 3 milliGRAM(s) Oral at bedtime  memantine 5 milliGRAM(s) Oral every 12 hours  multivitamin 1 Tablet(s) Oral daily  pantoprazole    Tablet 40 milliGRAM(s) Oral before breakfast  polyethylene glycol 3350 17 Gram(s) Oral daily  QUEtiapine  milliGRAM(s) Oral <User Schedule>  rivaroxaban 10 milliGRAM(s) Oral daily  senna 1 Tablet(s) Oral at bedtime    MEDICATIONS  (PRN):  clonazePAM  Tablet 0.5 milliGRAM(s) Oral two times a day PRN anxiety  ondansetron   Disintegrating Tablet 4 milliGRAM(s) Oral every 12 hours PRN Nausea and/or Vomiting  ondansetron Injectable 4 milliGRAM(s) IV Push every 6 hours PRN Nausea and/or Vomiting  QUEtiapine 25 milliGRAM(s) Oral three times a day PRN home med  traMADol 50 milliGRAM(s) Oral every 8 hours PRN Severe Pain (7 - 10)  traMADol 25 milliGRAM(s) Oral every 8 hours PRN Moderate Pain (4 - 6)          VITALS:   T(C): 36.6 (11-18-23 @ 13:13), Max: 37 (11-17-23 @ 21:25)  HR: 62 (11-18-23 @ 13:13) (57 - 78)  BP: 137/71 (11-18-23 @ 13:13) (109/57 - 137/71)  RR: 18 (11-18-23 @ 13:13) (18 - 18)  SpO2: 95% (11-18-23 @ 13:13) (95% - 98%)  Wt(kg): --    PHYSICAL EXAM:  GENERAL: NAD, well-groomed, well-developed  HEAD:  Atraumatic, Normocephalic  EYES: EOMI, PERRLA, conjunctiva and sclera clear  ENMT: No tonsillar erythema, exudates, or enlargement; Moist mucous membranes, Good dentition, No lesions  NECK: Supple, No JVD, Normal thyroid  NERVOUS SYSTEM:  Alert & Oriented X1,  CHEST/LUNG: Clear to percussion bilaterally; No rales, rhonchi, wheezing, or rubs  HEART: Regular rate and rhythm; No murmurs, rubs, or gallops  ABDOMEN: Soft, Nontender, Nondistended; Bowel sounds present  EXTREMITIES:  2+ Peripheral Pulses, No clubbing, cyanosis, or edema  LYMPH: No lymphadenopathy noted  SKIN: No rashes or lesions    LABS:        CBC Full  -  ( 18 Nov 2023 07:19 )  WBC Count : 5.80 K/uL  RBC Count : 3.06 M/uL  Hemoglobin : 9.7 g/dL  Hematocrit : 31.9 %  Platelet Count - Automated : 123 K/uL  Mean Cell Volume : 104.2 fl  Mean Cell Hemoglobin : 31.7 pg  Mean Cell Hemoglobin Concentration : 30.4 gm/dL  Auto Neutrophil # : x  Auto Lymphocyte # : x  Auto Monocyte # : x  Auto Eosinophil # : x  Auto Basophil # : x  Auto Neutrophil % : x  Auto Lymphocyte % : x  Auto Monocyte % : x  Auto Eosinophil % : x  Auto Basophil % : x    11-18    139  |  107  |  13  ----------------------------<  83  4.0   |  22  |  0.84    Ca    8.5      18 Nov 2023 07:19          Urinalysis Basic - ( 18 Nov 2023 07:19 )    Color: x / Appearance: x / SG: x / pH: x  Gluc: 83 mg/dL / Ketone: x  / Bili: x / Urobili: x   Blood: x / Protein: x / Nitrite: x   Leuk Esterase: x / RBC: x / WBC x   Sq Epi: x / Non Sq Epi: x / Bacteria: x      CAPILLARY BLOOD GLUCOSE          RADIOLOGY & ADDITIONAL TESTS:      
 87 y/o female w/ PMH dementia, depression, anxiety, HTN, HLD w/ PSH knee/hip replacement c/o fall this AM. Pt was walking out of the restroom, felt her leg give out, and sat down. Pt twisted her right leg standing up, causing pain. Pt was diagnosed w/ a right femur fracture by PCP XRs, prompting ED arrival. Pt is otherwise asymptomatic.  Pt admits to lightheadedness and nausea, otherwise asymptomatic. Denies fevers, chills, vomiting, chest pain, SOB, abdominal pain, dysuria, hematuria. Pt seen now resting comfortably. Patient seen post op after a right periprosthetic femur fracture. Pt tolerated the procedure. Patient found to be in New onset afib. Patient started on lopressor and converted to NSR. Patient with complaint of constipation. Patient with complaint of left breast pain      MEDICATIONS  (STANDING):  acetaminophen     Tablet .. 975 milliGRAM(s) Oral every 8 hours  ascorbic acid 500 milliGRAM(s) Oral daily  atorvastatin 80 milliGRAM(s) Oral at bedtime  calcium carbonate 1250 mG  + Vitamin D (OsCal 500 + D) 1 Tablet(s) Oral two times a day  donepezil 10 milliGRAM(s) Oral <User Schedule>  FLUoxetine 40 milliGRAM(s) Oral daily  lactated ringers. 1000 milliLiter(s) (100 mL/Hr) IV Continuous <Continuous>  lactulose Retention Enema 200 Gram(s) Rectal once  losartan 50 milliGRAM(s) Oral daily  melatonin 3 milliGRAM(s) Oral at bedtime  memantine 5 milliGRAM(s) Oral every 12 hours  multivitamin 1 Tablet(s) Oral daily  pantoprazole    Tablet 40 milliGRAM(s) Oral before breakfast  polyethylene glycol 3350 17 Gram(s) Oral daily  QUEtiapine  milliGRAM(s) Oral <User Schedule>  rivaroxaban 10 milliGRAM(s) Oral daily  senna 1 Tablet(s) Oral at bedtime    MEDICATIONS  (PRN):  bisacodyl 5 milliGRAM(s) Oral every 12 hours PRN Constipation  bisacodyl Suppository 10 milliGRAM(s) Rectal daily PRN Constipation  clonazePAM  Tablet 0.5 milliGRAM(s) Oral two times a day PRN anxiety  ondansetron   Disintegrating Tablet 4 milliGRAM(s) Oral every 12 hours PRN Nausea and/or Vomiting  ondansetron Injectable 4 milliGRAM(s) IV Push every 6 hours PRN Nausea and/or Vomiting  QUEtiapine 25 milliGRAM(s) Oral three times a day PRN home med  traMADol 50 milliGRAM(s) Oral every 8 hours PRN Severe Pain (7 - 10)  traMADol 25 milliGRAM(s) Oral every 8 hours PRN Moderate Pain (4 - 6)          VITALS:   T(C): 36.8 (11-20-23 @ 13:06), Max: 37.2 (11-19-23 @ 22:13)  HR: 70 (11-20-23 @ 13:06) (66 - 88)  BP: 117/83 (11-20-23 @ 13:06) (112/69 - 132/84)  RR: 18 (11-20-23 @ 13:06) (18 - 18)  SpO2: 95% (11-20-23 @ 13:06) (95% - 98%)  Wt(kg): --    PHYSICAL EXAM:  GENERAL: NAD, well-groomed, well-developed  HEAD:  Atraumatic, Normocephalic  EYES: EOMI, PERRLA, conjunctiva and sclera clear  ENMT: No tonsillar erythema, exudates, or enlargement; Moist mucous membranes, Good dentition, No lesions  NECK: Supple, No JVD, Normal thyroid  NERVOUS SYSTEM:  Alert & Oriented X1,  CHEST/LUNG: Clear to percussion bilaterally; No rales, rhonchi, wheezing, or rubs  HEART: Regular rate and rhythm; No murmurs, rubs, or gallops  ABDOMEN: Soft, Nontender, Nondistended; Bowel sounds present  EXTREMITIES:  2+ Peripheral Pulses, No clubbing, cyanosis, or edema  LYMPH: No lymphadenopathy noted  SKIN: tenderness and swelling of left breast warm to the touch     LABS:        CBC Full  -  ( 20 Nov 2023 07:59 )  WBC Count : 11.43 K/uL  RBC Count : 3.36 M/uL  Hemoglobin : 10.7 g/dL  Hematocrit : 32.3 %  Platelet Count - Automated : 231 K/uL  Mean Cell Volume : 96.1 fl  Mean Cell Hemoglobin : 31.8 pg  Mean Cell Hemoglobin Concentration : 33.1 gm/dL  Auto Neutrophil # : x  Auto Lymphocyte # : x  Auto Monocyte # : x  Auto Eosinophil # : x  Auto Basophil # : x  Auto Neutrophil % : x  Auto Lymphocyte % : x  Auto Monocyte % : x  Auto Eosinophil % : x  Auto Basophil % : x    11-20    140  |  105  |  11  ----------------------------<  86  3.6   |  25  |  0.78    Ca    8.9      20 Nov 2023 07:59          Urinalysis Basic - ( 20 Nov 2023 07:59 )    Color: x / Appearance: x / SG: x / pH: x  Gluc: 86 mg/dL / Ketone: x  / Bili: x / Urobili: x   Blood: x / Protein: x / Nitrite: x   Leuk Esterase: x / RBC: x / WBC x   Sq Epi: x / Non Sq Epi: x / Bacteria: x      CAPILLARY BLOOD GLUCOSE          RADIOLOGY & ADDITIONAL TESTS:      
  ORTHOPEDIC PROGRESS NOTE    Overnight events: None    SUBJECTIVE: Pt seen and examined at bedside. Patient is doing well, no acute complaints this AM. Pain is controlled with medication      OBJECTIVE:  Vital Signs Last 24 Hrs  T(C): 36.8 (19 Nov 2023 01:06), Max: 37.3 (18 Nov 2023 17:30)  T(F): 98.2 (19 Nov 2023 01:06), Max: 99.2 (18 Nov 2023 17:30)  HR: 63 (19 Nov 2023 01:06) (55 - 64)  BP: 137/66 (19 Nov 2023 01:06) (124/73 - 137/71)  BP(mean): --  RR: 18 (19 Nov 2023 01:06) (18 - 18)  SpO2: 100% (19 Nov 2023 01:06) (95% - 100%)    Parameters below as of 19 Nov 2023 01:06  Patient On (Oxygen Delivery Method): room air          11-17-23 @ 07:01  -  11-18-23 @ 07:00  --------------------------------------------------------  IN: 690 mL / OUT: 200 mL / NET: 490 mL    11-18-23 @ 07:01  -  11-19-23 @ 04:39  --------------------------------------------------------  IN: 640 mL / OUT: 800 mL / NET: -160 mL        Physical Examination:  GEN: NAD, resting quietly  PULM: symmetric chest rise bilaterally, no increased WOB  ABD: nondistended  EXTR:   RLE, dressing c/d/i       SILT DP/SP/ Abad/Saph/tib       +EHL/FHL/TA/Gastroc,        DP+,        soft compartments, no calf ttp      LABS:                        9.7    5.80  )-----------( 123      ( 18 Nov 2023 07:19 )             31.9       11-18    139  |  107  |  13  ----------------------------<  83  4.0   |  22  |  0.84    Ca    8.5      18 Nov 2023 07:19        
 89 y/o female w/ PMH dementia, depression, anxiety, HTN, HLD w/ PSH knee/hip replacement c/o fall this AM. Pt was walking out of the restroom, felt her leg give out, and sat down. Pt twisted her right leg standing up, causing pain. Pt was diagnosed w/ a right femur fracture by PCP XRs, prompting ED arrival. Pt is otherwise asymptomatic.  Pt admits to lightheadedness and nausea, otherwise asymptomatic. Denies fevers, chills, vomiting, chest pain, SOB, abdominal pain, dysuria, hematuria. Pt seen now resting comfortably. Patient seen post op after a right periprosthetic femur fracture. Pt tolerated the procedure. Patient found to be in New onset afib. Patient started on lopressor and converted to NSR. Patient with complaint of constipation. Patient with complaint of left breast pain. Patient scheduled for [place] home      MEDICATIONS  (STANDING):  acetaminophen     Tablet .. 975 milliGRAM(s) Oral every 8 hours  ascorbic acid 500 milliGRAM(s) Oral daily  atorvastatin 80 milliGRAM(s) Oral at bedtime  calcium carbonate 1250 mG  + Vitamin D (OsCal 500 + D) 1 Tablet(s) Oral two times a day  donepezil 10 milliGRAM(s) Oral <User Schedule>  FLUoxetine 40 milliGRAM(s) Oral daily  lactated ringers. 1000 milliLiter(s) (100 mL/Hr) IV Continuous <Continuous>  lactulose Retention Enema 200 Gram(s) Rectal once  losartan 50 milliGRAM(s) Oral daily  melatonin 3 milliGRAM(s) Oral at bedtime  memantine 5 milliGRAM(s) Oral every 12 hours  multivitamin 1 Tablet(s) Oral daily  pantoprazole    Tablet 40 milliGRAM(s) Oral before breakfast  polyethylene glycol 3350 17 Gram(s) Oral daily  QUEtiapine  milliGRAM(s) Oral <User Schedule>  rivaroxaban 10 milliGRAM(s) Oral daily  senna 1 Tablet(s) Oral at bedtime    MEDICATIONS  (PRN):  bisacodyl 5 milliGRAM(s) Oral every 12 hours PRN Constipation  bisacodyl Suppository 10 milliGRAM(s) Rectal daily PRN Constipation  clonazePAM  Tablet 0.5 milliGRAM(s) Oral two times a day PRN anxiety  ondansetron   Disintegrating Tablet 4 milliGRAM(s) Oral every 12 hours PRN Nausea and/or Vomiting  ondansetron Injectable 4 milliGRAM(s) IV Push every 6 hours PRN Nausea and/or Vomiting  QUEtiapine 25 milliGRAM(s) Oral three times a day PRN home med  traMADol 50 milliGRAM(s) Oral every 8 hours PRN Severe Pain (7 - 10)  traMADol 25 milliGRAM(s) Oral every 8 hours PRN Moderate Pain (4 - 6)          VITALS:   T(C): 36.8 (11-20-23 @ 13:06), Max: 37.2 (11-19-23 @ 22:13)  HR: 70 (11-20-23 @ 13:06) (66 - 88)  BP: 117/83 (11-20-23 @ 13:06) (112/69 - 132/84)  RR: 18 (11-20-23 @ 13:06) (18 - 18)  SpO2: 95% (11-20-23 @ 13:06) (95% - 98%)  Wt(kg): --    PHYSICAL EXAM:  GENERAL: NAD, well-groomed, well-developed  HEAD:  Atraumatic, Normocephalic  EYES: EOMI, PERRLA, conjunctiva and sclera clear  ENMT: No tonsillar erythema, exudates, or enlargement; Moist mucous membranes, Good dentition, No lesions  NECK: Supple, No JVD, Normal thyroid  NERVOUS SYSTEM:  Alert & Oriented X1,  CHEST/LUNG: Clear to percussion bilaterally; No rales, rhonchi, wheezing, or rubs  HEART: Regular rate and rhythm; No murmurs, rubs, or gallops  ABDOMEN: Soft, Nontender, Nondistended; Bowel sounds present  EXTREMITIES:  2+ Peripheral Pulses, No clubbing, cyanosis, or edema  LYMPH: No lymphadenopathy noted  SKIN: tenderness and swelling of left breast warm to the touch     LABS:        CBC Full  -  ( 20 Nov 2023 07:59 )  WBC Count : 11.43 K/uL  RBC Count : 3.36 M/uL  Hemoglobin : 10.7 g/dL  Hematocrit : 32.3 %  Platelet Count - Automated : 231 K/uL  Mean Cell Volume : 96.1 fl  Mean Cell Hemoglobin : 31.8 pg  Mean Cell Hemoglobin Concentration : 33.1 gm/dL  Auto Neutrophil # : x  Auto Lymphocyte # : x  Auto Monocyte # : x  Auto Eosinophil # : x  Auto Basophil # : x  Auto Neutrophil % : x  Auto Lymphocyte % : x  Auto Monocyte % : x  Auto Eosinophil % : x  Auto Basophil % : x    11-20    140  |  105  |  11  ----------------------------<  86  3.6   |  25  |  0.78    Ca    8.9      20 Nov 2023 07:59          Urinalysis Basic - ( 20 Nov 2023 07:59 )    Color: x / Appearance: x / SG: x / pH: x  Gluc: 86 mg/dL / Ketone: x  / Bili: x / Urobili: x   Blood: x / Protein: x / Nitrite: x   Leuk Esterase: x / RBC: x / WBC x   Sq Epi: x / Non Sq Epi: x / Bacteria: x      CAPILLARY BLOOD GLUCOSE          RADIOLOGY & ADDITIONAL TESTS:      
 89 y/o female w/ PMH dementia, depression, anxiety, HTN, HLD w/ PSH knee/hip replacement c/o fall this AM. Pt was walking out of the restroom, felt her leg give out, and sat down. Pt twisted her right leg standing up, causing pain. Pt was diagnosed w/ a right femur fracture by PCP XRs, prompting ED arrival. Pt is otherwise asymptomatic.  Pt admits to lightheadedness and nausea, otherwise asymptomatic. Denies fevers, chills, vomiting, chest pain, SOB, abdominal pain, dysuria, hematuria. Pt seen now resting comfortably. Patient seen post op after a right periprosthetic femur fracture. Pt tolerated the procedure. Patient found to be in New onset afib. Patient started on lopressor and converted to NSR. Patient states pain has been managed.       MEDICATIONS  (STANDING):  acetaminophen     Tablet .. 975 milliGRAM(s) Oral every 8 hours  ascorbic acid 500 milliGRAM(s) Oral daily  atorvastatin 80 milliGRAM(s) Oral at bedtime  calcium carbonate 1250 mG  + Vitamin D (OsCal 500 + D) 1 Tablet(s) Oral two times a day  donepezil 10 milliGRAM(s) Oral <User Schedule>  FLUoxetine 40 milliGRAM(s) Oral daily  lactated ringers. 1000 milliLiter(s) (100 mL/Hr) IV Continuous <Continuous>  losartan 50 milliGRAM(s) Oral daily  melatonin 3 milliGRAM(s) Oral at bedtime  memantine 5 milliGRAM(s) Oral every 12 hours  multivitamin 1 Tablet(s) Oral daily  pantoprazole    Tablet 40 milliGRAM(s) Oral before breakfast  polyethylene glycol 3350 17 Gram(s) Oral daily  QUEtiapine  milliGRAM(s) Oral <User Schedule>  rivaroxaban 10 milliGRAM(s) Oral daily  senna 1 Tablet(s) Oral at bedtime    MEDICATIONS  (PRN):  clonazePAM  Tablet 0.5 milliGRAM(s) Oral two times a day PRN anxiety  ondansetron   Disintegrating Tablet 4 milliGRAM(s) Oral every 12 hours PRN Nausea and/or Vomiting  ondansetron Injectable 4 milliGRAM(s) IV Push every 6 hours PRN Nausea and/or Vomiting  QUEtiapine 25 milliGRAM(s) Oral three times a day PRN home med  traMADol 50 milliGRAM(s) Oral every 8 hours PRN Severe Pain (7 - 10)  traMADol 25 milliGRAM(s) Oral every 8 hours PRN Moderate Pain (4 - 6)          VITALS:   T(C): 37.2 (11-16-23 @ 09:55), Max: 37.2 (11-16-23 @ 09:55)  HR: 65 (11-16-23 @ 10:15) (51 - 112)  BP: 109/67 (11-16-23 @ 10:15) (91/55 - 131/84)  RR: 18 (11-16-23 @ 09:55) (16 - 19)  SpO2: 97% (11-16-23 @ 10:15) (94% - 100%)  Wt(kg): --    PHYSICAL EXAM:  GENERAL: NAD, well-groomed, well-developed  HEAD:  Atraumatic, Normocephalic  EYES: EOMI, PERRLA, conjunctiva and sclera clear  ENMT: No tonsillar erythema, exudates, or enlargement; Moist mucous membranes, Good dentition, No lesions  NECK: Supple, No JVD, Normal thyroid  NERVOUS SYSTEM:  Alert & Oriented X1,  CHEST/LUNG: Clear to percussion bilaterally; No rales, rhonchi, wheezing, or rubs  HEART: Regular rate and rhythm; No murmurs, rubs, or gallops  ABDOMEN: Soft, Nontender, Nondistended; Bowel sounds present  EXTREMITIES:  2+ Peripheral Pulses, No clubbing, cyanosis, or edema  LYMPH: No lymphadenopathy noted  SKIN: No rashes or lesions    LABS:        CBC Full  -  ( 16 Nov 2023 07:26 )  WBC Count : 10.19 K/uL  RBC Count : 2.54 M/uL  Hemoglobin : 8.5 g/dL  Hematocrit : 27.1 %  Platelet Count - Automated : 147 K/uL  Mean Cell Volume : 106.7 fl  Mean Cell Hemoglobin : 33.5 pg  Mean Cell Hemoglobin Concentration : 31.4 gm/dL  Auto Neutrophil # : x  Auto Lymphocyte # : x  Auto Monocyte # : x  Auto Eosinophil # : x  Auto Basophil # : x  Auto Neutrophil % : x  Auto Lymphocyte % : x  Auto Monocyte % : x  Auto Eosinophil % : x  Auto Basophil % : x    11-16    135  |  101  |  23  ----------------------------<  87  4.3   |  23  |  1.09    Ca    8.3<L>      16 Nov 2023 07:26    TPro  6.7  /  Alb  3.6  /  TBili  0.7  /  DBili  x   /  AST  25  /  ALT  13  /  AlkPhos  68  11-14    LIVER FUNCTIONS - ( 14 Nov 2023 14:42 )  Alb: 3.6 g/dL / Pro: 6.7 g/dL / ALK PHOS: 68 U/L / ALT: 13 U/L / AST: 25 U/L / GGT: x           PT/INR - ( 15 Nov 2023 01:57 )   PT: 12.2 sec;   INR: 1.17 ratio         PTT - ( 15 Nov 2023 01:57 )  PTT:25.0 sec  Urinalysis Basic - ( 16 Nov 2023 07:26 )    Color: x / Appearance: x / SG: x / pH: x  Gluc: 87 mg/dL / Ketone: x  / Bili: x / Urobili: x   Blood: x / Protein: x / Nitrite: x   Leuk Esterase: x / RBC: x / WBC x   Sq Epi: x / Non Sq Epi: x / Bacteria: x      CAPILLARY BLOOD GLUCOSE          RADIOLOGY & ADDITIONAL TESTS:      
 89 y/o female w/ PMH dementia, depression, anxiety, HTN, HLD w/ PSH knee/hip replacement c/o fall this AM. Pt was walking out of the restroom, felt her leg give out, and sat down. Pt twisted her right leg standing up, causing pain. Pt was diagnosed w/ a right femur fracture by PCP XRs, prompting ED arrival. Pt is otherwise asymptomatic.  Pt admits to lightheadedness and nausea, otherwise asymptomatic. Denies fevers, chills, vomiting, chest pain, SOB, abdominal pain, dysuria, hematuria. Pt seen now resting comfortably. Patient seen post op after a right periprosthetic femur fracture. Pt tolerated the procedure. Patient found to be in New onset afib. Patient started on lopressor and converted to NSR. Patient with complaint of constipation.         MEDICATIONS  (STANDING):  acetaminophen     Tablet .. 975 milliGRAM(s) Oral every 8 hours  ascorbic acid 500 milliGRAM(s) Oral daily  atorvastatin 80 milliGRAM(s) Oral at bedtime  calcium carbonate 1250 mG  + Vitamin D (OsCal 500 + D) 1 Tablet(s) Oral two times a day  donepezil 10 milliGRAM(s) Oral <User Schedule>  famotidine    Tablet 40 milliGRAM(s) Oral two times a day  FLUoxetine 40 milliGRAM(s) Oral daily  lactated ringers. 1000 milliLiter(s) (100 mL/Hr) IV Continuous <Continuous>  lactulose Retention Enema 200 Gram(s) Rectal once  losartan 50 milliGRAM(s) Oral daily  melatonin 3 milliGRAM(s) Oral at bedtime  memantine 5 milliGRAM(s) Oral every 12 hours  multivitamin 1 Tablet(s) Oral daily  pantoprazole    Tablet 40 milliGRAM(s) Oral before breakfast  polyethylene glycol 3350 17 Gram(s) Oral daily  QUEtiapine  milliGRAM(s) Oral <User Schedule>  rivaroxaban 10 milliGRAM(s) Oral daily  senna 1 Tablet(s) Oral at bedtime    MEDICATIONS  (PRN):  bisacodyl 5 milliGRAM(s) Oral every 12 hours PRN Constipation  bisacodyl Suppository 10 milliGRAM(s) Rectal daily PRN Constipation  clonazePAM  Tablet 0.5 milliGRAM(s) Oral two times a day PRN anxiety  ondansetron   Disintegrating Tablet 4 milliGRAM(s) Oral every 12 hours PRN Nausea and/or Vomiting  ondansetron Injectable 4 milliGRAM(s) IV Push every 6 hours PRN Nausea and/or Vomiting  QUEtiapine 25 milliGRAM(s) Oral three times a day PRN home med  traMADol 50 milliGRAM(s) Oral every 8 hours PRN Severe Pain (7 - 10)  traMADol 25 milliGRAM(s) Oral every 8 hours PRN Moderate Pain (4 - 6)          VITALS:   T(C): 37 (11-19-23 @ 13:01), Max: 37.3 (11-18-23 @ 17:30)  HR: 108 (11-19-23 @ 13:01) (55 - 108)  BP: 107/66 (11-19-23 @ 13:01) (104/71 - 145/85)  RR: 18 (11-19-23 @ 13:01) (18 - 18)  SpO2: 97% (11-19-23 @ 13:01) (97% - 100%)  Wt(kg): --      PHYSICAL EXAM:  GENERAL: NAD, well-groomed, well-developed  HEAD:  Atraumatic, Normocephalic  EYES: EOMI, PERRLA, conjunctiva and sclera clear  ENMT: No tonsillar erythema, exudates, or enlargement; Moist mucous membranes, Good dentition, No lesions  NECK: Supple, No JVD, Normal thyroid  NERVOUS SYSTEM:  Alert & Oriented X1,  CHEST/LUNG: Clear to percussion bilaterally; No rales, rhonchi, wheezing, or rubs  HEART: Regular rate and rhythm; No murmurs, rubs, or gallops  ABDOMEN: Soft, Nontender, Nondistended; Bowel sounds present  EXTREMITIES:  2+ Peripheral Pulses, No clubbing, cyanosis, or edema  LYMPH: No lymphadenopathy noted  SKIN: No rashes or lesions    LABS:        CBC Full  -  ( 19 Nov 2023 07:11 )  WBC Count : 10.77 K/uL  RBC Count : 3.38 M/uL  Hemoglobin : 10.6 g/dL  Hematocrit : 32.7 %  Platelet Count - Automated : 189 K/uL  Mean Cell Volume : 96.7 fl  Mean Cell Hemoglobin : 31.4 pg  Mean Cell Hemoglobin Concentration : 32.4 gm/dL  Auto Neutrophil # : x  Auto Lymphocyte # : x  Auto Monocyte # : x  Auto Eosinophil # : x  Auto Basophil # : x  Auto Neutrophil % : x  Auto Lymphocyte % : x  Auto Monocyte % : x  Auto Eosinophil % : x  Auto Basophil % : x    11-19    139  |  105  |  12  ----------------------------<  101<H>  3.8   |  24  |  0.77    Ca    8.5      19 Nov 2023 07:17          Urinalysis Basic - ( 19 Nov 2023 07:17 )    Color: x / Appearance: x / SG: x / pH: x  Gluc: 101 mg/dL / Ketone: x  / Bili: x / Urobili: x   Blood: x / Protein: x / Nitrite: x   Leuk Esterase: x / RBC: x / WBC x   Sq Epi: x / Non Sq Epi: x / Bacteria: x      CAPILLARY BLOOD GLUCOSE          RADIOLOGY & ADDITIONAL TESTS:

## 2023-11-21 NOTE — PROGRESS NOTE ADULT - PROBLEM SELECTOR PLAN 7
continue to monitor H&H and transfuse as needed

## 2023-11-21 NOTE — DISCHARGE NOTE NURSING/CASE MANAGEMENT/SOCIAL WORK - NSSCTYPOFSERV_GEN_ALL_CORE
Home RN, P/T and OT services. RN will contact you to schedule a visit time. Anticipated start of care 11/22.

## 2023-11-21 NOTE — DISCHARGE NOTE NURSING/CASE MANAGEMENT/SOCIAL WORK - PATIENT PORTAL LINK FT
You can access the FollowMyHealth Patient Portal offered by [facility] by registering at the following website: [email or web address]. By joining [email or web address]’s FollowMyHealth portal, you will also be able to view your health information using other applications (apps) compatible with our system.

## 2023-11-22 ENCOUNTER — NON-APPOINTMENT (OUTPATIENT)
Age: 88
End: 2023-11-22

## 2023-11-28 ENCOUNTER — APPOINTMENT (OUTPATIENT)
Dept: GERIATRICS | Facility: CLINIC | Age: 88
End: 2023-11-28
Payer: MEDICARE

## 2023-11-28 DIAGNOSIS — N64.4 MASTODYNIA: ICD-10-CM

## 2023-11-28 DIAGNOSIS — S72.401S: ICD-10-CM

## 2023-11-28 PROCEDURE — 99496 TRANSJ CARE MGMT HIGH F2F 7D: CPT | Mod: 95

## 2023-11-28 RX ORDER — RIVAROXABAN 20 MG/1
20 TABLET, FILM COATED ORAL
Qty: 90 | Refills: 3 | Status: ACTIVE | COMMUNITY
Start: 2023-11-28 | End: 1900-01-01

## 2023-11-29 PROBLEM — N64.4 BREAST PAIN, LEFT: Status: ACTIVE | Noted: 2023-11-29

## 2023-11-29 PROBLEM — S72.401S: Status: ACTIVE | Noted: 2023-11-29

## 2023-11-29 RX ORDER — OXYCODONE 5 MG/1
5 TABLET ORAL EVERY 8 HOURS
Qty: 45 | Refills: 0 | Status: ACTIVE | COMMUNITY
Start: 2023-11-29 | End: 1900-01-01

## 2024-02-06 NOTE — ED PROVIDER NOTE - NSTIMEPROVIDERCAREINITIATE_GEN_ER
14-Nov-2023 11:08 Erivedge Pregnancy And Lactation Text: This medication is Pregnancy Category X and is absolutely contraindicated during pregnancy. It is unknown if it is excreted in breast milk. Hydroquinone Pregnancy And Lactation Text: This medication has not been assigned a Pregnancy Risk Category but animal studies failed to show danger with the topical medication. It is unknown if the medication is excreted in breast milk. Sarecycline Pregnancy And Lactation Text: This medication is Pregnancy Category D and not consider safe during pregnancy. It is also excreted in breast milk. Stelara Pregnancy And Lactation Text: This medication is Pregnancy Category B and is considered safe during pregnancy. It is unknown if this medication is excreted in breast milk. Adbry Pregnancy And Lactation Text: It is unknown if this medication will adversely affect pregnancy or breast feeding. Acitretin Counseling:  I discussed with the patient the risks of acitretin including but not limited to hair loss, dry lips/skin/eyes, liver damage, hyperlipidemia, depression/suicidal ideation, photosensitivity.  Serious rare side effects can include but are not limited to pancreatitis, pseudotumor cerebri, bony changes, clot formation/stroke/heart attack.  Patient understands that alcohol is contraindicated since it can result in liver toxicity and significantly prolong the elimination of the drug by many years. Otezla Counseling: The side effects of Otezla were discussed with the patient, including but not limited to worsening or new depression, weight loss, diarrhea, nausea, upper respiratory tract infection, and headache. Patient instructed to call the office should any adverse effect occur.  The patient verbalized understanding of the proper use and possible adverse effects of Otezla.  All the patient's questions and concerns were addressed. Erythromycin Counseling:  I discussed with the patient the risks of erythromycin including but not limited to GI upset, allergic reaction, drug rash, diarrhea, increase in liver enzymes, and yeast infections. Prednisone Counseling:  I discussed with the patient the risks of prolonged use of prednisone including but not limited to weight gain, insomnia, osteoporosis, mood changes, diabetes, susceptibility to infection, glaucoma and high blood pressure.  In cases where prednisone use is prolonged, patients should be monitored with blood pressure checks, serum glucose levels and an eye exam.  Additionally, the patient may need to be placed on GI prophylaxis, PCP prophylaxis, and calcium and vitamin D supplementation and/or a bisphosphonate.  The patient verbalized understanding of the proper use and the possible adverse effects of prednisone.  All of the patient's questions and concerns were addressed. Rituxan Counseling:  I discussed with the patient the risks of Rituxan infusions. Side effects can include infusion reactions, severe drug rashes including mucocutaneous reactions, reactivation of latent hepatitis and other infections and rarely progressive multifocal leukoencephalopathy.  All of the patient's questions and concerns were addressed. Calcipotriene Counseling:  I discussed with the patient the risks of calcipotriene including but not limited to erythema, scaling, itching, and irritation. Azathioprine Pregnancy And Lactation Text: This medication is Pregnancy Category D and isn't considered safe during pregnancy. It is unknown if this medication is excreted in breast milk. Rinvoq Counseling: I discussed with the patient the risks of Rinvoq therapy including but not limited to upper respiratory tract infections, shingles, cold sores, bronchitis, nausea, cough, fever, acne, and headache. Live vaccines should be avoided.  This medication has been linked to serious infections; higher rate of mortality; malignancy and lymphoproliferative disorders; major adverse cardiovascular events; thrombosis; thrombocytopenia, anemia, and neutropenia; lipid elevations; liver enzyme elevations; and gastrointestinal perforations. Itraconazole Pregnancy And Lactation Text: This medication is Pregnancy Category C and it isn't know if it is safe during pregnancy. It is also excreted in breast milk. Doxepin Pregnancy And Lactation Text: This medication is Pregnancy Category C and it isn't known if it is safe during pregnancy. It is also excreted in breast milk and breast feeding isn't recommended. Enbrel Counseling:  I discussed with the patient the risks of etanercept including but not limited to myelosuppression, immunosuppression, autoimmune hepatitis, demyelinating diseases, lymphoma, and infections.  The patient understands that monitoring is required including a PPD at baseline and must alert us or the primary physician if symptoms of infection or other concerning signs are noted. Zoryve Counseling:  I discussed with the patient that Zoryve is not for use in the eyes, mouth or vagina. The most commonly reported side effects include diarrhea, headache, insomnia, application site pain, upper respiratory tract infections, and urinary tract infections.  All of the patient's questions and concerns were addressed. Tazorac Pregnancy And Lactation Text: This medication is not safe during pregnancy. It is unknown if this medication is excreted in breast milk. Rhofade Pregnancy And Lactation Text: This medication has not been assigned a Pregnancy Risk Category. It is unknown if the medication is excreted in breast milk. Hydroxyzine Counseling: Patient advised that the medication is sedating and not to drive a car after taking this medication.  Patient informed of potential adverse effects including but not limited to dry mouth, urinary retention, and blurry vision.  The patient verbalized understanding of the proper use and possible adverse effects of hydroxyzine.  All of the patient's questions and concerns were addressed. Bactrim Counseling:  I discussed with the patient the risks of sulfa antibiotics including but not limited to GI upset, allergic reaction, drug rash, diarrhea, dizziness, photosensitivity, and yeast infections.  Rarely, more serious reactions can occur including but not limited to aplastic anemia, agranulocytosis, methemoglobinemia, blood dyscrasias, liver or kidney failure, lung infiltrates or desquamative/blistering drug rashes. Otezla Pregnancy And Lactation Text: This medication is Pregnancy Category C and it isn't known if it is safe during pregnancy. It is unknown if it is excreted in breast milk. Finasteride Male Counseling: Finasteride Counseling:  I discussed with the patient the risks of use of finasteride including but not limited to decreased libido, decreased ejaculate volume, gynecomastia, and depression. Women should not handle medication.  All of the patient's questions and concerns were addressed. Niacinamide Counseling: I recommended taking niacin or niacinamide, also know as vitamin B3, twice daily. Recent evidence suggests that taking vitamin B3 (500 mg twice daily) can reduce the risk of actinic keratoses and non-melanoma skin cancers. Side effects of vitamin B3 include flushing and headache. Opzelura Counseling:  I discussed with the patient the risks of Opzelura including but not limited to nasopharngitis, bronchitis, ear infection, eosinophila, hives, diarrhea, folliculitis, tonsillitis, and rhinorrhea.  Taken orally, this medication has been linked to serious infections; higher rate of mortality; malignancy and lymphoproliferative disorders; major adverse cardiovascular events; thrombosis; thrombocytopenia, anemia, and neutropenia; and lipid elevations. Calcipotriene Pregnancy And Lactation Text: The use of this medication during pregnancy or lactation is not recommended as there is insufficient data. Aklief counseling:  Patient advised to apply a pea-sized amount only at bedtime and wait 30 minutes after washing their face before applying.  If too drying, patient may add a non-comedogenic moisturizer.  The most commonly reported side effects including irritation, redness, scaling, dryness, stinging, burning, itching, and increased risk of sunburn.  The patient verbalized understanding of the proper use and possible adverse effects of retinoids.  All of the patient's questions and concerns were addressed. Taltz Counseling: I discussed with the patient the risks of ixekizumab including but not limited to immunosuppression, serious infections, worsening of inflammatory bowel disease and drug reactions.  The patient understands that monitoring is required including a PPD at baseline and must alert us or the primary physician if symptoms of infection or other concerning signs are noted. Imiquimod Counseling:  I discussed with the patient the risks of imiquimod including but not limited to erythema, scaling, itching, weeping, crusting, and pain.  Patient understands that the inflammatory response to imiquimod is variable from person to person and was educated regarded proper titration schedule.  If flu-like symptoms develop, patient knows to discontinue the medication and contact us. Prednisone Pregnancy And Lactation Text: This medication is Pregnancy Category C and it isn't know if it is safe during pregnancy. This medication is excreted in breast milk. Finasteride Female Counseling: Finasteride Counseling:  I discussed with the patient the risks of use of finasteride including but not limited to decreased libido and sexual dysfunction. Explained the teratogenic nature of the medication and stressed the importance of not getting pregnant during treatment. All of the patient's questions and concerns were addressed. Topical Sulfur Applications Counseling: Topical Sulfur Counseling: Patient counseled that this medication may cause skin irritation or allergic reactions.  In the event of skin irritation, the patient was advised to reduce the amount of the drug applied or use it less frequently.   The patient verbalized understanding of the proper use and possible adverse effects of topical sulfur application.  All of the patient's questions and concerns were addressed. Tranexamic Acid Counseling:  Patient advised of the small risk of bleeding problems with tranexamic acid. They were also instructed to call if they developed any nausea, vomiting or diarrhea. All of the patient's questions and concerns were addressed. Acitretin Pregnancy And Lactation Text: This medication is Pregnancy Category X and should not be given to women who are pregnant or may become pregnant in the future. This medication is excreted in breast milk. Clofazimine Counseling:  I discussed with the patient the risks of clofazimine including but not limited to skin and eye pigmentation, liver damage, nausea/vomiting, gastrointestinal bleeding and allergy. Rituxan Pregnancy And Lactation Text: This medication is Pregnancy Category C and it isn't know if it is safe during pregnancy. It is unknown if this medication is excreted in breast milk but similar antibodies are known to be excreted. Cellcept Counseling:  I discussed with the patient the risks of mycophenolate mofetil including but not limited to infection/immunosuppression, GI upset, hypokalemia, hypercholesterolemia, bone marrow suppression, lymphoproliferative disorders, malignancy, GI ulceration/bleed/perforation, colitis, interstitial lung disease, kidney failure, progressive multifocal leukoencephalopathy, and birth defects.  The patient understands that monitoring is required including a baseline creatinine and regular CBC testing. In addition, patient must alert us immediately if symptoms of infection or other concerning signs are noted. Cantharidin Counseling:  I discussed with the patient the risks of Cantharidin including but not limited to pain, redness, burning, itching, and blistering. Tetracycline Counseling: Patient counseled regarding possible photosensitivity and increased risk for sunburn.  Patient instructed to avoid sunlight, if possible.  When exposed to sunlight, patients should wear protective clothing, sunglasses, and sunscreen.  The patient was instructed to call the office immediately if the following severe adverse effects occur:  hearing changes, easy bruising/bleeding, severe headache, or vision changes.  The patient verbalized understanding of the proper use and possible adverse effects of tetracycline.  All of the patient's questions and concerns were addressed. Patient understands to avoid pregnancy while on therapy due to potential birth defects. Bimzelx Counseling:  I discussed with the patient the risks of Bimzelx including but not limited to depression, immunosuppression, allergic reactions and infections.  The patient understands that monitoring is required including a PPD at baseline and must alert us or the primary physician if symptoms of infection or other concerning signs are noted. Rinvoq Pregnancy And Lactation Text: Based on animal studies, Rinvoq may cause embryo-fetal harm when administered to pregnant women.  The medication should not be used in pregnancy.  Breastfeeding is not recommended during treatment and for 6 days after the last dose. Erythromycin Pregnancy And Lactation Text: This medication is Pregnancy Category B and is considered safe during pregnancy. It is also excreted in breast milk. Ketoconazole Counseling:   Patient counseled regarding improving absorption with orange juice.  Adverse effects include but are not limited to breast enlargement, headache, diarrhea, nausea, upset stomach, liver function test abnormalities, taste disturbance, and stomach pain.  There is a rare possibility of liver failure that can occur when taking ketoconazole. The patient understands that monitoring of LFTs may be required, especially at baseline. The patient verbalized understanding of the proper use and possible adverse effects of ketoconazole.  All of the patient's questions and concerns were addressed. Hydroxyzine Pregnancy And Lactation Text: This medication is not safe during pregnancy and should not be taken. It is also excreted in breast milk and breast feeding isn't recommended. Sotyktu Counseling:  I discussed the most common side effects of Sotyktu including: common cold, sore throat, sinus infections, cold sores, canker sores, folliculitis, and acne.? I also discussed more serious side effects of Sotyktu including but not limited to: serious allergic reactions; increased risk for infections such as TB; cancers such as lymphomas; rhabdomyolysis and elevated CPK; and elevated triglycerides and liver enzymes.? Solaraze Counseling:  I discussed with the patient the risks of Solaraze including but not limited to erythema, scaling, itching, weeping, crusting, and pain. Oxybutynin Counseling:  I discussed with the patient the risks of oxybutynin including but not limited to skin rash, drowsiness, dry mouth, difficulty urinating, and blurred vision. Ketoconazole Pregnancy And Lactation Text: This medication is Pregnancy Category C and it isn't know if it is safe during pregnancy. It is also excreted in breast milk and breast feeding isn't recommended. Clofazimine Pregnancy And Lactation Text: This medication is Pregnancy Category C and isn't considered safe during pregnancy. It is excreted in breast milk. Siliq Counseling:  I discussed with the patient the risks of Siliq including but not limited to new or worsening depression, suicidal thoughts and behavior, immunosuppression, malignancy, posterior leukoencephalopathy syndrome, and serious infections.  The patient understands that monitoring is required including a PPD at baseline and must alert us or the primary physician if symptoms of infection or other concerning signs are noted. There is also a special program designed to monitor depression which is required with Siliq. Bactrim Pregnancy And Lactation Text: This medication is Pregnancy Category D and is known to cause fetal risk.  It is also excreted in breast milk. Cantharidin Pregnancy And Lactation Text: This medication has not been proven safe during pregnancy. It is unknown if this medication is excreted in breast milk. Topical Clindamycin Counseling: Patient counseled that this medication may cause skin irritation or allergic reactions.  In the event of skin irritation, the patient was advised to reduce the amount of the drug applied or use it less frequently.   The patient verbalized understanding of the proper use and possible adverse effects of clindamycin.  All of the patient's questions and concerns were addressed. Opzelura Pregnancy And Lactation Text: There is insufficient data to evaluate drug-associated risk for major birth defects, miscarriage, or other adverse maternal or fetal outcomes.  There is a pregnancy registry that monitors pregnancy outcomes in pregnant persons exposed to the medication during pregnancy.  It is unknown if this medication is excreted in breast milk.  Do not breastfeed during treatment and for about 4 weeks after the last dose. Niacinamide Pregnancy And Lactation Text: These medications are considered safe during pregnancy. Aklief Pregnancy And Lactation Text: It is unknown if this medication is safe to use during pregnancy.  It is unknown if this medication is excreted in breast milk.  Breastfeeding women should use the topical cream on the smallest area of the skin for the shortest time needed while breastfeeding.  Do not apply to nipple and areola. Zoryve Pregnancy And Lactation Text: It is unknown if this medication can cause problems during pregnancy and breastfeeding. Tranexamic Acid Pregnancy And Lactation Text: It is unknown if this medication is safe during pregnancy or breast feeding. Nsaids Counseling: NSAID Counseling: I discussed with the patient that NSAIDs should be taken with food. Prolonged use of NSAIDs can result in the development of stomach ulcers.  Patient advised to stop taking NSAIDs if abdominal pain occurs.  The patient verbalized understanding of the proper use and possible adverse effects of NSAIDs.  All of the patient's questions and concerns were addressed. Taltz Pregnancy And Lactation Text: The risk during pregnancy and breastfeeding is uncertain with this medication. Picato Counseling:  I discussed with the patient the risks of Picato including but not limited to erythema, scaling, itching, weeping, crusting, and pain. Detail Level: Zone Azelaic Acid Counseling: Patient counseled that medicine may cause skin irritation and to avoid applying near the eyes.  In the event of skin irritation, the patient was advised to reduce the amount of the drug applied or use it less frequently.   The patient verbalized understanding of the proper use and possible adverse effects of azelaic acid.  All of the patient's questions and concerns were addressed. Bexarotene Counseling:  I discussed with the patient the risks of bexarotene including but not limited to hair loss, dry lips/skin/eyes, liver abnormalities, hyperlipidemia, pancreatitis, depression/suicidal ideation, photosensitivity, drug rash/allergic reactions, hypothyroidism, anemia, leukopenia, infection, cataracts, and teratogenicity.  Patient understands that they will need regular blood tests to check lipid profile, liver function tests, white blood cell count, thyroid function tests and pregnancy test if applicable. Finasteride Pregnancy And Lactation Text: This medication is absolutely contraindicated during pregnancy. It is unknown if it is excreted in breast milk. Topical Sulfur Applications Pregnancy And Lactation Text: This medication is Pregnancy Category C and has an unknown safety profile during pregnancy. It is unknown if this topical medication is excreted in breast milk. Bimzelx Pregnancy And Lactation Text: This medication crosses the placenta and the safety is uncertain during pregnancy. It is unknown if this medication is present in breast milk. 5-Fu Counseling: 5-Fluorouracil Counseling:  I discussed with the patient the risks of 5-fluorouracil including but not limited to erythema, scaling, itching, weeping, crusting, and pain. Metronidazole Counseling:  I discussed with the patient the risks of metronidazole including but not limited to seizures, nausea/vomiting, a metallic taste in the mouth, nausea/vomiting and severe allergy. Imiquimod Pregnancy And Lactation Text: This medication is Pregnancy Category C. It is unknown if this medication is excreted in breast milk. Cyclophosphamide Counseling:  I discussed with the patient the risks of cyclophosphamide including but not limited to hair loss, hormonal abnormalities, decreased fertility, abdominal pain, diarrhea, nausea and vomiting, bone marrow suppression and infection. The patient understands that monitoring is required while taking this medication. Metronidazole Pregnancy And Lactation Text: This medication is Pregnancy Category B and considered safe during pregnancy.  It is also excreted in breast milk. Sotyktu Pregnancy And Lactation Text: There is insufficient data to evaluate whether or not Sotyktu is safe to use during pregnancy.? ?It is not known if Sotyktu passes into breast milk and whether or not it is safe to use when breastfeeding.?? Oxybutynin Pregnancy And Lactation Text: This medication is Pregnancy Category B and is considered safe during pregnancy. It is unknown if it is excreted in breast milk. Solaraze Pregnancy And Lactation Text: This medication is Pregnancy Category B and is considered safe. There is some data to suggest avoiding during the third trimester. It is unknown if this medication is excreted in breast milk. Glycopyrrolate Counseling:  I discussed with the patient the risks of glycopyrrolate including but not limited to skin rash, drowsiness, dry mouth, difficulty urinating, and blurred vision. Colchicine Counseling:  Patient counseled regarding adverse effects including but not limited to stomach upset (nausea, vomiting, stomach pain, or diarrhea).  Patient instructed to limit alcohol consumption while taking this medication.  Colchicine may reduce blood counts especially with prolonged use.  The patient understands that monitoring of kidney function and blood counts may be required, especially at baseline. The patient verbalized understanding of the proper use and possible adverse effects of colchicine.  All of the patient's questions and concerns were addressed. Cephalexin Counseling: I counseled the patient regarding use of cephalexin as an antibiotic for prophylactic and/or therapeutic purposes. Cephalexin (commonly prescribed under brand name Keflex) is a cephalosporin antibiotic which is active against numerous classes of bacteria, including most skin bacteria. Side effects may include nausea, diarrhea, gastrointestinal upset, rash, hives, yeast infections, and in rare cases, hepatitis, kidney disease, seizures, fever, confusion, neurologic symptoms, and others. Patients with severe allergies to penicillin medications are cautioned that there is about a 10% incidence of cross-reactivity with cephalosporins. When possible, patients with penicillin allergies should use alternatives to cephalosporins for antibiotic therapy. Cibinqo Counseling: I discussed with the patient the risks of Cibinqo therapy including but not limited to common cold, nausea, headache, cold sores, increased blood CPK levels, dizziness, UTIs, fatigue, acne, and vomitting. Live vaccines should be avoided.  This medication has been linked to serious infections; higher rate of mortality; malignancy and lymphoproliferative disorders; major adverse cardiovascular events; thrombosis; thrombocytopenia and lymphopenia; lipid elevations; and retinal detachment. Zyclara Counseling:  I discussed with the patient the risks of imiquimod including but not limited to erythema, scaling, itching, weeping, crusting, and pain.  Patient understands that the inflammatory response to imiquimod is variable from person to person and was educated regarded proper titration schedule.  If flu-like symptoms develop, patient knows to discontinue the medication and contact us. Humira Counseling:  I discussed with the patient the risks of adalimumab including but not limited to myelosuppression, immunosuppression, autoimmune hepatitis, demyelinating diseases, lymphoma, and serious infections.  The patient understands that monitoring is required including a PPD at baseline and must alert us or the primary physician if symptoms of infection or other concerning signs are noted. Albendazole Counseling:  I discussed with the patient the risks of albendazole including but not limited to cytopenia, kidney damage, nausea/vomiting and severe allergy.  The patient understands that this medication is being used in an off-label manner. Wartpeel Counseling:  I discussed with the patient the risks of Wartpeel including but not limited to erythema, scaling, itching, weeping, crusting, and pain. Include Pregnancy/Lactation Warning?: No Azelaic Acid Pregnancy And Lactation Text: This medication is considered safe during pregnancy and breast feeding. Topical Ketoconazole Counseling: Patient counseled that this medication may cause skin irritation or allergic reactions.  In the event of skin irritation, the patient was advised to reduce the amount of the drug applied or use it less frequently.   The patient verbalized understanding of the proper use and possible adverse effects of ketoconazole.  All of the patient's questions and concerns were addressed. Libtayo Counseling- I discussed with the patient the risks of Libtayo including but not limited to nausea, vomiting, diarrhea, and bone or muscle pain.  The patient verbalized understanding of the proper use and possible adverse effects of Libtayo.  All of the patient's questions and concerns were addressed. Nsaids Pregnancy And Lactation Text: These medications are considered safe up to 30 weeks gestation. It is excreted in breast milk. Cimzia Counseling:  I discussed with the patient the risks of Cimzia including but not limited to immunosuppression, allergic reactions and infections.  The patient understands that monitoring is required including a PPD at baseline and must alert us or the primary physician if symptoms of infection or other concerning signs are noted. Terbinafine Counseling: Patient counseling regarding adverse effects of terbinafine including but not limited to headache, diarrhea, rash, upset stomach, liver function test abnormalities, itching, taste/smell disturbance, nausea, abdominal pain, and flatulence.  There is a rare possibility of liver failure that can occur when taking terbinafine.  The patient understands that a baseline LFT and kidney function test may be required. The patient verbalized understanding of the proper use and possible adverse effects of terbinafine.  All of the patient's questions and concerns were addressed. Valtrex Counseling: I discussed with the patient the risks of valacyclovir including but not limited to kidney damage, nausea, vomiting and severe allergy.  The patient understands that if the infection seems to be worsening or is not improving, they are to call. Birth Control Pills Counseling: Birth Control Pill Counseling: I discussed with the patient the potential side effects of OCPs including but not limited to increased risk of stroke, heart attack, thrombophlebitis, deep venous thrombosis, hepatic adenomas, breast changes, GI upset, headaches, and depression.  The patient verbalized understanding of the proper use and possible adverse effects of OCPs. All of the patient's questions and concerns were addressed. Odomzo Counseling- I discussed with the patient the risks of Odomzo including but not limited to nausea, vomiting, diarrhea, constipation, weight loss, changes in the sense of taste, decreased appetite, muscle spasms, and hair loss.  The patient verbalized understanding of the proper use and possible adverse effects of Odomzo.  All of the patient's questions and concerns were addressed. Bexarotene Pregnancy And Lactation Text: This medication is Pregnancy Category X and should not be given to women who are pregnant or may become pregnant. This medication should not be used if you are breast feeding. 5-Fu Pregnancy And Lactation Text: This medication is Pregnancy Category X and contraindicated in pregnancy and in women who may become pregnant. It is unknown if this medication is excreted in breast milk. Klisyri Counseling:  I discussed with the patient the risks of Klisyri including but not limited to erythema, scaling, itching, weeping, crusting, and pain. Tremfya Counseling: I discussed with the patient the risks of guselkumab including but not limited to immunosuppression, serious infections, and drug reactions.  The patient understands that monitoring is required including a PPD at baseline and must alert us or the primary physician if symptoms of infection or other concerning signs are noted. Drysol Counseling:  I discussed with the patient the risks of drysol/aluminum chloride including but not limited to skin rash, itching, irritation, burning. Terbinafine Pregnancy And Lactation Text: This medication is Pregnancy Category B and is considered safe during pregnancy. It is also excreted in breast milk and breast feeding isn't recommended. Xeljanz Counseling: I discussed with the patient the risks of Xeljanz therapy including increased risk of infection, liver issues, headache, diarrhea, or cold symptoms. Live vaccines should be avoided. They were instructed to call if they have any problems. Propranolol Counseling:  I discussed with the patient the risks of propranolol including but not limited to low heart rate, low blood pressure, low blood sugar, restlessness and increased cold sensitivity. They should call the office if they experience any of these side effects. Minocycline Counseling: Patient advised regarding possible photosensitivity and discoloration of the teeth, skin, lips, tongue and gums.  Patient instructed to avoid sunlight, if possible.  When exposed to sunlight, patients should wear protective clothing, sunglasses, and sunscreen.  The patient was instructed to call the office immediately if the following severe adverse effects occur:  hearing changes, easy bruising/bleeding, severe headache, or vision changes.  The patient verbalized understanding of the proper use and possible adverse effects of minocycline.  All of the patient's questions and concerns were addressed. Glycopyrrolate Pregnancy And Lactation Text: This medication is Pregnancy Category B and is considered safe during pregnancy. It is unknown if it is excreted breast milk. Klisyri Pregnancy And Lactation Text: It is unknown if this medication can harm a developing fetus or if it is excreted in breast milk. Fluconazole Counseling:  Patient counseled regarding adverse effects of fluconazole including but not limited to headache, diarrhea, nausea, upset stomach, liver function test abnormalities, taste disturbance, and stomach pain.  There is a rare possibility of liver failure that can occur when taking fluconazole.  The patient understands that monitoring of LFTs and kidney function test may be required, especially at baseline. The patient verbalized understanding of the proper use and possible adverse effects of fluconazole.  All of the patient's questions and concerns were addressed. Simponi Counseling:  I discussed with the patient the risks of golimumab including but not limited to myelosuppression, immunosuppression, autoimmune hepatitis, demyelinating diseases, lymphoma, and serious infections.  The patient understands that monitoring is required including a PPD at baseline and must alert us or the primary physician if symptoms of infection or other concerning signs are noted. Cibinqo Pregnancy And Lactation Text: It is unknown if this medication will adversely affect pregnancy or breast feeding.  You should not take this medication if you are currently pregnant or planning a pregnancy or while breastfeeding. Cephalexin Pregnancy And Lactation Text: This medication is Pregnancy Category B and considered safe during pregnancy.  It is also excreted in breast milk but can be used safely for shorter doses. Cyclophosphamide Pregnancy And Lactation Text: This medication is Pregnancy Category D and it isn't considered safe during pregnancy. This medication is excreted in breast milk. Albendazole Pregnancy And Lactation Text: This medication is Pregnancy Category C and it isn't known if it is safe during pregnancy. It is also excreted in breast milk. Benzoyl Peroxide Counseling: Patient counseled that medicine may cause skin irritation and bleach clothing.  In the event of skin irritation, the patient was advised to reduce the amount of the drug applied or use it less frequently.   The patient verbalized understanding of the proper use and possible adverse effects of benzoyl peroxide.  All of the patient's questions and concerns were addressed. Litfulo Counseling: I discussed with the patient the risks of Litfulo therapy including but not limited to upper respiratory tract infections, shingles, cold sores, and nausea. Live vaccines should be avoided.  This medication has been linked to serious infections; higher rate of mortality; malignancy and lymphoproliferative disorders; major adverse cardiovascular events; thrombosis; gastrointestinal perforations; neutropenia; lymphopenia; anemia; liver enzyme elevations; and lipid elevations. Olanzapine Counseling- I discussed with the patient the common side effects of olanzapine including but are not limited to: lack of energy, dry mouth, increased appetite, sleepiness, tremor, constipation, dizziness, changes in behavior, or restlessness.  Explained that teenagers are more likely to experience headaches, abdominal pain, pain in the arms or legs, tiredness, and sleepiness.  Serious side effects include but are not limited: increased risk of death in elderly patients who are confused, have memory loss, or dementia-related psychosis; hyperglycemia; increased cholesterol and triglycerides; and weight gain. Cimzia Pregnancy And Lactation Text: This medication crosses the placenta but can be considered safe in certain situations. Cimzia may be excreted in breast milk. Birth Control Pills Pregnancy And Lactation Text: This medication should be avoided if pregnant and for the first 30 days post-partum. Valtrex Pregnancy And Lactation Text: this medication is Pregnancy Category B and is considered safe during pregnancy. This medication is not directly found in breast milk but it's metabolite acyclovir is present. Libtayo Pregnancy And Lactation Text: This medication is contraindicated in pregnancy and when breast feeding. Soolantra Counseling: I discussed with the patients the risks of topial Soolantra. This is a medicine which decreases the number of mites and inflammation in the skin. You experience burning, stinging, eye irritation or allergic reactions.  Please call our office if you develop any problems from using this medication. Protopic Counseling: Patient may experience a mild burning sensation during topical application. Protopic is not approved in children less than 2 years of age. There have been case reports of hematologic and skin malignancies in patients using topical calcineurin inhibitors although causality is questionable. Isotretinoin Counseling: Patient should get monthly blood tests, not donate blood, not drive at night if vision affected, not share medication, and not undergo elective surgery for 6 months after tx completed. Side effects reviewed, pt to contact office should one occur. Isotretinoin Pregnancy And Lactation Text: This medication is Pregnancy Category X and is considered extremely dangerous during pregnancy. It is unknown if it is excreted in breast milk. Hydroxychloroquine Counseling:  I discussed with the patient that a baseline ophthalmologic exam is needed at the start of therapy and every year thereafter while on therapy. A CBC may also be warranted for monitoring.  The side effects of this medication were discussed with the patient, including but not limited to agranulocytosis, aplastic anemia, seizures, rashes, retinopathy, and liver toxicity. Patient instructed to call the office should any adverse effect occur.  The patient verbalized understanding of the proper use and possible adverse effects of Plaquenil.  All the patient's questions and concerns were addressed. Dapsone Counseling: I discussed with the patient the risks of dapsone including but not limited to hemolytic anemia, agranulocytosis, rashes, methemoglobinemia, kidney failure, peripheral neuropathy, headaches, GI upset, and liver toxicity.  Patients who start dapsone require monitoring including baseline LFTs and weekly CBCs for the first month, then every month thereafter.  The patient verbalized understanding of the proper use and possible adverse effects of dapsone.  All of the patient's questions and concerns were addressed. Xolair Counseling:  Patient informed of potential adverse effects including but not limited to fever, muscle aches, rash and allergic reactions.  The patient verbalized understanding of the proper use and possible adverse effects of Xolair.  All of the patient's questions and concerns were addressed. Opioid Counseling: I discussed with the patient the potential side effects of opioids including but not limited to addiction, altered mental status, and depression. I stressed avoiding alcohol, benzodiazepines, muscle relaxants and sleep aids unless specifically okayed by a physician. The patient verbalized understanding of the proper use and possible adverse effects of opioids. All of the patient's questions and concerns were addressed. They were instructed to flush the remaining pills down the toilet if they did not need them for pain. Cosentyx Counseling:  I discussed with the patient the risks of Cosentyx including but not limited to worsening of Crohn's disease, immunosuppression, allergic reactions and infections.  The patient understands that monitoring is required including a PPD at baseline and must alert us or the primary physician if symptoms of infection or other concerning signs are noted. Spironolactone Counseling: Patient advised regarding risks of diarrhea, abdominal pain, hyperkalemia, birth defects (for female patients), liver toxicity and renal toxicity. The patient may need blood work to monitor liver and kidney function and potassium levels while on therapy. The patient verbalized understanding of the proper use and possible adverse effects of spironolactone.  All of the patient's questions and concerns were addressed. Propranolol Pregnancy And Lactation Text: This medication is Pregnancy Category C and it isn't known if it is safe during pregnancy. It is excreted in breast milk. Ilumya Counseling: I discussed with the patient the risks of tildrakizumab including but not limited to immunosuppression, malignancy, posterior leukoencephalopathy syndrome, and serious infections.  The patient understands that monitoring is required including a PPD at baseline and must alert us or the primary physician if symptoms of infection or other concerning signs are noted. Cyclosporine Counseling:  I discussed with the patient the risks of cyclosporine including but not limited to hypertension, gingival hyperplasia,myelosuppression, immunosuppression, liver damage, kidney damage, neurotoxicity, lymphoma, and serious infections. The patient understands that monitoring is required including baseline blood pressure, CBC, CMP, lipid panel and uric acid, and then 1-2 times monthly CMP and blood pressure. Clindamycin Counseling: I counseled the patient regarding use of clindamycin as an antibiotic for prophylactic and/or therapeutic purposes. Clindamycin is active against numerous classes of bacteria, including skin bacteria. Side effects may include nausea, diarrhea, gastrointestinal upset, rash, hives, yeast infections, and in rare cases, colitis. Cimetidine Counseling:  I discussed with the patient the risks of Cimetidine including but not limited to gynecomastia, headache, diarrhea, nausea, drowsiness, arrhythmias, pancreatitis, skin rashes, psychosis, bone marrow suppression and kidney toxicity. Topical Metronidazole Counseling: Metronidazole is a topical antibiotic medication. You may experience burning, stinging, redness, or allergic reactions.  Please call our office if you develop any problems from using this medication. Dutasteride Male Counseling: Dustasteride Counseling:  I discussed with the patient the risks of use of dutasteride including but not limited to decreased libido, decreased ejaculate volume, and gynecomastia. Women who can become pregnant should not handle medication.  All of the patient's questions and concerns were addressed. Griseofulvin Counseling:  I discussed with the patient the risks of griseofulvin including but not limited to photosensitivity, cytopenia, liver damage, nausea/vomiting and severe allergy.  The patient understands that this medication is best absorbed when taken with a fatty meal (e.g., ice cream or french fries). Benzoyl Peroxide Pregnancy And Lactation Text: This medication is Pregnancy Category C. It is unknown if benzoyl peroxide is excreted in breast milk. Clindamycin Pregnancy And Lactation Text: This medication can be used in pregnancy if certain situations. Clindamycin is also present in breast milk. Litfulo Pregnancy And Lactation Text: Based on animal studies, Lifulo may cause embryo-fetal harm when administered to pregnant women.  The medication should not be used in pregnancy.  Breastfeeding is not recommended during treatment. Soolantra Pregnancy And Lactation Text: This medication is Pregnancy Category C. This medication is considered safe during breast feeding. Ivermectin Counseling:  Patient instructed to take medication on an empty stomach with a full glass of water.  Patient informed of potential adverse effects including but not limited to nausea, diarrhea, dizziness, itching, and swelling of the extremities or lymph nodes.  The patient verbalized understanding of the proper use and possible adverse effects of ivermectin.  All of the patient's questions and concerns were addressed. Winlevi Counseling:  I discussed with the patient the risks of topical clascoterone including but not limited to erythema, scaling, itching, and stinging. Patient voiced their understanding. Minoxidil Counseling: Minoxidil is a topical medication which can increase blood flow where it is applied. It is uncertain how this medication increases hair growth. Side effects are uncommon and include stinging and allergic reactions. Olanzapine Pregnancy And Lactation Text: This medication is pregnancy category C.   There are no adequate and well controlled trials with olanzapine in pregnant females.  Olanzapine should be used during pregnancy only if the potential benefit justifies the potential risk to the fetus.   In a study in lactating healthy women, olanzapine was excreted in breast milk.  It is recommended that women taking olanzapine should not breast feed. Protopic Pregnancy And Lactation Text: This medication is Pregnancy Category C. It is unknown if this medication is excreted in breast milk when applied topically. High Dose Vitamin A Counseling: Side effects reviewed, pt to contact office should one occur. Hydroxychloroquine Pregnancy And Lactation Text: This medication has been shown to cause fetal harm but it isn't assigned a Pregnancy Risk Category. There are small amounts excreted in breast milk. Opioid Pregnancy And Lactation Text: These medications can lead to premature delivery and should be avoided during pregnancy. These medications are also present in breast milk in small amounts. Elidel Counseling: Patient may experience a mild burning sensation during topical application. Elidel is not approved in children less than 2 years of age. There have been case reports of hematologic and skin malignancies in patients using topical calcineurin inhibitors although causality is questionable. Winlevi Pregnancy And Lactation Text: This medication is considered safe during pregnancy and breastfeeding. Quinolones Counseling:  I discussed with the patient the risks of fluoroquinolones including but not limited to GI upset, allergic reaction, drug rash, diarrhea, dizziness, photosensitivity, yeast infections, liver function test abnormalities, tendonitis/tendon rupture. Topical Retinoid counseling:  Patient advised to apply a pea-sized amount only at bedtime and wait 30 minutes after washing their face before applying.  If too drying, patient may add a non-comedogenic moisturizer. The patient verbalized understanding of the proper use and possible adverse effects of retinoids.  All of the patient's questions and concerns were addressed. Spironolactone Pregnancy And Lactation Text: This medication can cause feminization of the male fetus and should be avoided during pregnancy. The active metabolite is also found in breast milk. SSKI Counseling:  I discussed with the patient the risks of SSKI including but not limited to thyroid abnormalities, metallic taste, GI upset, fever, headache, acne, arthralgias, paraesthesias, lymphadenopathy, easy bleeding, arrhythmias, and allergic reaction. Dapsone Pregnancy And Lactation Text: This medication is Pregnancy Category C and is not considered safe during pregnancy or breast feeding. Skyrizi Counseling: I discussed with the patient the risks of risankizumab-rzaa including but not limited to immunosuppression, and serious infections.  The patient understands that monitoring is required including a PPD at baseline and must alert us or the primary physician if symptoms of infection or other concerning signs are noted. Eucrisa Counseling: Patient may experience a mild burning sensation during topical application. Eucrisa is not approved in children less than 2 years of age. Rifampin Counseling: I discussed with the patient the risks of rifampin including but not limited to liver damage, kidney damage, red-orange body fluids, nausea/vomiting and severe allergy. Doxycycline Counseling:  Patient counseled regarding possible photosensitivity and increased risk for sunburn.  Patient instructed to avoid sunlight, if possible.  When exposed to sunlight, patients should wear protective clothing, sunglasses, and sunscreen.  The patient was instructed to call the office immediately if the following severe adverse effects occur:  hearing changes, easy bruising/bleeding, severe headache, or vision changes.  The patient verbalized understanding of the proper use and possible adverse effects of doxycycline.  All of the patient's questions and concerns were addressed. Griseofulvin Pregnancy And Lactation Text: This medication is Pregnancy Category X and is known to cause serious birth defects. It is unknown if this medication is excreted in breast milk but breast feeding should be avoided. Oral Minoxidil Counseling- I discussed with the patient the risks of oral minoxidil including but not limited to shortness of breath, swelling of the feet or ankles, dizziness, lightheadedness, unwanted hair growth and allergic reaction.  The patient verbalized understanding of the proper use and possible adverse effects of oral minoxidil.  All of the patient's questions and concerns were addressed. Rifampin Pregnancy And Lactation Text: This medication is Pregnancy Category C and it isn't know if it is safe during pregnancy. It is also excreted in breast milk and should not be used if you are breast feeding. Carac Counseling:  I discussed with the patient the risks of Carac including but not limited to erythema, scaling, itching, weeping, crusting, and pain. Qbrexza Counseling:  I discussed with the patient the risks of Qbrexza including but not limited to headache, mydriasis, blurred vision, dry eyes, nasal dryness, dry mouth, dry throat, dry skin, urinary hesitation, and constipation.  Local skin reactions including erythema, burning, stinging, and itching can also occur. Olumiant Counseling: I discussed with the patient the risks of Olumiant therapy including but not limited to upper respiratory tract infections, shingles, cold sores, and nausea. Live vaccines should be avoided.  This medication has been linked to serious infections; higher rate of mortality; malignancy and lymphoproliferative disorders; major adverse cardiovascular events; thrombosis; gastrointestinal perforations; neutropenia; lymphopenia; anemia; liver enzyme elevations; and lipid elevations. Xolair Pregnancy And Lactation Text: This medication is Pregnancy Category B and is considered safe during pregnancy. This medication is excreted in breast milk. Topical Metronidazole Pregnancy And Lactation Text: This medication is Pregnancy Category B and considered safe during pregnancy.  It is also considered safe to use while breastfeeding. Infliximab Counseling:  I discussed with the patient the risks of infliximab including but not limited to myelosuppression, immunosuppression, autoimmune hepatitis, demyelinating diseases, lymphoma, and serious infections.  The patient understands that monitoring is required including a PPD at baseline and must alert us or the primary physician if symptoms of infection or other concerning signs are noted. Mirvaso Counseling: Mirvaso is a topical medication which can decrease superficial blood flow where applied. Side effects are uncommon and include stinging, redness and allergic reactions. Xelsavannahz Pregnancy And Lactation Text: This medication is Pregnancy Category D and is not considered safe during pregnancy.  The risk during breast feeding is also uncertain. Dupixent Counseling: I discussed with the patient the risks of dupilumab including but not limited to eye infection and irritation, cold sores, injection site reactions, worsening of asthma, allergic reactions and increased risk of parasitic infection.  Live vaccines should be avoided while taking dupilumab. Dupilumab will also interact with certain medications such as warfarin and cyclosporine. The patient understands that monitoring is required and they must alert us or the primary physician if symptoms of infection or other concerning signs are noted. VTAMA Counseling: I discussed with the patient that VTAMA is not for use in the eyes, mouth or mouth. They should call the office if they develop any signs of allergic reactions to VTAMA. The patient verbalized understanding of the proper use and possible adverse effects of VTAMA.  All of the patient's questions and concerns were addressed. Gabapentin Counseling: I discussed with the patient the risks of gabapentin including but not limited to dizziness, somnolence, fatigue and ataxia. Sski Pregnancy And Lactation Text: This medication is Pregnancy Category D and isn't considered safe during pregnancy. It is excreted in breast milk. High Dose Vitamin A Pregnancy And Lactation Text: High dose vitamin A therapy is contraindicated during pregnancy and breast feeding. Dutasteride Female Counseling: Dutasteride Counseling:  I discussed with the patient the risks of use of dutasteride including but not limited to decreased libido and sexual dysfunction. Explained the teratogenic nature of the medication and stressed the importance of not getting pregnant during treatment. All of the patient's questions and concerns were addressed. Azithromycin Counseling:  I discussed with the patient the risks of azithromycin including but not limited to GI upset, allergic reaction, drug rash, diarrhea, and yeast infections. Methotrexate Counseling:  Patient counseled regarding adverse effects of methotrexate including but not limited to nausea, vomiting, abnormalities in liver function tests. Patients may develop mouth sores, rash, diarrhea, and abnormalities in blood counts. The patient understands that monitoring is required including LFT's and blood counts.  There is a rare possibility of scarring of the liver and lung problems that can occur when taking methotrexate. Persistent nausea, loss of appetite, pale stools, dark urine, cough, and shortness of breath should be reported immediately. Patient advised to discontinue methotrexate treatment at least three months before attempting to become pregnant.  I discussed the need for folate supplements while taking methotrexate.  These supplements can decrease side effects during methotrexate treatment. The patient verbalized understanding of the proper use and possible adverse effects of methotrexate.  All of the patient's questions and concerns were addressed. Low Dose Naltrexone Counseling- I discussed with the patient the potential risks and side effects of low dose naltrexone including but not limited to: more vivid dreams, headaches, nausea, vomiting, abdominal pain, fatigue, dizziness, and anxiety. Itraconazole Counseling:  I discussed with the patient the risks of itraconazole including but not limited to liver damage, nausea/vomiting, neuropathy, and severe allergy.  The patient understands that this medication is best absorbed when taken with acidic beverages such as non-diet cola or ginger ale.  The patient understands that monitoring is required including baseline LFTs and repeat LFTs at intervals.  The patient understands that they are to contact us or the primary physician if concerning signs are noted. Methotrexate Pregnancy And Lactation Text: This medication is Pregnancy Category X and is known to cause fetal harm. This medication is excreted in breast milk. Hydroquinone Counseling:  Patient advised that medication may result in skin irritation, lightening (hypopigmentation), dryness, and burning.  In the event of skin irritation, the patient was advised to reduce the amount of the drug applied or use it less frequently.  Rarely, spots that are treated with hydroquinone can become darker (pseudoochronosis).  Should this occur, patient instructed to stop medication and call the office. The patient verbalized understanding of the proper use and possible adverse effects of hydroquinone.  All of the patient's questions and concerns were addressed. Stelara Counseling:  I discussed with the patient the risks of ustekinumab including but not limited to immunosuppression, malignancy, posterior leukoencephalopathy syndrome, and serious infections.  The patient understands that monitoring is required including a PPD at baseline and must alert us or the primary physician if symptoms of infection or other concerning signs are noted. Erivedge Counseling- I discussed with the patient the risks of Erivedge including but not limited to nausea, vomiting, diarrhea, constipation, weight loss, changes in the sense of taste, decreased appetite, muscle spasms, and hair loss.  The patient verbalized understanding of the proper use and possible adverse effects of Erivedge.  All of the patient's questions and concerns were addressed. Sarecycline Counseling: Patient advised regarding possible photosensitivity and discoloration of the teeth, skin, lips, tongue and gums.  Patient instructed to avoid sunlight, if possible.  When exposed to sunlight, patients should wear protective clothing, sunglasses, and sunscreen.  The patient was instructed to call the office immediately if the following severe adverse effects occur:  hearing changes, easy bruising/bleeding, severe headache, or vision changes.  The patient verbalized understanding of the proper use and possible adverse effects of sarecycline.  All of the patient's questions and concerns were addressed. Qbrexza Pregnancy And Lactation Text: There is no available data on Qbrexza use in pregnant women.  There is no available data on Qbrexza use in lactation. Oral Minoxidil Pregnancy And Lactation Text: This medication should only be used when clearly needed if you are pregnant, attempting to become pregnant or breast feeding. Doxycycline Pregnancy And Lactation Text: This medication is Pregnancy Category D and not consider safe during pregnancy. It is also excreted in breast milk but is considered safe for shorter treatment courses. Olumiant Pregnancy And Lactation Text: Based on animal studies, Olumiant may cause embryo-fetal harm when administered to pregnant women.  The medication should not be used in pregnancy.  Breastfeeding is not recommended during treatment. Arava Counseling:  Patient counseled regarding adverse effects of Arava including but not limited to nausea, vomiting, abnormalities in liver function tests. Patients may develop mouth sores, rash, diarrhea, and abnormalities in blood counts. The patient understands that monitoring is required including LFTs and blood counts.  There is a rare possibility of scarring of the liver and lung problems that can occur when taking methotrexate. Persistent nausea, loss of appetite, pale stools, dark urine, cough, and shortness of breath should be reported immediately. Patient advised to discontinue Arava treatment and consult with a physician prior to attempting conception. The patient will have to undergo a treatment to eliminate Arava from the body prior to conception. Doxepin Counseling:  Patient advised that the medication is sedating and not to drive a car after taking this medication. Patient informed of potential adverse effects including but not limited to dry mouth, urinary retention, and blurry vision.  The patient verbalized understanding of the proper use and possible adverse effects of doxepin.  All of the patient's questions and concerns were addressed. Topical Steroids Counseling: I discussed with the patient that prolonged use of topical steroids can result in the increased appearance of superficial blood vessels (telangiectasias), lightening (hypopigmentation) and thinning of the skin (atrophy).  Patient understands to avoid using high potency steroids in skin folds, the groin or the face.  The patient verbalized understanding of the proper use and possible adverse effects of topical steroids.  All of the patient's questions and concerns were addressed. Azathioprine Counseling:  I discussed with the patient the risks of azathioprine including but not limited to myelosuppression, immunosuppression, hepatotoxicity, lymphoma, and infections.  The patient understands that monitoring is required including baseline LFTs, Creatinine, possible TPMP genotyping and weekly CBCs for the first month and then every 2 weeks thereafter.  The patient verbalized understanding of the proper use and possible adverse effects of azathioprine.  All of the patient's questions and concerns were addressed. Dupixent Pregnancy And Lactation Text: This medication likely crosses the placenta but the risk for the fetus is uncertain. This medication is excreted in breast milk. Topical Steroids Applications Pregnancy And Lactation Text: Most topical steroids are considered safe to use during pregnancy and lactation.  Any topical steroid applied to the breast or nipple should be washed off before breastfeeding. Rhofade Counseling: Rhofade is a topical medication which can decrease superficial blood flow where applied. Side effects are uncommon and include stinging, redness and allergic reactions. Adbry Counseling: I discussed with the patient the risks of tralokinumab including but not limited to eye infection and irritation, cold sores, injection site reactions, worsening of asthma, allergic reactions and increased risk of parasitic infection.  Live vaccines should be avoided while taking tralokinumab. The patient understands that monitoring is required and they must alert us or the primary physician if symptoms of infection or other concerning signs are noted. Dutasteride Pregnancy And Lactation Text: This medication is absolutely contraindicated in women, especially during pregnancy and breast feeding. Feminization of male fetuses is possible if taking while pregnant. Thalidomide Counseling: I discussed with the patient the risks of thalidomide including but not limited to birth defects, anxiety, weakness, chest pain, dizziness, cough and severe allergy. Tazorac Counseling:  Patient advised that medication is irritating and drying.  Patient may need to apply sparingly and wash off after an hour before eventually leaving it on overnight.  The patient verbalized understanding of the proper use and possible adverse effects of tazorac.  All of the patient's questions and concerns were addressed. Low Dose Naltrexone Pregnancy And Lactation Text: Naltrexone is pregnancy category C.  There have been no adequate and well-controlled studies in pregnant women.  It should be used in pregnancy only if the potential benefit justifies the potential risk to the fetus.   Limited data indicates that naltrexone is minimally excreted into breastmilk. Azithromycin Pregnancy And Lactation Text: This medication is considered safe during pregnancy and is also secreted in breast milk.

## 2024-04-12 ENCOUNTER — APPOINTMENT (OUTPATIENT)
Dept: GERIATRICS | Facility: CLINIC | Age: 89
End: 2024-04-12
Payer: MEDICARE

## 2024-04-12 VITALS
TEMPERATURE: 97.5 F | SYSTOLIC BLOOD PRESSURE: 110 MMHG | HEART RATE: 70 BPM | WEIGHT: 143 LBS | DIASTOLIC BLOOD PRESSURE: 77 MMHG | HEIGHT: 60 IN | OXYGEN SATURATION: 98 % | BODY MASS INDEX: 28.07 KG/M2

## 2024-04-12 DIAGNOSIS — G30.1 ALZHEIMER'S DISEASE WITH LATE ONSET: ICD-10-CM

## 2024-04-12 DIAGNOSIS — F32.A DEPRESSION, UNSPECIFIED: ICD-10-CM

## 2024-04-12 DIAGNOSIS — Z71.89 OTHER SPECIFIED COUNSELING: ICD-10-CM

## 2024-04-12 DIAGNOSIS — R26.81 UNSTEADINESS ON FEET: ICD-10-CM

## 2024-04-12 DIAGNOSIS — I48.0 PAROXYSMAL ATRIAL FIBRILLATION: ICD-10-CM

## 2024-04-12 DIAGNOSIS — I10 ESSENTIAL (PRIMARY) HYPERTENSION: ICD-10-CM

## 2024-04-12 DIAGNOSIS — F02.818 ALZHEIMER'S DISEASE WITH LATE ONSET: ICD-10-CM

## 2024-04-12 DIAGNOSIS — H61.20 IMPACTED CERUMEN, UNSPECIFIED EAR: ICD-10-CM

## 2024-04-12 PROCEDURE — 99214 OFFICE O/P EST MOD 30 MIN: CPT | Mod: GC,25

## 2024-04-12 PROCEDURE — 69210 REMOVE IMPACTED EAR WAX UNI: CPT

## 2024-04-12 PROCEDURE — G2211 COMPLEX E/M VISIT ADD ON: CPT

## 2024-04-12 NOTE — HISTORY OF PRESENT ILLNESS
[Completely Dependent] : Completely dependent. [Wheelchair] : wheelchair [I will adhere to the patient's wishes.] : I will adhere to the patient's wishes. [Any fall with injury in past year] : Patient reported fall with injury in the past year [Little interest or pleasure doing things] : 1) Little interest or pleasure doing things [0] : 1) Little interest or pleasure doing things: Not at all (0) [Feeling down, depressed, or hopeless] : 2) Feeling down, depressed, or hopeless [1] : 2) Feeling down, depressed, or hopeless for several days (1) [PHQ-2 Negative - No further assessment needed] : PHQ-2 Negative - No further assessment needed [I have developed a follow-up plan documented below in the note.] : I have developed a follow-up plan documented below in the note. [FreeTextEntry1] : 88 year old female with h/o of femur fracture on the right repaired on nov 15 2023. She went home after surgery. Today she is doing well. Mood has been stable for the most part as per dtr. She requires oe person assit for her activities. Had hearing issues using debrox for a while. Pt has been helping her. As per dtr she has been more on the wheelcahir lately.   Discussed and confirmed advance directives DNR/DNI.  [[id number]] : 1

## 2024-04-12 NOTE — REASON FOR VISIT
[Follow-Up] : a follow-up visit [FreeTextEntry1] : Follow-up dementia [FreeTextEntry2] : dtr and aide

## 2024-04-12 NOTE — PHYSICAL EXAM
[Alert] : alert [No Acute Distress] : in no acute distress [No Respiratory Distress] : no respiratory distress [Auscultation Breath Sounds / Voice Sounds] : lungs were clear to auscultation bilaterally [Heart Rate And Rhythm] : heart rate was normal and rhythm regular [Abdomen Tenderness] : non-tender [Abdomen Soft] : soft [No Focal Deficits] : no focal deficits [Normal Affect] : the affect was normal [Normal Mood] : the mood was normal [de-identified] : on wheelchair [de-identified] : wax in both ears.  Removed with curette

## 2024-04-12 NOTE — REVIEW OF SYSTEMS
[Loss Of Hearing] : hearing loss [Fever] : no fever [Chills] : no chills [Chest Pain] : no chest pain [Dizziness] : no dizziness

## 2024-05-24 NOTE — CONSULT NOTE ADULT - SUBJECTIVE AND OBJECTIVE BOX
Low vitamin D . Start weekly vitamin D supplement sent to your pharmacy.    89 y/o female w/ PMH dementia, depression, anxiety, HTN, HLD w/ PSH knee/hip replacement c/o fall this AM. Pt was walking out of the restroom, felt her leg give out, and sat down. Pt twisted her right leg standing up, causing pain. Pt was diagnosed w/ a right femur fracture by PCP XRs, prompting ED arrival. Pt is otherwise asymptomatic.  Pt admits to lightheadedness and nausea, otherwise asymptomatic. Denies fevers, chills, vomiting, chest pain, SOB, abdominal pain, dysuria, hematuria. Pt seen now resting comfortably      PAST MEDICAL & SURGICAL HISTORY:  HTN    Dementia    depression and anxiety    diverticulitis    right wrist fracture          MEDICATIONS  (STANDING):  acetaminophen     Tablet .. 975 milliGRAM(s) Oral every 8 hours  QUEtiapine 100 milliGRAM(s) Oral once  senna 2 Tablet(s) Oral at bedtime    MEDICATIONS  (PRN):  magnesium hydroxide Suspension 30 milliLiter(s) Oral daily PRN Constipation  oxyCODONE    IR 5 milliGRAM(s) Oral every 4 hours PRN Severe Pain (7 - 10)  oxyCODONE    IR 2.5 milliGRAM(s) Oral every 4 hours PRN Moderate Pain (4 - 6)    Social Hx:  Tobacco: neg  ETOH: Neg  Drugs: Neg    Family Hx:  As per my conversation with the patients daughter, non contributory       ROS  CONSTITUTIONAL: No weakness, fevers or chills  EYES/ENT: No visual changes;  No vertigo or throat pain   NECK: No pain or stiffness  RESPIRATORY: No cough, wheezing, hemoptysis; No shortness of breath  CARDIOVASCULAR: No chest pain or palpitations  GASTROINTESTINAL: No abdominal or epigastric pain. No nausea, vomiting, or hematemesis; No diarrhea or constipation. No melena or hematochezia.  GENITOURINARY: No dysuria, frequency or hematuria  NEUROLOGICAL: No numbness or weakness  SKIN: No itching, burning, rashes, or lesions   MUSCULOSKELETAL: right leg pain    INTERVAL HPI/OVERNIGHT EVENTS:  T(C): 36.4 (11-14-23 @ 17:00), Max: 36.8 (11-14-23 @ 11:03)  HR: 80 (11-14-23 @ 17:00) (78 - 90)  BP: 99/65 (11-14-23 @ 17:00) (99/65 - 112/78)  RR: 16 (11-14-23 @ 17:00) (14 - 16)  SpO2: 95% (11-14-23 @ 17:00) (95% - 99%)  Wt(kg): --  I&O's Summary      PHYSICAL EXAM:  GENERAL: NAD, well-groomed, well-developed  HEAD:  Atraumatic, Normocephalic  EYES: EOMI, PERRLA, conjunctiva and sclera clear  ENMT: No tonsillar erythema, exudates, or enlargement; Moist mucous membranes, Good dentition, No lesions  NECK: Supple, No JVD, Normal thyroid  NERVOUS SYSTEM:  Alert & Oriented X1,  CHEST/LUNG: Clear to percussion bilaterally; No rales, rhonchi, wheezing, or rubs  HEART: Regular rate and rhythm; No murmurs, rubs, or gallops  ABDOMEN: Soft, Nontender, Nondistended; Bowel sounds present  EXTREMITIES:  2+ Peripheral Pulses, No clubbing, cyanosis, or edema  LYMPH: No lymphadenopathy noted  SKIN: No rashes or lesions        LABS:                        12.0   11.50 )-----------( 207      ( 14 Nov 2023 14:42 )             36.0     11-14    141  |  102  |  15  ----------------------------<  167<H>  4.0   |  21<L>  |  0.87    Ca    9.5      14 Nov 2023 14:42    TPro  6.7  /  Alb  3.6  /  TBili  0.7  /  DBili  x   /  AST  25  /  ALT  13  /  AlkPhos  68  11-14    PT/INR - ( 14 Nov 2023 14:42 )   PT: 12.2 sec;   INR: 1.17 ratio         PTT - ( 14 Nov 2023 14:42 )  PTT:27.4 sec  Urinalysis Basic - ( 14 Nov 2023 14:42 )    Color: x / Appearance: x / SG: x / pH: x  Gluc: 167 mg/dL / Ketone: x  / Bili: x / Urobili: x   Blood: x / Protein: x / Nitrite: x   Leuk Esterase: x / RBC: x / WBC x   Sq Epi: x / Non Sq Epi: x / Bacteria: x      CAPILLARY BLOOD GLUCOSE            Urinalysis Basic - ( 14 Nov 2023 14:42 )    Color: x / Appearance: x / SG: x / pH: x  Gluc: 167 mg/dL / Ketone: x  / Bili: x / Urobili: x   Blood: x / Protein: x / Nitrite: x   Leuk Esterase: x / RBC: x / WBC x   Sq Epi: x / Non Sq Epi: x / Bacteria: x    EKG pending    89 y/o female w/ PMH dementia, depression, anxiety, HTN, HLD w/ PSH knee/hip replacement c/o fall this AM. Pt was walking out of the restroom, felt her leg give out, and sat down. Pt twisted her right leg standing up, causing pain. Pt was diagnosed w/ a right femur fracture by PCP XRs, prompting ED arrival. Pt is otherwise asymptomatic.  Pt admits to lightheadedness and nausea, otherwise asymptomatic. Denies fevers, chills, vomiting, chest pain, SOB, abdominal pain, dysuria, hematuria. Pt seen now resting comfortably      PAST MEDICAL & SURGICAL HISTORY:  HTN    Dementia    depression and anxiety    diverticulitis    right wrist fracture          MEDICATIONS  (STANDING):  acetaminophen     Tablet .. 975 milliGRAM(s) Oral every 8 hours  QUEtiapine 100 milliGRAM(s) Oral once  senna 2 Tablet(s) Oral at bedtime    MEDICATIONS  (PRN):  magnesium hydroxide Suspension 30 milliLiter(s) Oral daily PRN Constipation  oxyCODONE    IR 5 milliGRAM(s) Oral every 4 hours PRN Severe Pain (7 - 10)  oxyCODONE    IR 2.5 milliGRAM(s) Oral every 4 hours PRN Moderate Pain (4 - 6)    Social Hx:  Tobacco: neg  ETOH: Neg  Drugs: Neg    Family Hx:  As per my conversation with the patients daughter, non contributory       ROS  CONSTITUTIONAL: No weakness, fevers or chills  EYES/ENT: No visual changes;  No vertigo or throat pain   NECK: No pain or stiffness  RESPIRATORY: No cough, wheezing, hemoptysis; No shortness of breath  CARDIOVASCULAR: No chest pain or palpitations  GASTROINTESTINAL: No abdominal or epigastric pain. No nausea, vomiting, or hematemesis; No diarrhea or constipation. No melena or hematochezia.  GENITOURINARY: No dysuria, frequency or hematuria  NEUROLOGICAL: No numbness or weakness  SKIN: No itching, burning, rashes, or lesions   MUSCULOSKELETAL: right leg pain    INTERVAL HPI/OVERNIGHT EVENTS:  T(C): 36.4 (11-14-23 @ 17:00), Max: 36.8 (11-14-23 @ 11:03)  HR: 80 (11-14-23 @ 17:00) (78 - 90)  BP: 99/65 (11-14-23 @ 17:00) (99/65 - 112/78)  RR: 16 (11-14-23 @ 17:00) (14 - 16)  SpO2: 95% (11-14-23 @ 17:00) (95% - 99%)  Wt(kg): --  I&O's Summary      PHYSICAL EXAM:  GENERAL: NAD, well-groomed, well-developed  HEAD:  Atraumatic, Normocephalic  EYES: EOMI, PERRLA, conjunctiva and sclera clear  ENMT: No tonsillar erythema, exudates, or enlargement; Moist mucous membranes, Good dentition, No lesions  NECK: Supple, No JVD, Normal thyroid  NERVOUS SYSTEM:  Alert & Oriented X1,  CHEST/LUNG: Clear to percussion bilaterally; No rales, rhonchi, wheezing, or rubs  HEART: Regular rate and rhythm; No murmurs, rubs, or gallops  ABDOMEN: Soft, Nontender, Nondistended; Bowel sounds present  EXTREMITIES:  2+ Peripheral Pulses, No clubbing, cyanosis, or edema  LYMPH: No lymphadenopathy noted  SKIN: No rashes or lesions        LABS:                        12.0   11.50 )-----------( 207      ( 14 Nov 2023 14:42 )             36.0     11-14    141  |  102  |  15  ----------------------------<  167<H>  4.0   |  21<L>  |  0.87    Ca    9.5      14 Nov 2023 14:42    TPro  6.7  /  Alb  3.6  /  TBili  0.7  /  DBili  x   /  AST  25  /  ALT  13  /  AlkPhos  68  11-14    PT/INR - ( 14 Nov 2023 14:42 )   PT: 12.2 sec;   INR: 1.17 ratio         PTT - ( 14 Nov 2023 14:42 )  PTT:27.4 sec  Urinalysis Basic - ( 14 Nov 2023 14:42 )    Color: x / Appearance: x / SG: x / pH: x  Gluc: 167 mg/dL / Ketone: x  / Bili: x / Urobili: x   Blood: x / Protein: x / Nitrite: x   Leuk Esterase: x / RBC: x / WBC x   Sq Epi: x / Non Sq Epi: x / Bacteria: x      CAPILLARY BLOOD GLUCOSE            Urinalysis Basic - ( 14 Nov 2023 14:42 )    Color: x / Appearance: x / SG: x / pH: x  Gluc: 167 mg/dL / Ketone: x  / Bili: x / Urobili: x   Blood: x / Protein: x / Nitrite: x   Leuk Esterase: x / RBC: x / WBC x   Sq Epi: x / Non Sq Epi: x / Bacteria: x    EKG: NSR @ 77 BPM

## 2024-08-16 ENCOUNTER — NON-APPOINTMENT (OUTPATIENT)
Age: 89
End: 2024-08-16

## 2025-05-14 ENCOUNTER — APPOINTMENT (OUTPATIENT)
Dept: GERIATRICS | Facility: CLINIC | Age: 89
End: 2025-05-14
Payer: MEDICARE

## 2025-05-14 VITALS
DIASTOLIC BLOOD PRESSURE: 93 MMHG | RESPIRATION RATE: 14 BRPM | HEIGHT: 60 IN | SYSTOLIC BLOOD PRESSURE: 125 MMHG | TEMPERATURE: 98.1 F

## 2025-05-14 VITALS — HEART RATE: 78 BPM | OXYGEN SATURATION: 98 %

## 2025-05-14 DIAGNOSIS — H61.20 IMPACTED CERUMEN, UNSPECIFIED EAR: ICD-10-CM

## 2025-05-14 DIAGNOSIS — R41.3 OTHER AMNESIA: ICD-10-CM

## 2025-05-14 DIAGNOSIS — I48.0 PAROXYSMAL ATRIAL FIBRILLATION: ICD-10-CM

## 2025-05-14 DIAGNOSIS — G30.1 ALZHEIMER'S DISEASE WITH LATE ONSET: ICD-10-CM

## 2025-05-14 DIAGNOSIS — Z79.01 LONG TERM (CURRENT) USE OF ANTICOAGULANTS: ICD-10-CM

## 2025-05-14 DIAGNOSIS — F02.818 ALZHEIMER'S DISEASE WITH LATE ONSET: ICD-10-CM

## 2025-05-14 DIAGNOSIS — E78.5 HYPERLIPIDEMIA, UNSPECIFIED: ICD-10-CM

## 2025-05-14 DIAGNOSIS — M81.0 AGE-RELATED OSTEOPOROSIS W/OUT CURRENT PATHOLOGICAL FRACTURE: ICD-10-CM

## 2025-05-14 DIAGNOSIS — I10 ESSENTIAL (PRIMARY) HYPERTENSION: ICD-10-CM

## 2025-05-14 DIAGNOSIS — H90.3 SENSORINEURAL HEARING LOSS, BILATERAL: ICD-10-CM

## 2025-05-14 PROCEDURE — G2211 COMPLEX E/M VISIT ADD ON: CPT

## 2025-05-14 PROCEDURE — 99214 OFFICE O/P EST MOD 30 MIN: CPT

## 2025-05-14 RX ORDER — ASPIRIN 81 MG
6.5 TABLET, DELAYED RELEASE (ENTERIC COATED) ORAL
Qty: 1 | Refills: 0 | Status: ACTIVE | COMMUNITY
Start: 2025-05-14

## 2025-05-14 RX ORDER — OLANZAPINE 2.5 MG/1
2.5 TABLET, FILM COATED ORAL DAILY
Qty: 30 | Refills: 0 | Status: ACTIVE | COMMUNITY
Start: 2025-05-14

## 2025-05-14 RX ORDER — FAMOTIDINE 20 MG/1
20 TABLET, FILM COATED ORAL
Qty: 90 | Refills: 3 | Status: ACTIVE | COMMUNITY
Start: 2025-05-14

## 2025-05-15 ENCOUNTER — LABORATORY RESULT (OUTPATIENT)
Age: 89
End: 2025-05-15

## 2025-05-16 ENCOUNTER — NON-APPOINTMENT (OUTPATIENT)
Age: 89
End: 2025-05-16

## 2025-05-20 ENCOUNTER — NON-APPOINTMENT (OUTPATIENT)
Age: 89
End: 2025-05-20

## 2025-05-23 LAB
ALBUMIN SERPL ELPH-MCNC: 3.7 G/DL
ALP BLD-CCNC: 79 U/L
ALT SERPL-CCNC: 7 U/L
ANION GAP SERPL CALC-SCNC: 17 MMOL/L
AST SERPL-CCNC: 16 U/L
BASOPHILS # BLD AUTO: 0.07 K/UL
BASOPHILS NFR BLD AUTO: 1.3 %
BILIRUB SERPL-MCNC: 0.3 MG/DL
BUN SERPL-MCNC: 13 MG/DL
CALCIUM SERPL-MCNC: 9.7 MG/DL
CHLORIDE SERPL-SCNC: 104 MMOL/L
CHOLEST SERPL-MCNC: 140 MG/DL
CO2 SERPL-SCNC: 21 MMOL/L
CREAT SERPL-MCNC: 1.32 MG/DL
EGFRCR SERPLBLD CKD-EPI 2021: 39 ML/MIN/1.73M2
EOSINOPHIL # BLD AUTO: 0.13 K/UL
EOSINOPHIL NFR BLD AUTO: 2.4 %
FERRITIN SERPL-MCNC: 28 NG/ML
FOLATE SERPL-MCNC: >20 NG/ML
GLUCOSE SERPL-MCNC: 120 MG/DL
HCT VFR BLD CALC: 34.1 %
HDLC SERPL-MCNC: 52 MG/DL
HGB BLD-MCNC: 10.4 G/DL
IMM GRANULOCYTES NFR BLD AUTO: 0.2 %
IRON SATN MFR SERPL: 13 %
IRON SERPL-MCNC: 34 UG/DL
LDLC SERPL-MCNC: 71 MG/DL
LYMPHOCYTES # BLD AUTO: 1.86 K/UL
LYMPHOCYTES NFR BLD AUTO: 35 %
MAN DIFF?: NORMAL
MCHC RBC-ENTMCNC: 28.7 PG
MCHC RBC-ENTMCNC: 30.5 G/DL
MCV RBC AUTO: 93.9 FL
MONOCYTES # BLD AUTO: 0.36 K/UL
MONOCYTES NFR BLD AUTO: 6.8 %
NEUTROPHILS # BLD AUTO: 2.88 K/UL
NEUTROPHILS NFR BLD AUTO: 54.3 %
NONHDLC SERPL-MCNC: 87 MG/DL
PLATELET # BLD AUTO: 280 K/UL
POTASSIUM SERPL-SCNC: 4.2 MMOL/L
PROT SERPL-MCNC: 6.7 G/DL
RBC # BLD: 3.63 M/UL
RBC # FLD: 17.2 %
SODIUM SERPL-SCNC: 143 MMOL/L
TIBC SERPL-MCNC: 267 UG/DL
TRIGL SERPL-MCNC: 87 MG/DL
TSH SERPL-ACNC: 4.44 UIU/ML
UIBC SERPL-MCNC: 233 UG/DL
VIT B12 SERPL-MCNC: 1820 PG/ML
WBC # FLD AUTO: 5.31 K/UL

## 2025-05-23 RX ORDER — FERROUS SULFATE 325(65) MG
325 (65 FE) TABLET ORAL
Qty: 100 | Refills: 1 | Status: ACTIVE | COMMUNITY
Start: 2025-05-23

## 2025-06-04 ENCOUNTER — NON-APPOINTMENT (OUTPATIENT)
Age: 89
End: 2025-06-04

## 2025-08-23 ENCOUNTER — NON-APPOINTMENT (OUTPATIENT)
Age: 89
End: 2025-08-23

## (undated) DEVICE — SUT POLYSORB 3-0 30" P-12 UNDYED

## (undated) DEVICE — VENODYNE/SCD SLEEVE CALF LARGE

## (undated) DEVICE — GLV 8.5 PROTEXIS (WHITE)

## (undated) DEVICE — DRAPE C ARM UNIVERSAL

## (undated) DEVICE — SUT POLYSORB 2-0 30" GS-21 UNDYED

## (undated) DEVICE — SUT POLYSORB 0 36" GS-24 UNDYED

## (undated) DEVICE — DRSG AQUACEL 3.5 X 14"

## (undated) DEVICE — GLV 7.5 PROTEXIS (WHITE)

## (undated) DEVICE — DRSG STOCKINETTE TUBULAR COTTON 1PLY 6X72"

## (undated) DEVICE — SOL IRR POUR H2O 250ML

## (undated) DEVICE — DRAPE MAYO STAND 30"

## (undated) DEVICE — DRSG KLING 4"

## (undated) DEVICE — BLADE SCALPEL SAFETYLOCK #15

## (undated) DEVICE — GLV 7 PROTEXIS (WHITE)

## (undated) DEVICE — DRSG WEBRIL 6"

## (undated) DEVICE — SYR ASEPTO

## (undated) DEVICE — TOURNIQUET CUFF 42" DUAL PORT W PLC

## (undated) DEVICE — ELCTR BOVIE PENCIL SMOKE EVACUATION

## (undated) DEVICE — DRILL BIT STRYKER CALIBRATED 3.2MM

## (undated) DEVICE — DRAPE 3/4 SHEET W REINFORCEMENT 56X77"

## (undated) DEVICE — TAPE SILK 3"

## (undated) DEVICE — GLV 6.5 PROTEXIS (WHITE)

## (undated) DEVICE — SOL IRR POUR NS 0.9% 500ML

## (undated) DEVICE — PACK EXTREMITY

## (undated) DEVICE — DRILL BIT STRYKER ORTHO 4.3X310MM

## (undated) DEVICE — DRAPE TOWEL BLUE 17" X 24"

## (undated) DEVICE — DRAPE C ARM C-ARMOUR

## (undated) DEVICE — DRSG KLING 6"

## (undated) DEVICE — DRSG AQUACEL 3.5 X 12"

## (undated) DEVICE — GOWN TRIMAX LG

## (undated) DEVICE — STAPLER SKIN VISI-STAT 35 WIDE

## (undated) DEVICE — WOUND IRR SURGIPHOR

## (undated) DEVICE — DRAPE SPLIT SHEET 77" X 108"

## (undated) DEVICE — WARMING BLANKET UPPER ADULT

## (undated) DEVICE — TOURNIQUET CUFF 34" DUAL PORT W PLC

## (undated) DEVICE — GLV 8 PROTEXIS (WHITE)